# Patient Record
Sex: FEMALE | Race: WHITE | NOT HISPANIC OR LATINO | Employment: FULL TIME | URBAN - METROPOLITAN AREA
[De-identification: names, ages, dates, MRNs, and addresses within clinical notes are randomized per-mention and may not be internally consistent; named-entity substitution may affect disease eponyms.]

---

## 2017-03-28 RX ORDER — LEVOTHYROXINE SODIUM 112 MCG
TABLET ORAL
Qty: 90 TAB | Refills: 0 | Status: SHIPPED | OUTPATIENT
Start: 2017-03-28 | End: 2017-06-01 | Stop reason: SDUPTHER

## 2017-04-20 ENCOUNTER — OFFICE VISIT (OUTPATIENT)
Dept: CARDIOLOGY | Facility: MEDICAL CENTER | Age: 57
End: 2017-04-20
Payer: COMMERCIAL

## 2017-04-20 VITALS
HEIGHT: 68 IN | HEART RATE: 86 BPM | OXYGEN SATURATION: 95 % | BODY MASS INDEX: 40.92 KG/M2 | DIASTOLIC BLOOD PRESSURE: 88 MMHG | WEIGHT: 270 LBS | SYSTOLIC BLOOD PRESSURE: 148 MMHG

## 2017-04-20 DIAGNOSIS — I10 ESSENTIAL HYPERTENSION: ICD-10-CM

## 2017-04-20 DIAGNOSIS — E66.01 MORBID OBESITY DUE TO EXCESS CALORIES (HCC): ICD-10-CM

## 2017-04-20 PROCEDURE — 99204 OFFICE O/P NEW MOD 45 MIN: CPT | Performed by: INTERNAL MEDICINE

## 2017-04-20 PROCEDURE — 93000 ELECTROCARDIOGRAM COMPLETE: CPT | Performed by: INTERNAL MEDICINE

## 2017-04-20 RX ORDER — VALSARTAN 160 MG/1
TABLET ORAL
Refills: 0 | COMMUNITY
Start: 2017-04-14 | End: 2017-06-27 | Stop reason: SDUPTHER

## 2017-04-20 RX ORDER — MONTELUKAST SODIUM 4 MG/1
TABLET, CHEWABLE ORAL
Refills: 0 | COMMUNITY
Start: 2017-03-28 | End: 2018-09-26

## 2017-04-20 RX ORDER — AMLODIPINE BESYLATE 10 MG/1
10 TABLET ORAL DAILY
COMMUNITY
End: 2017-06-01 | Stop reason: SDUPTHER

## 2017-04-20 RX ORDER — OMEGA-3-ACID ETHYL ESTERS 1 G/1
CAPSULE, LIQUID FILLED ORAL
COMMUNITY
End: 2017-05-12

## 2017-04-20 ASSESSMENT — ENCOUNTER SYMPTOMS
NERVOUS/ANXIOUS: 0
ORTHOPNEA: 0
COUGH: 0
INSOMNIA: 1
PALPITATIONS: 0
DIZZINESS: 0
BRUISES/BLEEDS EASILY: 0
MUSCULOSKELETAL NEGATIVE: 1
EYES NEGATIVE: 1
DEPRESSION: 0
FALLS: 0
WHEEZING: 0
LOSS OF CONSCIOUSNESS: 0
SHORTNESS OF BREATH: 1
WEIGHT LOSS: 0
MEMORY LOSS: 0
NAUSEA: 0
HEARTBURN: 0

## 2017-04-20 NOTE — MR AVS SNAPSHOT
"        Nazia Lyle   2017 9:45 AM   Office Visit   MRN: [id number]    Department:  [facility]   Dept Phone:  [phone]    Description:  Female : 1960   Provider:  Teena Roberto M.D.           Reason for Visit     New Patient           Allergies as of 2017     Allergen Noted Reactions    Latex 2017   Rash    rash    Nkda [No Known Drug Allergy] 2012         You were diagnosed with     Essential hypertension   [[id number]]         Vital Signs     Blood Pressure Pulse Height Weight Body Mass Index Oxygen Saturation    148/88 mmHg 86 1.727 m (5' 7.99\") 122.471 kg (270 lb) 41.06 kg/m2 95%    Smoking Status                   Never Smoker            Basic Information     Date Of Birth Sex Race Ethnicity Preferred Language    1960 Female White Non- English      Your appointments     May 12, 2017  8:40 AM   Established Patient with Benita Nolasco M.D.   [facility]    [address], [address]  [place] [address] [phone]           You will be receiving a confirmation call a few days before your appointment from our automated call confirmation system.            May 17, 2017  7:15 AM   ECHO with ECHO [facility] EXAM 10   ECHOCARDIOLOGY [facility] ([facility])    [address] [address] [phone]           No prep              Problem List              ICD-10-CM Priority Class Noted - Resolved    HTN (hypertension) I10   2012 - Present    Dyslipidemia E78.5   2012 - Present    Obesity E66.9   2012 - Present    Degenerative arthritis of finger M19.049   2014 - Present    Hypothyroidism E03.9   2014 - Present    Elevated fasting blood sugar R73.01   2014 - Present    Vitamin D deficiency E55.9   2014 - Present    Hot flash, menopausal N95.1   2014 - Present    Morbid obesity (CMS-HCC) E66.01   2014 - Present    Vitamin D insufficiency E55.9   2014 - Present      "   Health Maintenance        Date Due Completion Dates    IMM DTaP/Tdap/Td Vaccine (1 - Tdap) 2/1/1979 ---    PAP SMEAR 8/28/2015 8/28/2012 (Prv Comp)    Override on 8/28/2012: Previously completed    MAMMOGRAM 10/9/2016 10/9/2015, 9/25/2014, 9/13/2013, 4/11/2011, 3/3/2010, 3/3/2010, 5/14/2009, 5/14/2009, 4/17/2008, 4/17/2008, 3/21/2007, 7/19/2005, 2/6/2004    COLONOSCOPY 6/4/2022 6/4/2012            Results       Current Immunizations     No immunizations on file.      Below and/or attached are the medications your provider expects you to take. Review all of your home medications and newly ordered medications with your provider and/or pharmacist. Follow medication instructions as directed by your provider and/or pharmacist. Please keep your medication list with you and share with your provider. Update the information when medications are discontinued, doses are changed, or new medications (including over-the-counter products) are added; and carry medication information at all times in the event of emergency situations     Allergies:  LATEX - Rash     NKDA - (reactions not documented)               Medications  Valid as of: April 20, 2017 - 10:39 AM    Generic Name Brand Name Tablet Size Instructions for use    AmLODIPine Besylate (Tab) NORVASC 10 MG Take 10 mg by mouth every day.        Colestipol HCl (Tab) COLESTID 1 GM         Furosemide (Tab) LASIX 20 MG Take 20 mg by mouth every day.        Hydrocodone-Acetaminophen (Tab) VICODIN 5-500 MG Take 1-2 Tabs by mouth every four hours as needed.        Ibuprofen (Tab) MOTRIN 200 MG Take 600 mg by mouth every 6 hours as needed.        Levothyroxine Sodium (Tab) SYNTHROID 112 MCG TAKE 1 TABLET ORALLY EVERY MORNING ON AN EMPTYSTOMACH        Liothyronine Sodium (Tab) CYTOMEL 5 MCG Take 1 Tab by mouth every day.        Omega-3-acid Ethyl Esters (Cap) LOVAZA 1 GM Take 2 Caps by mouth 2 Times a Day.        Omega-3-acid Ethyl Esters (Cap) LOVAZA 1 GM Take  by mouth.         Pravastatin Sodium (Tab) PRAVACHOL 20 MG TAKE 1 TABLET BY MOUTH ATBEDTIME        Probiotic Product   Take  by mouth.        Valsartan (Tab) DIOVAN 160 MG         Valsartan-Hydrochlorothiazide (Tab) DIOVAN--12.5 MG Take 1 Tab by mouth every day.        .                 Medicines prescribed today were sent to:     [facility] [facility], [place] - [address]    [address] [place] [address]    Phone: [phone] Fax: [phone]    Open 24 Hours?: No    [id number] - [place], [place] - [address]    [address] [place] [address]    Phone: [phone] Fax: [phone]    Open 24 Hours?: No    [facility] [place] [id number] - [place], [place] - [address]    [address] [facility] (not [facility]ty) [facility] [address]    Phone: [phone] Fax: [phone]    Open 24 Hours?: No      Medication refill instructions:       If your prescription bottle indicates you have medication refills left, it is not necessary to call your provider’s office. Please contact your pharmacy and they will refill your medication.    If your prescription bottle indicates you do not have any refills left, you may request refills at any time through one of the following ways: The online GoSave system (except Urgent Care), by calling your provider’s office, or by asking your pharmacy to contact your provider’s office with a refill request. Medication refills are processed only during regular business hours and may not be available until the next business day. Your provider may request additional information or to have a follow-up visit with you prior to refilling your medication.   *Please Note: Medication refills are assigned a new Rx number when refilled electronically. Your pharmacy may indicate that no refills were authorized even though a new prescription for the same medication is available at the pharmacy. Please request the medicine by name with the pharmacy before contacting  your provider for a refill.        Your To Do List     Future Labs/Procedures Complete By Expires    Echocardiogram Comp w/o Cont  As directed 4/20/2018         MyChart Access Code: Activation code not generated  Current Strike[employer] Status: Active

## 2017-04-20 NOTE — PROGRESS NOTES
Subjective:   Nazia Lyle is a 57 y.o. female who presents today as a new patient. She was sent in consultation by Dr. Zamarripa in regards to her hypertension.    She is in with 2 of her friends. They live in [place].    She works full time. She considers herself sedentary. She is so busy at work she cannot find time to exercise. She said it's not an issue of eating too much.  She has been diagnosed with hypertension for many years. In fact when she was 26 years old she weighed 120 pounds. She was told she had hypertension at that point.     Everyone in her family dies young, she tells me  She is concerned about her medications. She thinks she is overmedicated. She was on a beta blocker. She thinks this caused weight gain. She is not a calcium channel blocker and wonders if it causes her profound fatigue. She is tired when she wakes up. She drags her most of the day. She loves cooking and is a wonderful cook    She takes her medications as directed. She is very good at recording her blood pressures on her phone. Most of her readings systolics are in the 140s and her diastolics in the 90s to 100s.  She is under a lot of stress but denies abuse or emotional issues    No chest comfort but she does admit to snoring and often has shortness of breath in addition to her concern with her edema    Past Medical History   Diagnosis Date   • HTN (hypertension) 9/17/2012   • Dyslipidemia 9/17/2012   • Obesity 9/17/2012   • History of kidney stones      Past Surgical History   Procedure Laterality Date   • Tonsillectomy     • Primary c section     • Appendectomy     • Cholecystectomy     • Tonsillectomy     • Hardware removal upper extremity Left 8/11/2015     Procedure: HARDWARE REMOVAL UPPER EXTREMITY;  Surgeon: Chaka Mckeon M.D.;  Location: SURGERY [facility];  Service:      Family History   Problem Relation Age of Onset   • Thyroid Mother    • Diabetes Father    • Hypertension Father    • Diabetes  Maternal Grandmother    • Diabetes Maternal Grandfather    • Heart Disease Paternal Grandmother    • Cancer Paternal Grandfather      History   Smoking status   • Never Smoker    Smokeless tobacco   • Not on file     Allergies   Allergen Reactions   • Latex Rash     rash   • Nkda [No Known Drug Allergy]      Outpatient Encounter Prescriptions as of 4/20/2017   Medication Sig Dispense Refill   • valsartan (DIOVAN) 160 MG Tab   0   • omega-3 acid ethyl esters (LOVAZA) 1 GM capsule Take  by mouth.     • amlodipine (NORVASC) 10 MG Tab Take 10 mg by mouth every day.     • SYNTHROID 112 MCG Tab TAKE 1 TABLET ORALLY EVERY MORNING ON AN EMPTYSTOMACH 90 Tab 0   • pravastatin (PRAVACHOL) 20 MG Tab TAKE 1 TABLET BY MOUTH ATBEDTIME 90 Tab 3   • omega-3 acid ethyl esters (LOVAZA) 1 GM capsule Take 2 Caps by mouth 2 Times a Day. 360 Cap 3   • furosemide (LASIX) 20 MG TABS Take 20 mg by mouth every day.     • ibuprofen (ADVIL) 200 MG TABS Take 600 mg by mouth every 6 hours as needed.     • Probiotic Product (SOLUBLE FIBER/PROBIOTICS PO) Take  by mouth.     • valsartan-hydrochlorothiazide (DIOVAN HCT) 160-12.5 MG per tablet Take 1 Tab by mouth every day.     • colestipol (COLESTID) 1 GM Tab   0   • liothyronine (CYTOMEL) 5 MCG Tab Take 1 Tab by mouth every day. 90 Tab 0   • hydrocodone-acetaminophen (LORTAB 5) 5-500 MG Tab Take 1-2 Tabs by mouth every four hours as needed. 20 Tab 0     No facility-administered encounter medications on file as of 4/20/2017.     Review of Systems   Constitutional: Positive for malaise/fatigue. Negative for weight loss.   Eyes: Negative.    Respiratory: Positive for shortness of breath. Negative for cough and wheezing.    Cardiovascular: Positive for leg swelling. Negative for chest pain, palpitations and orthopnea.   Gastrointestinal: Negative for heartburn and nausea.   Musculoskeletal: Negative.  Negative for falls.   Neurological: Negative for dizziness and loss of consciousness.  "  Endo/Heme/Allergies: Does not bruise/bleed easily.   Psychiatric/Behavioral: Negative for depression, suicidal ideas and memory loss. The patient has insomnia. The patient is not nervous/anxious.    All other systems reviewed and are negative.       Objective:   /88 mmHg  Pulse 86  Ht 1.727 m (5' 7.99\")  Wt 122.471 kg (270 lb)  BMI 41.06 kg/m2  SpO2 95%    Physical Exam   Constitutional: She is oriented to person, place, and time.   Morbidly obese, talkative, in no acute distress   HENT:   Head: Normocephalic and atraumatic.   Mouth/Throat: No oropharyngeal exudate.   Eyes: EOM are normal. Pupils are equal, round, and reactive to light.   Neck: No JVD present.   Cardiovascular: Normal rate, regular rhythm and intact distal pulses.  Exam reveals no friction rub.    No murmur heard.  Pulmonary/Chest: Breath sounds normal. She has no wheezes. She has no rales.   Musculoskeletal: She exhibits edema (trace pitting edema bilaterally, notable varicosities/spider veins at the ankles). She exhibits no tenderness.   Lymphadenopathy:     She has no cervical adenopathy.   Neurological: She is alert and oriented to person, place, and time. She exhibits normal muscle tone.   Skin: Skin is warm and dry. No rash noted.   Psychiatric: She has a normal mood and affect. Her behavior is normal.       Assessment:     1. Essential hypertension  EKG    Echocardiogram Comp w/o Cont   2. Morbid obesity due to excess calories (CMS-[facility])         Medical Decision Making:  Today's Assessment / Status / Plan:     Medical records reviewed. She has been seen by Dr. Willoughby up in [place]. She does not think he helped her much  She was also seen by Dr. [place] in [place] [facility] as recently as 4 years ago. Echocardiogram as well as EKG at that time were normal    EKG done today and read by me shows normal sinus rhythm, nonspecific ST changes.  She also had lab work done at her endocrinologist including a thyroid panel CMP and CBC. I " requested these, she tells me they were normal as recently as 3 months ago    Hypertension, spoke for more than 35 minutes about physiology. I think most importantly is my concern for her morbid obesity, sedentary lifestyle and concern for sleep apnea. We discussed this at length and she voices understanding. She is on low-dose Diovan as well as amlodipine and HCTZ. I do think this is a good regimen and that she is not overmedicated. We talked about essential hypertension or disease states causing hypertension.  Morbid obesity/diet and exercise. I discussed referring her for a sleep study. She'll think about this. We talked about weight loss and the American heart healthy guidelines for eating. We talked about a reasonable exercise regimen.    Plan    She is very engage in her healthcare.  Repeat echocardiogram looking for diastolic dysfunction and valvular heart disease and pulmonary hypertension  Follow blood pressures at home, compliance of medications    If her blood pressure at one month or still more than 140/90 consistently, and or she does not think she tolerates her amlodipine, I would recommend hydralazine 50 mg twice a day or 3 times a day in addition to reducing her amlodipine to 5 mg. I talked about possible side effects. I do not think the beta blocker caused her to gain weight and we discussed this as well    RTC as needed based on echo and phone call in One month

## 2017-04-20 NOTE — Clinical Note
[facility]   [address]  [place] [address]  Phone: [phone]  Fax: [phone]              Nazia Lyle  1960    Encounter Date: 4/20/2017    Teena Roberto M.D.          PROGRESS NOTE:  Subjective:   Nazia Lyle is a 57 y.o. female who presents today as a new patient. She was sent in consultation by Dr. Zamarripa in regards to her hypertension.    She is in with 2 of her friends. They live in [place].    She works full time. She considers herself sedentary. She is so busy at work she cannot find time to exercise. She said it's not an issue of eating too much.  She has been diagnosed with hypertension for many years. In fact when she was 26 years old she weighed 120 pounds. She was told she had hypertension at that point.     Everyone in her family dies young, she tells me  She is concerned about her medications. She thinks she is overmedicated. She was on a beta blocker. She thinks this caused weight gain. She is not a calcium channel blocker and wonders if it causes her profound fatigue. She is tired when she wakes up. She drags her most of the day. She loves cooking and is a wonderful cook    She takes her medications as directed. She is very good at recording her blood pressures on her phone. Most of her readings systolics are in the 140s and her diastolics in the 90s to 100s.  She is under a lot of stress but denies abuse or emotional issues    No chest comfort but she does admit to snoring and often has shortness of breath in addition to her concern with her edema    Past Medical History   Diagnosis Date   • HTN (hypertension) 9/17/2012   • Dyslipidemia 9/17/2012   • Obesity 9/17/2012   • History of kidney stones      Past Surgical History   Procedure Laterality Date   • Tonsillectomy     • Primary c section     • Appendectomy     • Cholecystectomy     • Tonsillectomy     • Hardware removal upper extremity  Left 8/11/2015     Procedure: HARDWARE REMOVAL UPPER EXTREMITY;  Surgeon: Chaka Mckeon M.D.;  Location: [facility]ERY [facility];  Service:      Family History   Problem Relation Age of Onset   • Thyroid Mother    • Diabetes Father    • Hypertension Father    • Diabetes Maternal Grandmother    • Diabetes Maternal Grandfather    • Heart Disease Paternal Grandmother    • Cancer Paternal Grandfather      History   Smoking status   • Never Smoker    Smokeless tobacco   • Not on file     Allergies   Allergen Reactions   • Latex Rash     rash   • Nkda [No Known Drug Allergy]      Outpatient Encounter Prescriptions as of 4/20/2017   Medication Sig Dispense Refill   • valsartan (DIOVAN) 160 MG Tab   0   • omega-3 acid ethyl esters (LOVAZA) 1 GM capsule Take  by mouth.     • amlodipine (NORVASC) 10 MG Tab Take 10 mg by mouth every day.     • SYNTHROID 112 MCG Tab TAKE 1 TABLET ORALLY EVERY MORNING ON AN EMPTYSTOMACH 90 Tab 0   • pravastatin (PRAVACHOL) 20 MG Tab TAKE 1 TABLET BY MOUTH ATBEDTIME 90 Tab 3   • omega-3 acid ethyl esters (LOVAZA) 1 GM capsule Take 2 Caps by mouth 2 Times a Day. 360 Cap 3   • furosemide (LASIX) 20 MG TABS Take 20 mg by mouth every day.     • ibuprofen (ADVIL) 200 MG TABS Take 600 mg by mouth every 6 hours as needed.     • Probiotic Product (SOLUBLE FIBER/PROBIOTICS PO) Take  by mouth.     • valsartan-hydrochlorothiazide (DIOVAN HCT) 160-12.5 MG per tablet Take 1 Tab by mouth every day.     • colestipol (COLESTID) 1 GM Tab   0   • liothyronine (CYTOMEL) 5 MCG Tab Take 1 Tab by mouth every day. 90 Tab 0   • hydrocodone-acetaminophen (LORTAB 5) 5-500 MG Tab Take 1-2 Tabs by mouth every four hours as needed. 20 Tab 0     No facility-administered encounter medications on file as of 4/20/2017.     Review of Systems   Constitutional: Positive for malaise/fatigue. Negative for weight loss.   Eyes: Negative.    Respiratory: Positive for shortness of breath. Negative for cough and wheezing.     "  Cardiovascular: Positive for leg swelling. Negative for chest pain, palpitations and orthopnea.   Gastrointestinal: Negative for heartburn and nausea.   Musculoskeletal: Negative.  Negative for falls.   Neurological: Negative for dizziness and loss of consciousness.   Endo/Heme/Allergies: Does not bruise/bleed easily.   Psychiatric/Behavioral: Negative for depression, suicidal ideas and memory loss. The patient has insomnia. The patient is not nervous/anxious.    All other systems reviewed and are negative.       Objective:   /88 mmHg  Pulse 86  Ht 1.727 m (5' 7.99\")  Wt 122.471 kg (270 lb)  BMI 41.06 kg/m2  SpO2 95%    Physical Exam   Constitutional: She is oriented to person, place, and time.   Morbidly obese, talkative, in no acute distress   HENT:   Head: Normocephalic and atraumatic.   Mouth/Throat: No oropharyngeal exudate.   Eyes: EOM are normal. Pupils are equal, round, and reactive to light.   Neck: No JVD present.   Cardiovascular: Normal rate, regular rhythm and intact distal pulses.  Exam reveals no friction rub.    No murmur heard.  Pulmonary/Chest: Breath sounds normal. She has no wheezes. She has no rales.   Musculoskeletal: She exhibits edema (trace pitting edema bilaterally, notable varicosities/spider veins at the ankles). She exhibits no tenderness.   Lymphadenopathy:     She has no cervical adenopathy.   Neurological: She is alert and oriented to person, place, and time. She exhibits normal muscle tone.   Skin: Skin is warm and dry. No rash noted.   Psychiatric: She has a normal mood and affect. Her behavior is normal.       Assessment:     1. Essential hypertension  EKG    Echocardiogram Comp w/o Cont   2. Morbid obesity due to excess calories (CMS-[facility])         Medical Decision Making:  Today's Assessment / Status / Plan:     Medical records reviewed. She has been seen by Dr. Willoughby up in [place]. She does not think he helped her much  She was also seen by Dr. thomas in [place] as recently as 4 years ago. Echocardiogram as well as EKG at that time were normal    EKG done today and read by me shows normal sinus rhythm, nonspecific ST changes.  She also had lab work done at her endocrinologist including a thyroid panel CMP and CBC. I requested these, she tells me they were normal as recently as 3 months ago    Hypertension, spoke for more than 35 minutes about physiology. I think most importantly is my concern for her morbid obesity, sedentary lifestyle and concern for sleep apnea. We discussed this at length and she voices understanding. She is on low-dose Diovan as well as amlodipine and HCTZ. I do think this is a good regimen and that she is not overmedicated. We talked about essential hypertension or disease states causing hypertension.  Morbid obesity/diet and exercise. I discussed referring her for a sleep study. She'll think about this. We talked about weight loss and the American heart healthy guidelines for eating. We talked about a reasonable exercise regimen.    Plan    She is very engage in her healthcare.  Repeat echocardiogram looking for diastolic dysfunction and valvular heart disease and pulmonary hypertension  Follow blood pressures at home, compliance of medications    If her blood pressure at one month or still more than 140/90 consistently, and or she does not think she tolerates her amlodipine, I would recommend hydralazine 50 mg twice a day or 3 times a day in addition to reducing her amlodipine to 5 mg. I talked about possible side effects. I do not think the beta blocker caused her to gain weight and we discussed this as well    RTC as needed based on echo and phone call in One month        Prasanna Zamarripa M.D.  [address] [place] [id number]  VIA Facsimile: [phone]

## 2017-04-21 LAB — EKG IMPRESSION: NORMAL

## 2017-04-27 RX ORDER — LIOTHYRONINE SODIUM 5 UG/1
TABLET ORAL
Qty: 90 TAB | Refills: 1 | Status: SHIPPED | OUTPATIENT
Start: 2017-04-27 | End: 2017-05-12

## 2017-05-08 ENCOUNTER — TELEPHONE (OUTPATIENT)
Dept: ENDOCRINOLOGY | Facility: MEDICAL CENTER | Age: 57
End: 2017-05-08

## 2017-05-08 DIAGNOSIS — E03.9 ACQUIRED HYPOTHYROIDISM: ICD-10-CM

## 2017-05-08 DIAGNOSIS — E78.2 MIXED HYPERLIPIDEMIA: ICD-10-CM

## 2017-05-08 NOTE — TELEPHONE ENCOUNTER
Pt requesting a new lab order prior to her appt this coming Friday.    Pt fax number is [phone].    Thank you  Emi

## 2017-05-12 ENCOUNTER — OFFICE VISIT (OUTPATIENT)
Dept: ENDOCRINOLOGY | Facility: MEDICAL CENTER | Age: 57
End: 2017-05-12
Payer: COMMERCIAL

## 2017-05-12 VITALS
HEART RATE: 78 BPM | OXYGEN SATURATION: 94 % | BODY MASS INDEX: 40.92 KG/M2 | HEIGHT: 68 IN | WEIGHT: 270 LBS | DIASTOLIC BLOOD PRESSURE: 86 MMHG | SYSTOLIC BLOOD PRESSURE: 128 MMHG

## 2017-05-12 DIAGNOSIS — E55.9 VITAMIN D DEFICIENCY: ICD-10-CM

## 2017-05-12 DIAGNOSIS — E03.9 ACQUIRED HYPOTHYROIDISM: ICD-10-CM

## 2017-05-12 DIAGNOSIS — E66.01 SEVERE OBESITY (BMI >= 40) (HCC): ICD-10-CM

## 2017-05-12 DIAGNOSIS — E78.2 MIXED HYPERLIPIDEMIA: ICD-10-CM

## 2017-05-12 PROCEDURE — 99214 OFFICE O/P EST MOD 30 MIN: CPT | Performed by: INTERNAL MEDICINE

## 2017-05-12 RX ORDER — OMEGA-3-ACID ETHYL ESTERS 1 G/1
2 CAPSULE, LIQUID FILLED ORAL 2 TIMES DAILY
Qty: 360 CAP | Refills: 3 | Status: SHIPPED | OUTPATIENT
Start: 2017-05-12 | End: 2019-02-14 | Stop reason: SDUPTHER

## 2017-05-12 NOTE — PROGRESS NOTES
Endocrinology Clinic Progress Note  PCP: Prasanna Zamarripa M.D.    CC: Hypothyroidism    HPI:  Nazia Lyle is a 57 y.o. old patient who comes in today for routine follow up.     Hypothyroidism: She is currently on Synthroid 112 µg daily. She stopped taking Cytomel about 1-1/2 months ago. She was previously on Cytomel 5 µg daily. After discontinuing Cytomel she feels about the same. Denies any worsening of fatigue or weight gain. Overall she feels well.    Obesity: She has not been able to lose any weight since last clinic appointment, weight is stable.    Vitamin D deficiency: She is currently on vitamin D 2000 international units daily.    Hyperlipidemia: She is currently on lovaza and pravastatin, tolerating well.    ROS:  Constitutional: No unintentional weight loss  Endo: Denies excessive thirst or frequent urination    Past Medical History:  Patient Active Problem List    Diagnosis Date Noted   • Vitamin D insufficiency 08/31/2014   • Hypothyroidism 08/08/2014   • Elevated fasting blood sugar 08/08/2014   • Vitamin D deficiency 08/08/2014   • Hot flash, menopausal 08/08/2014   • Morbid obesity (CMS-HCC) 08/08/2014   • 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   • HTN (hypertension) 09/17/2012   • Dyslipidemia 09/17/2012   • Obesity 09/17/2012       Medications:    Current outpatient prescriptions:   •  omega-3 acid ethyl esters (LOVAZA) 1 GM capsule, Take 2 Caps by mouth 2 Times a Day., Disp: 360 Cap, Rfl: 3  •  colestipol (COLESTID) 1 GM Tab, , Disp: , Rfl: 0  •  valsartan (DIOVAN) 160 MG Tab, , Disp: , Rfl: 0  •  amlodipine (NORVASC) 10 MG Tab, Take 10 mg by mouth every day., Disp: , Rfl:   •  SYNTHROID 112 MCG Tab, TAKE 1 TABLET ORALLY EVERY MORNING ON AN EMPTYSTOMACH, Disp: 90 Tab, Rfl: 0  •  pravastatin (PRAVACHOL) 20 MG Tab, TAKE 1 TABLET BY MOUTH ATBEDTIME, Disp: 90 Tab, Rfl: 3  •  furosemide (LASIX) 20 MG TABS, Take 20 mg by mouth every day., Disp: , Rfl:   •  ibuprofen (ADVIL) 200 MG TABS, Take  "600 mg by mouth every 6 hours as needed., Disp: , Rfl:   •  Probiotic Product (SOLUBLE FIBER/PROBIOTICS PO), Take  by mouth., Disp: , Rfl:     Labs:   Labs on May 9, 2017: Glucose 104, creatinine 0.6, total cholesterol 196, HDL 32, , triglycerides 214, TSH 3.77, free T4 1 0.15, tissue transglutaminase antibody less than 1.2, IgA 260    Physical Examination:  Vital signs: /86 mmHg  Pulse 78  Ht 1.727 m (5' 8\")  Wt 122.471 kg (270 lb)  BMI 41.06 kg/m2  SpO2 94%  General: No apparent distress, cooperative  Eyes: No scleral icterus, no discharge, normal eyelids  Neck: No abnormal masses on inspection   Resp: Normal effort  Psych: Alert and oriented, normal mood and affect    Assessment and Plan:    1. Acquired hypothyroidism  · Recent TSH is 3.7  · Continue Synthroid 112 µg daily  · Repeat labs in 6 months  - TSH; Future  - FREE THYROXINE; Future    2. Mixed hyperlipidemia  · Recent triglycerides 214,   · Continue lovaza and pravastatin  · Repeat labs in 6 months  - LIPID PROFILE; Future  - omega-3 acid ethyl esters (LOVAZA) 1 GM capsule; Take 2 Caps by mouth 2 Times a Day.  Dispense: 360 Cap; Refill: 3    3. Vitamin D deficiency  · Continue vitamin D 2000 international units daily  · Repeat labs in 6 months  - VITAMIN D,25 HYDROXY; Future    4. Severe obesity (BMI >= 40) (CMS-HCC)  · We discussed about diet and lifestyle modifications for weight loss  · Recent labs show fasting blood sugar 104, we discussed about risk of prediabetes/diabetes  · Repeat fasting labs in 6 months  - BASIC METABOLIC PANEL; Future    Return in about 6 months (around 11/12/2017).    Thank you for allowing me to participate in the care of this patient.    Benita Nolasco M.D.    CC:   Prasanna Zamarripa M.D.    This note was created using voice recognition software (Dragon). The accuracy of the dictation is limited by the abilities of the software. I have reviewed the note prior to signing, however some errors in grammar " and context are still possible. If you have any questions related to this note please do not hesitate to contact our office.

## 2017-05-12 NOTE — MR AVS SNAPSHOT
"        Nazia Lyle   2017 8:40 AM   Office Visit   MRN: [id number]    Department:  Endocrinology [facility]   Dept Phone:  [phone]    Description:  Female : 1960   Provider:  Benita Nolasco M.D.           Reason for Visit     Follow-Up hypothyroidism      Allergies as of 2017     Allergen Noted Reactions    Latex 2017   Rash    rash    Nkda [No Known Drug Allergy] 2012         You were diagnosed with     Acquired hypothyroidism   [5012282]       Mixed hyperlipidemia   [272.2.ICD-9-CM]       Vitamin D deficiency   [7000254]       Severe obesity (BMI >= 40) (CMS-HCC)   [661141]       Dyslipidemia   [471602]         Vital Signs     Blood Pressure Pulse Height Weight Body Mass Index Oxygen Saturation    128/86 mmHg 78 1.727 m (5' 8\") 122.471 kg (270 lb) 41.06 kg/m2 94%    Smoking Status                   Never Smoker            Basic Information     Date Of Birth Sex Race Ethnicity Preferred Language    1960 Female White Non- English      Your appointments     May 17, 2017  7:15 AM   ECHO with ECHO [facility], [facility] EXAM 10   ECHOCARDIOLOGY [facility] ([facility])    [address] [address]   [phone]           No prep            2017  9:40 AM   Established Patient with Benita Nolasco M.D.   [facility]    [address]  [facility] [address] [phone]           You will be receiving a confirmation call a few days before your appointment from our automated call confirmation system.              Problem List              ICD-10-CM Priority Class Noted - Resolved    HTN (hypertension) I10   2012 - Present    Dyslipidemia E78.5   2012 - Present    Obesity E66.9   2012 - Present    Degenerative arthritis of finger M19.049   2014 - Present    Hypothyroidism E03.9   2014 - Present    Elevated fasting blood sugar R73.01   2014 - Present    Vitamin D deficiency E55.9   2014 - " Present    Hot flash, menopausal N95.1   8/8/2014 - Present    Morbid obesity (CMS-[facility]) E66.01   8/8/2014 - Present    Vitamin D insufficiency E55.9   8/31/2014 - Present      Health Maintenance        Date Due Completion Dates    IMM DTaP/Tdap/Td Vaccine (1 - Tdap) 2/1/1979 ---    PAP SMEAR 8/28/2015 8/28/2012 (Prv Comp)    Override on 8/28/2012: Previously completed    MAMMOGRAM 10/9/2016 10/9/2015, 9/25/2014, 9/13/2013, 4/11/2011, 3/3/2010, 3/3/2010, 5/14/2009, 5/14/2009, 4/17/2008, 4/17/2008, 3/21/2007, 7/19/2005, 2/6/2004    COLONOSCOPY 6/4/2022 6/4/2012            Current Immunizations     No immunizations on file.      Below and/or attached are the medications your provider expects you to take. Review all of your home medications and newly ordered medications with your provider and/or pharmacist. Follow medication instructions as directed by your provider and/or pharmacist. Please keep your medication list with you and share with your provider. Update the information when medications are discontinued, doses are changed, or new medications (including over-the-counter products) are added; and carry medication information at all times in the event of emergency situations     Allergies:  LATEX - Rash     NKDA - (reactions not documented)               Medications  Valid as of: May 12, 2017 -  9:12 AM    Generic Name Brand Name Tablet Size Instructions for use    AmLODIPine Besylate (Tab) NORVASC 10 MG Take 10 mg by mouth every day.        Colestipol HCl (Tab) COLESTID 1 GM         Furosemide (Tab) LASIX 20 MG Take 20 mg by mouth every day.        Ibuprofen (Tab) MOTRIN 200 MG Take 600 mg by mouth every 6 hours as needed.        Levothyroxine Sodium (Tab) SYNTHROID 112 MCG TAKE 1 TABLET ORALLY EVERY MORNING ON AN EMPTYSTOMACH        Omega-3-acid Ethyl Esters (Cap) LOVAZA 1 GM Take 2 Caps by mouth 2 Times a Day.        Pravastatin Sodium (Tab) PRAVACHOL 20 MG TAKE 1 TABLET BY MOUTH ATBEDTIME        Probiotic  Product   Take  by mouth.        Valsartan (Tab) DIOVAN 160 MG         .                 Medicines prescribed today were sent to:     [facility] [place] [address] - [address]    [address] [facility] [address]    Phone: [phone] Fax: [phone]    Open 24 Hours?: No    [facility] - [place], [place] - [address]    [address] [facility] [place] [address]    Phone: [phone] Fax: [phone]    Open 24 Hours?: No    [facility] [place] [facility] - [place] [address]    [address] [facility] [place] [address]    Phone: [phone] Fax: [phone]    Open 24 Hours?: No      Medication refill instructions:       If your prescription bottle indicates you have medication refills left, it is not necessary to call your provider’s office. Please contact your pharmacy and they will refill your medication.    If your prescription bottle indicates you do not have any refills left, you may request refills at any time through one of the following ways: The online [employer] system (except Urgent Care), by calling your provider’s office, or by asking your pharmacy to contact your provider’s office with a refill request. Medication refills are processed only during regular business hours and may not be available until the next business day. Your provider may request additional information or to have a follow-up visit with you prior to refilling your medication.   *Please Note: Medication refills are assigned a new Rx number when refilled electronically. Your pharmacy may indicate that no refills were authorized even though a new prescription for the same medication is available at the pharmacy. Please request the medicine by name with the pharmacy before contacting your provider for a refill.        Your To Do List     Future Labs/Procedures Complete By Expires    BASIC METABOLIC PANEL  As directed 5/12/2019    FREE THYROXINE  As directed 5/12/2018     LIPID PROFILE  As directed 5/12/2019    TSH  As directed 5/12/2018    VITAMIN D,25 HYDROXY  As directed 5/12/2018         MyChart Access Code: Activation code not generated  Current MyChart Status: Active

## 2017-05-17 ENCOUNTER — HOSPITAL ENCOUNTER (OUTPATIENT)
Dept: CARDIOLOGY | Facility: MEDICAL CENTER | Age: 57
End: 2017-05-17
Attending: INTERNAL MEDICINE
Payer: COMMERCIAL

## 2017-05-17 ENCOUNTER — TELEPHONE (OUTPATIENT)
Dept: CARDIOLOGY | Facility: MEDICAL CENTER | Age: 57
End: 2017-05-17

## 2017-05-17 DIAGNOSIS — I10 ESSENTIAL HYPERTENSION: ICD-10-CM

## 2017-05-17 LAB
LV EJECT FRACT  99904: 60
LV EJECT FRACT MOD 2C 99903: 67.79
LV EJECT FRACT MOD 4C 99902: 65.42
LV EJECT FRACT MOD BP 99901: 66.06

## 2017-05-17 PROCEDURE — 93306 TTE W/DOPPLER COMPLETE: CPT | Mod: 26 | Performed by: INTERNAL MEDICINE

## 2017-05-17 PROCEDURE — 93306 TTE W/DOPPLER COMPLETE: CPT

## 2017-05-17 NOTE — TELEPHONE ENCOUNTER
Echocardiogram Comp w/o Cont   Status: Final result     Visible to patient:  Derian     Dx:  Essential hypertension               Notes Recorded by Teena Roberto M.D. on 5/17/2017 at 12:20 PM  Great news  Normal & reassuring function on echo - no concerning findings  F/u as planned     Pt notified of echo results

## 2017-05-18 ENCOUNTER — TELEPHONE (OUTPATIENT)
Dept: CARDIOLOGY | Facility: MEDICAL CENTER | Age: 57
End: 2017-05-18

## 2017-05-18 DIAGNOSIS — I10 ESSENTIAL HYPERTENSION: ICD-10-CM

## 2017-05-18 NOTE — TELEPHONE ENCOUNTER
----- Message from Teena Roberto M.D. sent at 4/20/2017 12:00 PM PDT -----  Regarding: FT  Please see my note from 4/20

## 2017-05-18 NOTE — TELEPHONE ENCOUNTER
Called patient for an update on her blood pressure readings. She states that her SBP is running in the 140s-160s. Her blood pressure today was 168/86.    Patient does not want to start new medication (Hydralazine) yet, she wants to wait a couple of weeks to see if her blood pressure gets better on her current medication.    Patient will call the office in two weeks with an update.    HAYLEE MARTINO

## 2017-06-01 RX ORDER — HYDRALAZINE HYDROCHLORIDE 50 MG/1
50 TABLET, FILM COATED ORAL 2 TIMES DAILY
Qty: 60 TAB | Refills: 3 | OUTPATIENT
Start: 2017-06-01 | End: 2017-06-08

## 2017-06-01 RX ORDER — AMLODIPINE BESYLATE 5 MG/1
5 TABLET ORAL DAILY
Qty: 90 TAB | Refills: 3 | OUTPATIENT
Start: 2017-06-01 | End: 2017-06-08

## 2017-06-01 NOTE — TELEPHONE ENCOUNTER
Called patient. She states that her blood pressure has been all over the place, but her readings today are:    138/96, 128/95, 118/96    Patient states that she is willing to try Hydralazine 50 mg BID and reduce Amlodipine to 5 mg daily (per Dr. Roberto's office note 4/20/2017).    Hydralazine prescription and updated Amlodipine prescription sent to [facility].    HAYLEE RN

## 2017-06-01 NOTE — TELEPHONE ENCOUNTER
Left patient a voicemail with instructions to call the office with an update on her blood pressure readings.    HAYLEE MARTINO

## 2017-06-02 RX ORDER — LEVOTHYROXINE SODIUM 112 MCG
TABLET ORAL
Qty: 90 TAB | Refills: 1 | Status: SHIPPED | OUTPATIENT
Start: 2017-06-02 | End: 2018-01-19 | Stop reason: SDUPTHER

## 2017-06-07 ENCOUNTER — TELEPHONE (OUTPATIENT)
Dept: CARDIOLOGY | Facility: MEDICAL CENTER | Age: 57
End: 2017-06-07

## 2017-06-07 DIAGNOSIS — R40.0 DAYTIME SOMNOLENCE: ICD-10-CM

## 2017-06-07 DIAGNOSIS — E66.01 MORBID OBESITY, UNSPECIFIED OBESITY TYPE (HCC): ICD-10-CM

## 2017-06-07 DIAGNOSIS — R60.0 BILATERAL EDEMA OF LOWER EXTREMITY: ICD-10-CM

## 2017-06-07 DIAGNOSIS — R06.02 SHORTNESS OF BREATH: ICD-10-CM

## 2017-06-07 NOTE — TELEPHONE ENCOUNTER
Pt reports having issues with Hydralazine and Amlodipine. States that she is extremely lethargic, not even wanting to get out of bed, and becoming very irritable. She believes that the Norvasc is also causing her ankles to stay swollen. Reports BP is still about 138 systolic and 85-96 diastolic. Pt has been taking only Hydralazine once daily instead of BID due to fatigue.    To Dr. Roberto to please review and advise. Thank you.

## 2017-06-07 NOTE — TELEPHONE ENCOUNTER
"----- Message from Jen Miller sent at 6/7/2017 10:03 AM PDT -----  Regarding: Problem with medication   LA/Tanja    Pt states new medication(Hydralazine) prescribed it not working. States she can \" barley function\". She would please like a call back and can be reached at [phone].  "

## 2017-06-08 RX ORDER — FUROSEMIDE 40 MG/1
40 TABLET ORAL EVERY MORNING
Qty: 90 TAB | Refills: 3 | Status: SHIPPED | OUTPATIENT
Start: 2017-06-08 | End: 2018-03-02 | Stop reason: SDUPTHER

## 2017-06-08 NOTE — TELEPHONE ENCOUNTER
Called patient and advised her of Dr. Roberto's instructions. Patient verbalized understanding and agrees with plan.    Hydralazine and Amlodipine discontinued. Updated prescription for Lasix 40 mg every morning sent to [facility] per patient request.    Labs ordered, slips mailed to patient with instructions.    JNS RN    Message  Received: Yesterday       FAISAL Mccloud R.N.       Caller: Unspecified (Yesterday, 10:46 AM)                     May well be running out of options     Lasix increase to 40am   bnp & bmp in 2w     Ok to refer to Dr Bloch if still concerned this summer            Previous Messages       ----- Message -----      From: Tanja Grier R.N.      Sent: 6/7/2017  12:16 PM        To: Teena Roberto M.D.     OK.   Yes, she wants to stop taking both the Norvasc and Hydralazine, and hoping to be put on something else. Thank you

## 2017-06-08 NOTE — TELEPHONE ENCOUNTER
Called patient. She states that she does want to try alternative medication, she feels that Hydralazine is contributing to her fatigue and Amlodipine is contributing to her ankle swelling. She does also mention again that she knows that she needs to lose weight, she feels that her medication side effects make it harder for her to lose weight.    Patient agrees to have a sleep study as mentioned in Dr. Roberto's last office note. Referral initiated.    Forwarded to Dr. Roberto to review/advise on patient's hypertension medication.    JNS RN    Message  Received: Yesterday       FAISAL Mccloud R.N.       Caller: Unspecified (Yesterday, 10:46 AM)                     Please see my last note   Pt very obese, was not interested in meds at all when I just saw her   I dont have a lot of answers     Goal BP is what is important   Does she think she needs another med?

## 2017-06-27 DIAGNOSIS — I10 ESSENTIAL HYPERTENSION: ICD-10-CM

## 2017-06-27 RX ORDER — VALSARTAN 160 MG/1
160 TABLET ORAL DAILY
Qty: 30 TAB | Refills: 11 | Status: SHIPPED | OUTPATIENT
Start: 2017-06-27 | End: 2018-06-29 | Stop reason: SDUPTHER

## 2017-11-06 ENCOUNTER — TELEPHONE (OUTPATIENT)
Dept: ENDOCRINOLOGY | Facility: MEDICAL CENTER | Age: 57
End: 2017-11-06

## 2017-11-06 DIAGNOSIS — E78.2 MIXED HYPERLIPIDEMIA: ICD-10-CM

## 2017-11-06 DIAGNOSIS — E03.9 ACQUIRED HYPOTHYROIDISM: ICD-10-CM

## 2017-11-06 DIAGNOSIS — E55.9 VITAMIN D DEFICIENCY: ICD-10-CM

## 2017-11-06 NOTE — TELEPHONE ENCOUNTER
Pt called and she need a new lab order prior to her appt on Monday.  Lab will be fax to [facility]    Thank you  Emi

## 2017-11-13 ENCOUNTER — OFFICE VISIT (OUTPATIENT)
Dept: ENDOCRINOLOGY | Facility: MEDICAL CENTER | Age: 57
End: 2017-11-13
Payer: COMMERCIAL

## 2017-11-13 VITALS
SYSTOLIC BLOOD PRESSURE: 128 MMHG | BODY MASS INDEX: 39.89 KG/M2 | HEART RATE: 95 BPM | DIASTOLIC BLOOD PRESSURE: 82 MMHG | WEIGHT: 263.2 LBS | HEIGHT: 68 IN | OXYGEN SATURATION: 95 %

## 2017-11-13 DIAGNOSIS — E55.9 VITAMIN D DEFICIENCY: ICD-10-CM

## 2017-11-13 DIAGNOSIS — E78.2 MIXED HYPERLIPIDEMIA: ICD-10-CM

## 2017-11-13 DIAGNOSIS — E03.9 ACQUIRED HYPOTHYROIDISM: ICD-10-CM

## 2017-11-13 DIAGNOSIS — E66.01 SEVERE OBESITY (BMI >= 40) (HCC): ICD-10-CM

## 2017-11-13 PROCEDURE — 99214 OFFICE O/P EST MOD 30 MIN: CPT | Performed by: INTERNAL MEDICINE

## 2017-11-13 RX ORDER — LIOTHYRONINE SODIUM 5 UG/1
5 TABLET ORAL EVERY MORNING
Qty: 90 TAB | Refills: 3 | Status: SHIPPED | OUTPATIENT
Start: 2017-11-13 | End: 2018-05-14 | Stop reason: SDUPTHER

## 2017-11-13 NOTE — PROGRESS NOTES
"Endocrinology Clinic Progress Note  PCP: Prasanna Zamarripa M.D.    CC: Hypothyroidism    HPI:  Nazia Lyle is a 57 y.o. old patient who comes in today for routine follow up.     Hypothyroidism: She is currently on Synthroid 112 µg daily. She reports compliance with medications. She has been off Cytomel for 6 months, she thinks that overall she felt better on combination of Cytomel and levothyroxine, so she would like to be back on Cytomel 5 µg daily.    Obesity: She has lost about 7 pounds since last clinic visit. She is watching her diet closely.     Vitamin D deficiency: She is currently on vitamin D 2000 international units daily. Recent vitamin D level looks good.    Hyperlipidemia: She is currently on lovaza and pravastatin, tolerating well.    ROS:  Constitutional: No unintentional weight loss  Endo: Denies excessive thirst or frequent urination    Past Medical History:  Patient Active Problem List    Diagnosis Date Noted   • Vitamin D insufficiency 08/31/2014   • Hypothyroidism 08/08/2014   • Elevated fasting blood sugar 08/08/2014   • Vitamin D deficiency 08/08/2014   • Hot flash, menopausal 08/08/2014   • Morbid obesity (CMS-HCC) 08/08/2014   • 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   • HTN (hypertension) 09/17/2012   • Dyslipidemia 09/17/2012   • Obesity 09/17/2012     Labs:   LabsFrom November 2017: Total cholesterol 182, HDL 28, LDL 98, triglycerides 279, TSH 1.14, free T4 1.53, vitamin D 43    Physical Examination:  Vital signs: /82   Pulse 95   Ht 1.727 m (5' 8\")   Wt 119.4 kg (263 lb 3.2 oz)   SpO2 95%   BMI 40.02 kg/m²   General: No apparent distress, cooperative  Eyes: No scleral icterus, no discharge, normal eyelids  Neck: No abnormal masses on inspection   Resp: Normal effort  Psych: Alert and oriented, normal mood and affect    Assessment and Plan:    1. Acquired hypothyroidism  · Recent TSH is 1.14  · Continue Synthroid 112 µg daily  · Resume Cytomel 5 µg daily, she had " normal thyroid hormone levels on this regimen in the past  · Repeat labs in 6 months  - TSH; Future  - FREE THYROXINE; Future    2. Mixed hyperlipidemia  · Recent triglycerides 279, LDL 98  · Continue lovaza and pravastatin  · We again discussed about diet and lifestyle modification  · Repeat labs in 6 months    3. Vitamin D deficiency  · Continue vitamin D 2000 international units daily  · Repeat labs in 6 months  - VITAMIN D,25 HYDROXY; Future    4. Severe obesity (BMI >= 40) (CMS-HCC)  · We again discussed about diet and lifestyle modifications for weight loss  · Repeat fasting labs in 6 months  - HEMOGLOBIN A1C    Return in about 6 months (around 5/13/2018).    Thank you for allowing me to participate in the care of this patient.    Benita Nolasco M.D.    CC:   Prasanna Zamarripa M.D.    This note was created using voice recognition software (Dragon). The accuracy of the dictation is limited by the abilities of the software. I have reviewed the note prior to signing, however some errors in grammar and context are still possible. If you have any questions related to this note please do not hesitate to contact our office.

## 2017-11-17 ENCOUNTER — HOSPITAL ENCOUNTER (OUTPATIENT)
Dept: RADIOLOGY | Facility: MEDICAL CENTER | Age: 57
End: 2017-11-17
Attending: OBSTETRICS & GYNECOLOGY
Payer: COMMERCIAL

## 2017-11-17 DIAGNOSIS — Z12.31 VISIT FOR SCREENING MAMMOGRAM: ICD-10-CM

## 2017-11-17 PROCEDURE — G0202 SCR MAMMO BI INCL CAD: HCPCS

## 2017-12-05 RX ORDER — PRAVASTATIN SODIUM 20 MG
TABLET ORAL
Qty: 90 TAB | Refills: 3 | Status: SHIPPED | OUTPATIENT
Start: 2017-12-05 | End: 2019-02-22 | Stop reason: SDUPTHER

## 2018-01-22 RX ORDER — LEVOTHYROXINE SODIUM 112 MCG
TABLET ORAL
Qty: 90 TAB | Refills: 1 | Status: SHIPPED | OUTPATIENT
Start: 2018-01-22 | End: 2018-06-07 | Stop reason: SDUPTHER

## 2018-03-02 DIAGNOSIS — R60.0 BILATERAL EDEMA OF LOWER EXTREMITY: ICD-10-CM

## 2018-03-02 DIAGNOSIS — R06.02 SHORTNESS OF BREATH: ICD-10-CM

## 2018-03-05 RX ORDER — FUROSEMIDE 40 MG/1
TABLET ORAL
Qty: 90 TAB | Refills: 3 | Status: SHIPPED | OUTPATIENT
Start: 2018-03-05 | End: 2019-01-29 | Stop reason: SDUPTHER

## 2018-05-14 ENCOUNTER — OFFICE VISIT (OUTPATIENT)
Dept: ENDOCRINOLOGY | Facility: MEDICAL CENTER | Age: 58
End: 2018-05-14
Payer: COMMERCIAL

## 2018-05-14 VITALS
DIASTOLIC BLOOD PRESSURE: 90 MMHG | BODY MASS INDEX: 39.1 KG/M2 | OXYGEN SATURATION: 92 % | SYSTOLIC BLOOD PRESSURE: 140 MMHG | WEIGHT: 258 LBS | HEART RATE: 80 BPM | HEIGHT: 68 IN

## 2018-05-14 DIAGNOSIS — E03.9 ACQUIRED HYPOTHYROIDISM: ICD-10-CM

## 2018-05-14 DIAGNOSIS — E55.9 VITAMIN D DEFICIENCY: ICD-10-CM

## 2018-05-14 DIAGNOSIS — E78.2 MIXED HYPERLIPIDEMIA: ICD-10-CM

## 2018-05-14 DIAGNOSIS — E66.9 OBESITY (BMI 30-39.9): ICD-10-CM

## 2018-05-14 PROCEDURE — 99214 OFFICE O/P EST MOD 30 MIN: CPT | Performed by: INTERNAL MEDICINE

## 2018-05-14 RX ORDER — LIOTHYRONINE SODIUM 5 UG/1
5 TABLET ORAL 2 TIMES DAILY
Qty: 180 TAB | Refills: 2 | Status: SHIPPED | OUTPATIENT
Start: 2018-05-14 | End: 2019-11-21 | Stop reason: SDUPTHER

## 2018-05-14 NOTE — PROGRESS NOTES
"Endocrinology Clinic Progress Note    CC: Hypothyroidism    HPI:  Nazia Lyle is a 57 y.o. old patient who comes in today for routine follow up.     Hypothyroidism: She is currently on Synthroid 112 µg daily and Cytomel 5 µg daily. Overall she feels better. Energy levels are good. She reports compliance with medications.    Obesity: She continues to work on diet and lifestyle modification. She has lost about 12 pounds over the past year.    Vitamin D deficiency: She is currently on vitamin D 2000 international units daily.    Hyperlipidemia: She is currently on lovaza and pravastatin, tolerating well. Recent triglycerides are normal.    ROS:  Constitutional: Positive for weight loss  Endo: Denies excessive thirst or frequent urination    Past Medical History:  Patient Active Problem List    Diagnosis Date Noted   • Vitamin D insufficiency 08/31/2014   • Hypothyroidism 08/08/2014   • Elevated fasting blood sugar 08/08/2014   • Vitamin D deficiency 08/08/2014   • Hot flash, menopausal 08/08/2014   • Morbid obesity (HCC) 08/08/2014   • 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   • HTN (hypertension) 09/17/2012   • Dyslipidemia 09/17/2012   • Obesity 09/17/2012       Physical Examination:  Vital signs: /90   Pulse 80   Ht 1.727 m (5' 8\")   Wt 117 kg (258 lb)   SpO2 92%   BMI 39.23 kg/m²   General: No apparent distress, cooperative  Eyes: No scleral icterus, no discharge, normal eyelids  Neck: No abnormal masses on inspection   Resp: Normal effort, clear to auscultation bilaterally  CVS: Regular rate and rhythm, normal S1 and S2  Psych: Alert and oriented, normal mood and affect    Assessment and Plan:    1. Acquired hypothyroidism  · Recent TSH is 3.5  · Continue Synthroid 112 µg daily  · Increased Cytomel to 5 µg twice a day  · Repeat TSH and free T4 in 2 months and then again in 6 months    2. Mixed hyperlipidemia  · Continue lovaza and pravastatin  · We again discussed about diet and lifestyle " modification    3. Vitamin D deficiency  · Continue vitamin D 2000 international units daily    4. Obesity  · BMI 39.2  · We again discussed about diet and lifestyle modifications for weight loss    Return in about 6 months (around 11/14/2018).    Thank you for allowing me to participate in the care of this patient.    Benita Nolasco M.D.    This note was created using voice recognition software (Dragon). The accuracy of the dictation is limited by the abilities of the software. I have reviewed the note prior to signing, however some errors in grammar and context are still possible. If you have any questions related to this note please do not hesitate to contact our office.

## 2018-06-29 DIAGNOSIS — I10 ESSENTIAL HYPERTENSION: ICD-10-CM

## 2018-06-29 RX ORDER — VALSARTAN 160 MG/1
160 TABLET ORAL DAILY
Qty: 90 TAB | Refills: 0 | Status: SHIPPED | OUTPATIENT
Start: 2018-06-29 | End: 2018-09-26 | Stop reason: SDUPTHER

## 2018-09-26 ENCOUNTER — OFFICE VISIT (OUTPATIENT)
Dept: CARDIOLOGY | Facility: MEDICAL CENTER | Age: 58
End: 2018-09-26
Payer: COMMERCIAL

## 2018-09-26 VITALS
HEART RATE: 88 BPM | DIASTOLIC BLOOD PRESSURE: 73 MMHG | HEIGHT: 68 IN | SYSTOLIC BLOOD PRESSURE: 150 MMHG | WEIGHT: 264 LBS | OXYGEN SATURATION: 96 % | BODY MASS INDEX: 40.01 KG/M2

## 2018-09-26 DIAGNOSIS — I10 ESSENTIAL HYPERTENSION: ICD-10-CM

## 2018-09-26 DIAGNOSIS — E78.5 DYSLIPIDEMIA: ICD-10-CM

## 2018-09-26 PROCEDURE — 99214 OFFICE O/P EST MOD 30 MIN: CPT | Performed by: INTERNAL MEDICINE

## 2018-09-26 RX ORDER — VALSARTAN 160 MG/1
160 TABLET ORAL DAILY
Qty: 90 TAB | Refills: 0 | Status: SHIPPED | OUTPATIENT
Start: 2018-09-26 | End: 2019-01-09 | Stop reason: SDUPTHER

## 2018-09-26 ASSESSMENT — ENCOUNTER SYMPTOMS
STRIDOR: 0
NERVOUS/ANXIOUS: 0
HEARTBURN: 0
COUGH: 0
EYES NEGATIVE: 1
INSOMNIA: 0
PALPITATIONS: 0
DEPRESSION: 0
SHORTNESS OF BREATH: 0
WEIGHT LOSS: 0
NAUSEA: 0
FEVER: 0
MUSCULOSKELETAL NEGATIVE: 1
LOSS OF CONSCIOUSNESS: 0
BRUISES/BLEEDS EASILY: 0
DIZZINESS: 0

## 2018-09-27 NOTE — PROGRESS NOTES
Chief Complaint   Patient presents with   • HTN (Controlled)     follow up       Subjective:   Nazia Lyle is a 58 y.o. female who presents today in follow-up in regards to her hypertension hyperlipidemia and family history of heart disease    Trying to lose weight.  Still working full-time very active.  Blood pressure at home much better she brings in her log.  Tolerating her ARB    Past Medical History:   Diagnosis Date   • Dyslipidemia 9/17/2012   • History of kidney stones    • HTN (hypertension) 9/17/2012   • Obesity 9/17/2012     Past Surgical History:   Procedure Laterality Date   • HARDWARE REMOVAL UPPER EXTREMITY Left 8/11/2015    Procedure: HARDWARE REMOVAL UPPER EXTREMITY;  Surgeon: Chaka Mckeon M.D.;  Location: SURGERY [facility];  Service:    • APPENDECTOMY     • CHOLECYSTECTOMY     • PRIMARY C SECTION     • TONSILLECTOMY     • TONSILLECTOMY       Family History   Problem Relation Age of Onset   • Thyroid Mother    • Diabetes Father    • Hypertension Father    • Diabetes Maternal Grandmother    • Diabetes Maternal Grandfather    • Heart Disease Paternal Grandmother    • Cancer Paternal Grandfather      Social History     Social History   • Marital status:      Spouse name: N/A   • Number of children: N/A   • Years of education: N/A     Occupational History   • Not on file.     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     • B Complex  "Vitamins (VITAMIN B COMPLEX PO) Take  by mouth.     • valsartan (DIOVAN) 160 MG Tab Take 1 Tab by mouth every day. 90 Tab 0   • SYNTHROID 112 MCG Tab TAKE 1 TABLET ORALLY EVERY MORNING ON AN EMPTYSTOMACH 90 Tab 1   • liothyronine (CYTOMEL) 5 MCG Tab Take 1 Tab by mouth 2 Times a Day. (Patient taking differently: Take 10 mcg by mouth 2 Times a Day.) 180 Tab 2   • furosemide (LASIX) 40 MG Tab TAKE 1 TABLET BY MOUTH EVERY MORNING 90 Tab 3   • pravastatin (PRAVACHOL) 20 MG Tab TAKE 1 TABLET BY MOUTH EVERY NIGHT AT BEDTIME 90 Tab 3   • omega-3 acid ethyl esters (LOVAZA) 1 GM capsule Take 2 Caps by mouth 2 Times a Day. 360 Cap 3   • Probiotic Product (SOLUBLE FIBER/PROBIOTICS PO) Take  by mouth.     • [DISCONTINUED] valsartan (DIOVAN) 160 MG Tab Take 1 Tab by mouth every day. Needs to be seen for further refills. Thank you 90 Tab 0   • [DISCONTINUED] colestipol (COLESTID) 1 GM Tab   0   • [DISCONTINUED] ibuprofen (ADVIL) 200 MG TABS Take 600 mg by mouth every 6 hours as needed.       No facility-administered encounter medications on file as of 9/26/2018.      Review of Systems   Constitutional: Negative for fever, malaise/fatigue and weight loss.   HENT: Negative for hearing loss.    Eyes: Negative.    Respiratory: Negative for cough, shortness of breath and stridor.    Cardiovascular: Positive for leg swelling (Stable no changes). Negative for chest pain and palpitations.   Gastrointestinal: Negative for heartburn and nausea.   Musculoskeletal: Negative.    Neurological: Negative for dizziness and loss of consciousness.   Endo/Heme/Allergies: Does not bruise/bleed easily.   Psychiatric/Behavioral: Negative for depression. The patient is not nervous/anxious and does not have insomnia.    All other systems reviewed and are negative.       Objective:   /73 (BP Location: Left arm, Patient Position: Sitting)   Pulse 88   Ht 1.727 m (5' 8\")   Wt 119.7 kg (264 lb)   SpO2 96%   BMI 40.14 kg/m²     Physical Exam "   Constitutional: She is oriented to person, place, and time.   Morbid obesity talkative well-kempt   HENT:   Head: Normocephalic and atraumatic.   Eyes: Pupils are equal, round, and reactive to light. EOM are normal.   Neck: No JVD present. No thyromegaly present.   Cardiovascular: Normal rate, regular rhythm and intact distal pulses.    No murmur heard.  Pulmonary/Chest: Breath sounds normal. She exhibits no tenderness.   Abdominal: Bowel sounds are normal.   Musculoskeletal: She exhibits edema (Mild nonpitting bilaterally chronic stasis).   Lymphadenopathy:     She has no cervical adenopathy.   Neurological: She is alert and oriented to person, place, and time. She exhibits normal muscle tone. Coordination normal.   Skin: Skin is warm and dry.   Psychiatric: She has a normal mood and affect. Her behavior is normal.       Assessment:     1. Essential hypertension  TSH+FREE T4    COMP METABOLIC PANEL    CBC WITH DIFFERENTIAL    LIPID PANEL    valsartan (DIOVAN) 160 MG Tab   2. Dyslipidemia         Medical Decision Making:  Today's Assessment / Status / Plan:     Concern for heart disease  I reordered labs she is following up with her endocrinologist  She is following closely about her diabetes concerns and her lipids  Medications reviewed including her statin  Echocardiogram normal and reassuring.  Weight loss paramount.  She is quite aware of this    Hypertension  As above  Labs ordered follow with PCP  She lives remotely and will be an excellent candidate for hypertension clinic she will think about it but is optimistic    Hyperlipidemia  As above  Labs ordered  She may not fast because she does not have much time, we will see how the labs look in this situation and reorder if too high    Follow-up 1 year or as needed

## 2018-12-17 ENCOUNTER — OFFICE VISIT (OUTPATIENT)
Dept: ENDOCRINOLOGY | Facility: MEDICAL CENTER | Age: 58
End: 2018-12-17
Payer: COMMERCIAL

## 2018-12-17 VITALS
OXYGEN SATURATION: 95 % | SYSTOLIC BLOOD PRESSURE: 148 MMHG | HEART RATE: 96 BPM | BODY MASS INDEX: 40.25 KG/M2 | HEIGHT: 68 IN | WEIGHT: 265.6 LBS | DIASTOLIC BLOOD PRESSURE: 82 MMHG

## 2018-12-17 DIAGNOSIS — E03.9 ACQUIRED HYPOTHYROIDISM: ICD-10-CM

## 2018-12-17 PROCEDURE — 99213 OFFICE O/P EST LOW 20 MIN: CPT | Performed by: INTERNAL MEDICINE

## 2018-12-17 NOTE — PROGRESS NOTES
Chief Complaint   Patient presents with   • Hypothyroidism        HPI:    Hypothyroidism         I am assuming care of this patient who previously had seen Dr. Nolasco and Dr. Mane previously.  The etiology of her hypothyroidism is not apparent.  No mention made of Hashimoto's.  To her knowledge she has never had thyroid enlargement.  She currently does not have symptoms in the area of her thyroid.  On my examination her thyroid is about normal size and nontender.  I cannot feel thyroid nodules.         Thyroid gland is asymptomatic. Patient is not aware of any change in her gland. No discomfort. No difficulty swallowing, breathing, or voice change.           She is taking brand Synthroid 112 mcg/day and liothyronine 5 mcg twice daily with the notable exception that she often misses the second dose.  I told her if that is the case then she should take the full dose at one time in the morning.          She is clinically euthyroid.  We will update lab and report by phone.    ROS:  All other systems reported as negative or unchanged since last exam      Allergies:   Allergies   Allergen Reactions   • Latex Rash     rash   • Nkda [No Known Drug Allergy]        Current medicines including changes today:  Current Outpatient Prescriptions   Medication Sig Dispense Refill   • Multiple Vitamins-Minerals (MULTI COMPLETE PO) Take  by mouth.     • B Complex Vitamins (VITAMIN B COMPLEX PO) Take  by mouth.     • valsartan (DIOVAN) 160 MG Tab Take 1 Tab by mouth every day. 90 Tab 0   • SYNTHROID 112 MCG Tab TAKE 1 TABLET ORALLY EVERY MORNING ON AN EMPTYSTOMACH 90 Tab 1   • liothyronine (CYTOMEL) 5 MCG Tab Take 1 Tab by mouth 2 Times a Day. (Patient taking differently: Take 10 mcg by mouth 2 Times a Day.) 180 Tab 2   • furosemide (LASIX) 40 MG Tab TAKE 1 TABLET BY MOUTH EVERY MORNING 90 Tab 3   • pravastatin (PRAVACHOL) 20 MG Tab TAKE 1 TABLET BY MOUTH EVERY NIGHT AT BEDTIME 90 Tab 3   • omega-3 acid ethyl esters (LOVAZA) 1 GM  "capsule Take 2 Caps by mouth 2 Times a Day. 360 Cap 3   • Probiotic Product (SOLUBLE FIBER/PROBIOTICS PO) Take  by mouth.       No current facility-administered medications for this visit.         Past Medical History:   Diagnosis Date   • Dyslipidemia 9/17/2012   • History of kidney stones    • HTN (hypertension) 9/17/2012   • Obesity 9/17/2012       PHYSICAL EXAM:    /82 (BP Location: Right arm, Patient Position: Sitting, BP Cuff Size: Adult)   Pulse 96   Ht 1.727 m (5' 8\")   Wt 120.5 kg (265 lb 9.6 oz)   SpO2 95%   BMI 40.38 kg/m²     Gen. obese but otherwise appears healthy     Skin   appropriate for sex and age    HEENT  unremarkable    Neck   thyroid gland is palpable and about normal size.  No palpable nodules in the gland or elsewhere in the neck or supraclavicular areas.    Heart  regular    Extremities  no edema    Neuro  gait and station normal    Psych  appropriate    ASSESSMENT AND RECOMMENDATIONS    1. Acquired hypothyroidism            Plan is to update lab and report by phone.  I will also include TPO antibodies  - FREE THYROXINE; Future  - T3 FREE; Future  - TSH; Future  - THYROID PEROXIDASE  (TPO) AB; Future      DISPOSITION: If stable follow-up in 1 year      Augusto Lee M.D.    Copies to: Tee Cedeño M.D. [phone]  "

## 2019-01-09 DIAGNOSIS — I10 ESSENTIAL HYPERTENSION: ICD-10-CM

## 2019-01-10 RX ORDER — VALSARTAN 160 MG/1
TABLET ORAL
Qty: 90 TAB | Refills: 2 | Status: SHIPPED | OUTPATIENT
Start: 2019-01-10 | End: 2019-10-11 | Stop reason: SDUPTHER

## 2019-01-29 DIAGNOSIS — R06.02 SHORTNESS OF BREATH: ICD-10-CM

## 2019-01-29 DIAGNOSIS — R60.0 BILATERAL EDEMA OF LOWER EXTREMITY: ICD-10-CM

## 2019-01-31 RX ORDER — FUROSEMIDE 40 MG/1
TABLET ORAL
Qty: 90 TAB | Refills: 2 | Status: SHIPPED | OUTPATIENT
Start: 2019-01-31 | End: 2020-03-17 | Stop reason: SDUPTHER

## 2019-02-06 DIAGNOSIS — E78.2 MIXED HYPERLIPIDEMIA: ICD-10-CM

## 2019-02-06 NOTE — TELEPHONE ENCOUNTER
Was the patient seen in the last year in this department? Yes  **LOV 12/17/18**    Does patient have an active prescription for medications requested? Yes    Received Request Via: Pharmacy  **90 day supply**

## 2019-02-07 RX ORDER — LEVOTHYROXINE SODIUM 112 MCG
TABLET ORAL
Qty: 90 TAB | Refills: 1 | Status: SHIPPED | OUTPATIENT
Start: 2019-02-07 | End: 2019-10-01 | Stop reason: SDUPTHER

## 2019-02-07 RX ORDER — OMEGA-3-ACID ETHYL ESTERS 1 G/1
2 CAPSULE, LIQUID FILLED ORAL 2 TIMES DAILY
Qty: 360 CAP | Refills: 3 | OUTPATIENT
Start: 2019-02-07

## 2019-02-07 RX ORDER — PRAVASTATIN SODIUM 20 MG
20 TABLET ORAL
Qty: 90 TAB | Refills: 3 | OUTPATIENT
Start: 2019-02-07

## 2019-02-14 DIAGNOSIS — E78.2 MIXED HYPERLIPIDEMIA: ICD-10-CM

## 2019-02-14 RX ORDER — OMEGA-3-ACID ETHYL ESTERS 1 G/1
2 CAPSULE, LIQUID FILLED ORAL 2 TIMES DAILY
Qty: 360 CAP | Refills: 3 | Status: SHIPPED | OUTPATIENT
Start: 2019-02-14 | End: 2019-12-03 | Stop reason: SDUPTHER

## 2019-02-14 NOTE — TELEPHONE ENCOUNTER
Was the patient seen in the last year in this department? Yes   LOV 12/17/18 [place]  No future appt     Does patient have an active prescription for medications requested? Yes    Received Request Via: Pharmacy

## 2019-02-21 RX ORDER — PRAVASTATIN SODIUM 20 MG
20 TABLET ORAL
Qty: 90 TAB | Refills: 3 | OUTPATIENT
Start: 2019-02-21

## 2019-02-21 NOTE — TELEPHONE ENCOUNTER
Was the patient seen in the last year in this department? Yes **LOV 12/17/18**    Does patient have an active prescription for medications requested? Yes    Received Request Via: Pharmacy     (Previously filled by Dr. Nolasco)

## 2019-02-22 ENCOUNTER — TELEPHONE (OUTPATIENT)
Dept: CARDIOLOGY | Facility: MEDICAL CENTER | Age: 59
End: 2019-02-22

## 2019-02-22 DIAGNOSIS — E78.5 DYSLIPIDEMIA: ICD-10-CM

## 2019-02-22 RX ORDER — PRAVASTATIN SODIUM 20 MG
20 TABLET ORAL EVERY EVENING
Qty: 90 TAB | Refills: 3 | Status: SHIPPED | OUTPATIENT
Start: 2019-02-22 | End: 2020-02-10

## 2019-02-22 NOTE — TELEPHONE ENCOUNTER
[facility]  Mercedes Cano R.N.   Phone Number: [phone]             Ok to fill medication?    Previous Messages      ----- Message -----   From: Estrellita Zazueta, Med Ass't   Sent: 2/22/2019   8:53 AM   To: [facility]   Subject: Refill request                                   Shiv riggins,       Pt stated endocrinologist would not refill her medication & told her to request refill w [facility] for Pravastatin 20MG.   If not possible, please call her at [phone] to let her know. Thank you!             Refill Rx for Pravastatin sent to [facility] mail order [facility]

## 2019-10-01 NOTE — TELEPHONE ENCOUNTER
Was the patient seen in the last year in this department? Yes    Does patient have an active prescription for medications requested? No     Received Request Via PHARMACY      Last seen 12/17/2018

## 2019-10-02 RX ORDER — LEVOTHYROXINE SODIUM 112 MCG
TABLET ORAL
Qty: 90 TAB | Refills: 0 | Status: SHIPPED | OUTPATIENT
Start: 2019-10-02 | End: 2020-01-27 | Stop reason: SDUPTHER

## 2019-10-11 DIAGNOSIS — I10 ESSENTIAL HYPERTENSION: ICD-10-CM

## 2019-10-15 RX ORDER — VALSARTAN 160 MG/1
TABLET ORAL
Qty: 90 TAB | Refills: 0 | Status: SHIPPED | OUTPATIENT
Start: 2019-10-15 | End: 2019-12-12 | Stop reason: SDUPTHER

## 2019-11-21 DIAGNOSIS — E03.9 ACQUIRED HYPOTHYROIDISM: ICD-10-CM

## 2019-11-22 DIAGNOSIS — E78.2 MIXED HYPERLIPIDEMIA: ICD-10-CM

## 2019-11-22 DIAGNOSIS — E55.9 VITAMIN D DEFICIENCY: ICD-10-CM

## 2019-11-22 DIAGNOSIS — E03.9 ACQUIRED HYPOTHYROIDISM: ICD-10-CM

## 2019-11-22 RX ORDER — LIOTHYRONINE SODIUM 5 UG/1
5 TABLET ORAL 2 TIMES DAILY
Qty: 180 TAB | Refills: 2 | Status: SHIPPED
Start: 2019-11-22 | End: 2020-11-05

## 2019-11-22 RX ORDER — OMEGA-3-ACID ETHYL ESTERS 1 G/1
CAPSULE, LIQUID FILLED ORAL
Refills: 2 | OUTPATIENT
Start: 2019-11-22

## 2019-11-22 NOTE — TELEPHONE ENCOUNTER
Was the patient seen in the last year in this department? Yes    Does patient have an active prescription for medications requested? No     Received Request Via: Pharmacy     omega-3 acid ethyl esters (LOVAZA) 1 GM capsule        Sig: TAKE 2 CAPSULES BY MOUTH 2 TIMES A DAY

## 2019-11-22 NOTE — TELEPHONE ENCOUNTER
Was the patient seen in the last year in this department? Yes    Does patient have an active prescription for medications requested? No     Received Request Via: Pharmacy     liothyronine (CYTOMEL) 5 MCG Tab

## 2019-11-22 NOTE — TELEPHONE ENCOUNTER
Patient called checking to make sure we received the refill request for her Liothyronine. She needs it sent to [facility] mail order. She made her 1 year follow up for 12/18/19. She is requesting for lab orders to be placed for that appointment. She would like to check her vitamin d as well as her usual thyroid panel. The lab orders need to be faxed to [facility] in [place]. She would like a call back once this has been done.   none

## 2019-12-03 RX ORDER — OMEGA-3-ACID ETHYL ESTERS 1 G/1
2 CAPSULE, LIQUID FILLED ORAL 2 TIMES DAILY
Qty: 360 CAP | Refills: 3 | Status: SHIPPED | OUTPATIENT
Start: 2019-12-03 | End: 2023-04-20 | Stop reason: SDUPTHER

## 2019-12-03 NOTE — TELEPHONE ENCOUNTER
[facility] mail [facility] called about the denial for the refill request on (LOVAZA) 1 GM capsule. The denial reason was that is was never filled for patient by that provider. When it was last filled in Feb 2019 by Dr. Lee.    Please advise

## 2019-12-12 ENCOUNTER — TELEPHONE (OUTPATIENT)
Dept: CARDIOLOGY | Facility: MEDICAL CENTER | Age: 59
End: 2019-12-12

## 2019-12-12 DIAGNOSIS — I10 ESSENTIAL HYPERTENSION: ICD-10-CM

## 2019-12-12 RX ORDER — VALSARTAN 160 MG/1
160 TABLET ORAL DAILY
Qty: 90 TAB | Refills: 0 | Status: SHIPPED | OUTPATIENT
Start: 2019-12-12 | End: 2019-12-29 | Stop reason: SDUPTHER

## 2019-12-12 NOTE — TELEPHONE ENCOUNTER
[facility] pt is requesting a refill of valsartan (DIOVAN) 160 MG Tab, pt has about 5 tabs left & doesn't have a PCP to fill it. #[phone]

## 2019-12-18 ENCOUNTER — APPOINTMENT (OUTPATIENT)
Dept: ENDOCRINOLOGY | Facility: MEDICAL CENTER | Age: 59
End: 2019-12-18
Payer: COMMERCIAL

## 2019-12-18 DIAGNOSIS — I10 ESSENTIAL HYPERTENSION: ICD-10-CM

## 2019-12-29 RX ORDER — VALSARTAN 160 MG/1
160 TABLET ORAL DAILY
Qty: 90 TAB | Refills: 0 | Status: SHIPPED | OUTPATIENT
Start: 2019-12-29 | End: 2020-03-17 | Stop reason: SDUPTHER

## 2020-01-17 RX ORDER — LEVOTHYROXINE SODIUM 112 MCG
TABLET ORAL
Qty: 90 TAB | Refills: 0 | OUTPATIENT
Start: 2020-01-17

## 2020-01-27 RX ORDER — LEVOTHYROXINE SODIUM 112 MCG
TABLET ORAL
Qty: 90 TAB | Refills: 0 | Status: SHIPPED | OUTPATIENT
Start: 2020-01-27 | End: 2020-04-17

## 2020-01-27 NOTE — TELEPHONE ENCOUNTER
Was the patient seen in the last year in this department? No     Does patient have an active prescription for medications requested? No     Received Request Via: Patient     Patient called she needs a refill on her Synthroid.

## 2020-02-08 DIAGNOSIS — E78.5 DYSLIPIDEMIA: ICD-10-CM

## 2020-02-10 RX ORDER — PRAVASTATIN SODIUM 20 MG
TABLET ORAL
Qty: 90 TAB | Refills: 0 | Status: SHIPPED | OUTPATIENT
Start: 2020-02-10 | End: 2020-03-17 | Stop reason: SDUPTHER

## 2020-03-17 ENCOUNTER — OFFICE VISIT (OUTPATIENT)
Dept: ENDOCRINOLOGY | Facility: MEDICAL CENTER | Age: 60
End: 2020-03-17
Payer: COMMERCIAL

## 2020-03-17 ENCOUNTER — OFFICE VISIT (OUTPATIENT)
Dept: CARDIOLOGY | Facility: MEDICAL CENTER | Age: 60
End: 2020-03-17
Payer: COMMERCIAL

## 2020-03-17 VITALS
HEART RATE: 86 BPM | WEIGHT: 268 LBS | BODY MASS INDEX: 40.62 KG/M2 | OXYGEN SATURATION: 94 % | SYSTOLIC BLOOD PRESSURE: 138 MMHG | HEIGHT: 68 IN | DIASTOLIC BLOOD PRESSURE: 80 MMHG

## 2020-03-17 VITALS
OXYGEN SATURATION: 94 % | DIASTOLIC BLOOD PRESSURE: 74 MMHG | WEIGHT: 279 LBS | SYSTOLIC BLOOD PRESSURE: 120 MMHG | RESPIRATION RATE: 16 BRPM | HEIGHT: 68 IN | BODY MASS INDEX: 42.28 KG/M2 | HEART RATE: 102 BPM

## 2020-03-17 DIAGNOSIS — E78.5 DYSLIPIDEMIA: ICD-10-CM

## 2020-03-17 DIAGNOSIS — Z82.49 FAMILY HISTORY OF CORONARY ARTERY DISEASE: ICD-10-CM

## 2020-03-17 DIAGNOSIS — R60.0 BILATERAL EDEMA OF LOWER EXTREMITY: ICD-10-CM

## 2020-03-17 DIAGNOSIS — I10 ESSENTIAL HYPERTENSION: ICD-10-CM

## 2020-03-17 DIAGNOSIS — E03.9 ACQUIRED HYPOTHYROIDISM: ICD-10-CM

## 2020-03-17 DIAGNOSIS — E66.01 MORBID OBESITY (HCC): ICD-10-CM

## 2020-03-17 PROCEDURE — 99213 OFFICE O/P EST LOW 20 MIN: CPT | Performed by: INTERNAL MEDICINE

## 2020-03-17 PROCEDURE — 99214 OFFICE O/P EST MOD 30 MIN: CPT | Performed by: NURSE PRACTITIONER

## 2020-03-17 RX ORDER — FUROSEMIDE 40 MG/1
40 TABLET ORAL DAILY
Qty: 90 TAB | Refills: 0 | Status: SHIPPED | OUTPATIENT
Start: 2020-03-17 | End: 2020-03-17 | Stop reason: SDUPTHER

## 2020-03-17 RX ORDER — FUROSEMIDE 40 MG/1
40 TABLET ORAL DAILY
Qty: 90 TAB | Refills: 0 | Status: SHIPPED | OUTPATIENT
Start: 2020-03-17 | End: 2020-06-04

## 2020-03-17 RX ORDER — VALSARTAN 160 MG/1
160 TABLET ORAL DAILY
Qty: 90 TAB | Refills: 3 | Status: SHIPPED | OUTPATIENT
Start: 2020-03-17 | End: 2020-06-09 | Stop reason: SDUPTHER

## 2020-03-17 RX ORDER — PRAVASTATIN SODIUM 20 MG
20 TABLET ORAL DAILY
Qty: 90 TAB | Refills: 3 | Status: SHIPPED
Start: 2020-03-17 | End: 2020-11-05

## 2020-03-17 ASSESSMENT — ENCOUNTER SYMPTOMS
DOUBLE VISION: 0
ORTHOPNEA: 0
WHEEZING: 0
SHORTNESS OF BREATH: 0
DIZZINESS: 0
HEADACHES: 0
BLURRED VISION: 0
COUGH: 0
FALLS: 0
PALPITATIONS: 0
FOCAL WEAKNESS: 0
LOSS OF CONSCIOUSNESS: 0
WEAKNESS: 0
NERVOUS/ANXIOUS: 0

## 2020-03-17 NOTE — PROGRESS NOTES
Chief Complaint   Patient presents with   • HTN (Controlled)       Subjective:   Nazia Lyle is a 60 y.o. female who presents today for follow up hypertension and hypelipidemia.    She used to see Dr. Roberto in our clinic, history of hypertension, hyperlipidemia, obesity, hypothyroid followed by Dr Lee, and family history of heart disease.    She has had no episodes of chest pain, palpitations, dizziness/lightheadedness, shortness of breath, or orthopnea.    Mild lower extremity swelling, she ran out of furosemide 40mg for the last two days. She has been trying to adjust her diet to loose weight.      Past Medical History:   Diagnosis Date   • Dyslipidemia 9/17/2012   • History of kidney stones    • HTN (hypertension) 9/17/2012   • Hypothyroid    • Obesity 9/17/2012     Past Surgical History:   Procedure Laterality Date   • HARDWARE REMOVAL UPPER EXTREMITY Left 8/11/2015    Procedure: HARDWARE REMOVAL UPPER EXTREMITY;  Surgeon: Chaka Mckeon M.D.;  Location: SURGERY [facility];  Service:    • APPENDECTOMY     • CHOLECYSTECTOMY     • PRIMARY C SECTION     • TONSILLECTOMY     • TONSILLECTOMY       Family History   Problem Relation Age of Onset   • Thyroid Mother    • Diabetes Father    • Hypertension Father    • Diabetes Maternal Grandmother    • Diabetes Maternal Grandfather    • Heart Disease Paternal Grandmother    • Cancer Paternal Grandfather      Social History     Socioeconomic History   • Marital status:      Spouse name: Not on file   • Number of children: Not on file   • Years of education: Not on file   • Highest education level: Not on file   Occupational History   • Not on file   Social Needs   • Financial resource strain: Not on file   • Food insecurity     Worry: Not on file     Inability: Not on file   • Transportation needs     Medical: Not on file     Non-medical: Not on file   Tobacco Use   • Smoking status: Never Smoker   • Smokeless tobacco: Never Used   Substance and  Sexual Activity   • Alcohol use: No   • Drug use: No     Comment: , 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, works as human    • Sexual activity: Not on file   Lifestyle   • Physical activity     Days per week: Not on file     Minutes per session: Not on file   • Stress: Not on file   Relationships   • Social connections     Talks on phone: Not on file     Gets together: Not on file     Attends [employer] service: Not on file     Active member of club or organization: Not on file     Attends meetings of clubs or organizations: Not on file     Relationship status: Not on file   • Intimate partner violence     Fear of current or ex partner: Not on file     Emotionally abused: Not on file     Physically abused: Not on file     Forced sexual activity: Not on file   Other Topics Concern   • Not on file   Social History Narrative   • Not on file     Allergies   Allergen Reactions   • Latex Rash     rash   • Nkda [No Known Drug Allergy]      Outpatient Encounter Medications as of 3/17/2020   Medication Sig Dispense Refill   • fluocinolone (SYNALAR) 0.01 % Cream apply to FACE twice a day for 1 week if needed for ring     • pravastatin (PRAVACHOL) 20 MG Tab Take 1 Tab by mouth every day. 90 Tab 3   • furosemide (LASIX) 40 MG Tab Take 1 Tab by mouth every day. 90 Tab 0   • valsartan (DIOVAN) 160 MG Tab Take 1 Tab by mouth every day. 90 Tab 3   • SYNTHROID 112 MCG Tab TAKE 1 TABLET BY MOUTH EVERY MORNING ON AN EMPTY STOMACH 90 Tab 0   • omega-3 acid ethyl esters (LOVAZA) 1 GM capsule Take 2 Caps by mouth 2 Times a Day. 360 Cap 3   • liothyronine (CYTOMEL) 5 MCG Tab Take 1 Tab by mouth 2 Times a Day. 180 Tab 2   • Probiotic Product (SOLUBLE FIBER/PROBIOTICS PO) Take  by mouth.     • [DISCONTINUED] furosemide (LASIX) 40 MG Tab Take 1 Tab by mouth every day. 90 Tab 0   • [DISCONTINUED] pravastatin (PRAVACHOL) 20 MG Tab TAKE 1 TABLET BY MOUTH EVERY EVENING 90 Tab 0   • [DISCONTINUED] valsartan (DIOVAN) 160 MG  "Tab Take 1 Tab by mouth every day. For further refills please contact new cardiologist. Thank you 90 Tab 0   • [DISCONTINUED] furosemide (LASIX) 40 MG Tab TAKE 1 TABLET ORALLY EVERY MORNING 90 Tab 2   • Multiple Vitamins-Minerals (MULTI COMPLETE PO) Take  by mouth.     • B Complex Vitamins (VITAMIN B COMPLEX PO) Take  by mouth.       No facility-administered encounter medications on file as of 3/17/2020.      Review of Systems   Constitutional: Negative for malaise/fatigue.   Eyes: Negative for blurred vision and double vision.   Respiratory: Negative for cough, shortness of breath and wheezing.    Cardiovascular: Positive for leg swelling. Negative for chest pain, palpitations and orthopnea.   Musculoskeletal: Negative for falls.   Neurological: Negative for dizziness, focal weakness, loss of consciousness, weakness and headaches.   Psychiatric/Behavioral: The patient is not nervous/anxious.    All other systems reviewed and are negative.       Objective:   /80 (BP Location: Left arm, Patient Position: Sitting, BP Cuff Size: Adult)   Pulse 86   Ht 1.727 m (5' 8\")   Wt 121.6 kg (268 lb)   SpO2 94%   BMI 40.75 kg/m²     Physical Exam   Constitutional: She is oriented to person, place, and time. She appears well-developed and well-nourished. No distress.   HENT:   Head: Normocephalic and atraumatic.   Eyes: Pupils are equal, round, and reactive to light.   Neck: No JVD present.   Cardiovascular: Normal rate, regular rhythm and normal heart sounds.   No murmur heard.  Pulmonary/Chest: Effort normal and breath sounds normal. No respiratory distress.   Abdominal: Soft. Bowel sounds are normal. She exhibits no distension.   Musculoskeletal:         General: Edema (trace LE edema ) present.   Neurological: She is alert and oriented to person, place, and time.   Skin: Skin is warm and dry. She is not diaphoretic.   Psychiatric: She has a normal mood and affect. Her behavior is normal. Judgment and thought content " normal.         Echocardiography Laboratory  5/17/2017  No prior study is available for comparison.   Left ventricular ejection fraction is visually estimated to be 60%.  No significant valve disease or flow abnormalities.   Mildly eleveated right atrial pressure.  Estimated right ventricular   systolic pressure is 35 mmHg.    Assessment:     1. Essential hypertension  Comp Metabolic Panel    Lipid Profile    valsartan (DIOVAN) 160 MG Tab   2. Dyslipidemia  Comp Metabolic Panel    Lipid Profile    pravastatin (PRAVACHOL) 20 MG Tab   3. Acquired hypothyroidism     4. Bilateral edema of lower extremity  furosemide (LASIX) 40 MG Tab    DISCONTINUED: furosemide (LASIX) 40 MG Tab   5. Family history of coronary artery disease         Medical Decision Making:  Today's Assessment / Status / Plan:       1. Hypertension:  - blood pressure stable at this time  - resume furosemide 40mg qd   - continue valsartan 160mg qd     2. Dyslipidemia:  - continue pravastatin 20mg qd   - repeat lipid profile and CMP    3. Family history of coronary artery disease:  - CT calcium score to increase her statin therapy     4. Hypothyroid:  - followed by Dr. Stuart     Follow up in 6 months, sooner as needed.     Collaborating Provider: Dr. Schneider

## 2020-03-17 NOTE — LETTER
[facility]   [address] [facility] [address]  [place] [address]  Phone: [phone]  Fax: [phone]              Nazia Lyle  1960    Encounter Date: 3/17/2020    ADA Manuel          PROGRESS NOTE:  Chief Complaint   Patient presents with   • HTN (Controlled)       Subjective:   Nazia Lyle is a 60 y.o. female who presents today for follow up hypertension and hypelipidemia.    She used to see Dr. Roberto in our clinic, history of hypertension, hyperlipidemia, obesity, hypothyroid followed by Dr Lee, and family history of heart disease.    She has had no episodes of chest pain, palpitations, dizziness/lightheadedness, shortness of breath, or orthopnea.    Mild lower extremity swelling, she ran out of furosemide 40mg for the last two days. She has been trying to adjust her diet to loose weight.      Past Medical History:   Diagnosis Date   • Dyslipidemia 9/17/2012   • History of kidney stones    • HTN (hypertension) 9/17/2012   • Hypothyroid    • Obesity 9/17/2012     Past Surgical History:   Procedure Laterality Date   • HARDWARE REMOVAL UPPER EXTREMITY Left 8/11/2015    Procedure: HARDWARE REMOVAL UPPER EXTREMITY;  Surgeon: Chaka Mckeon M.D.;  Location: SURGERY [facility];  Service:    • APPENDECTOMY     • CHOLECYSTECTOMY     • PRIMARY C SECTION     • TONSILLECTOMY     • TONSILLECTOMY       Family History   Problem Relation Age of Onset   • Thyroid Mother    • Diabetes Father    • Hypertension Father    • Diabetes Maternal Grandmother    • Diabetes Maternal Grandfather    • Heart Disease Paternal Grandmother    • Cancer Paternal Grandfather      Social History     Socioeconomic History   • Marital status:      Spouse name: Not on file   • Number of children: Not on file   • Years of education: Not on file   • Highest education level: Not on file   Occupational History   • Not on file   Social Needs   • Financial resource  strain: Not on file   • Food insecurity     Worry: Not on file     Inability: Not on file   • Transportation needs     Medical: Not on file     Non-medical: Not on file   Tobacco Use   • Smoking status: Never Smoker   • Smokeless tobacco: Never Used   Substance and Sexual Activity   • Alcohol use: No   • Drug use: No     Comment: , 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, works as human    • Sexual activity: Not on file   Lifestyle   • Physical activity     Days per week: Not on file     Minutes per session: Not on file   • Stress: Not on file   Relationships   • Social connections     Talks on phone: Not on file     Gets together: Not on file     Attends [employer] service: Not on file     Active member of club or organization: Not on file     Attends meetings of clubs or organizations: Not on file     Relationship status: Not on file   • Intimate partner violence     Fear of current or ex partner: Not on file     Emotionally abused: Not on file     Physically abused: Not on file     Forced sexual activity: Not on file   Other Topics Concern   • Not on file   Social History Narrative   • Not on file     Allergies   Allergen Reactions   • Latex Rash     rash   • Nkda [No Known Drug Allergy]      Outpatient Encounter Medications as of 3/17/2020   Medication Sig Dispense Refill   • fluocinolone (SYNALAR) 0.01 % Cream apply to FACE twice a day for 1 week if needed for ring     • pravastatin (PRAVACHOL) 20 MG Tab Take 1 Tab by mouth every day. 90 Tab 3   • furosemide (LASIX) 40 MG Tab Take 1 Tab by mouth every day. 90 Tab 0   • valsartan (DIOVAN) 160 MG Tab Take 1 Tab by mouth every day. 90 Tab 3   • SYNTHROID 112 MCG Tab TAKE 1 TABLET BY MOUTH EVERY MORNING ON AN EMPTY STOMACH 90 Tab 0   • omega-3 acid ethyl esters (LOVAZA) 1 GM capsule Take 2 Caps by mouth 2 Times a Day. 360 Cap 3   • liothyronine (CYTOMEL) 5 MCG Tab Take 1 Tab by mouth 2 Times a Day. 180 Tab 2   • Probiotic Product (SOLUBLE  "FIBER/PROBIOTICS PO) Take  by mouth.     • [DISCONTINUED] furosemide (LASIX) 40 MG Tab Take 1 Tab by mouth every day. 90 Tab 0   • [DISCONTINUED] pravastatin (PRAVACHOL) 20 MG Tab TAKE 1 TABLET BY MOUTH EVERY EVENING 90 Tab 0   • [DISCONTINUED] valsartan (DIOVAN) 160 MG Tab Take 1 Tab by mouth every day. For further refills please contact new cardiologist. Thank you 90 Tab 0   • [DISCONTINUED] furosemide (LASIX) 40 MG Tab TAKE 1 TABLET ORALLY EVERY MORNING 90 Tab 2   • Multiple Vitamins-Minerals (MULTI COMPLETE PO) Take  by mouth.     • B Complex Vitamins (VITAMIN B COMPLEX PO) Take  by mouth.       No facility-administered encounter medications on file as of 3/17/2020.      Review of Systems   Constitutional: Negative for malaise/fatigue.   Eyes: Negative for blurred vision and double vision.   Respiratory: Negative for cough, shortness of breath and wheezing.    Cardiovascular: Positive for leg swelling. Negative for chest pain, palpitations and orthopnea.   Musculoskeletal: Negative for falls.   Neurological: Negative for dizziness, focal weakness, loss of consciousness, weakness and headaches.   Psychiatric/Behavioral: The patient is not nervous/anxious.    All other systems reviewed and are negative.       Objective:   /80 (BP Location: Left arm, Patient Position: Sitting, BP Cuff Size: Adult)   Pulse 86   Ht 1.727 m (5' 8\")   Wt 121.6 kg (268 lb)   SpO2 94%   BMI 40.75 kg/m²      Physical Exam   Constitutional: She is oriented to person, place, and time. She appears well-developed and well-nourished. No distress.   HENT:   Head: Normocephalic and atraumatic.   Eyes: Pupils are equal, round, and reactive to light.   Neck: No JVD present.   Cardiovascular: Normal rate, regular rhythm and normal heart sounds.   No murmur heard.  Pulmonary/Chest: Effort normal and breath sounds normal. No respiratory distress.   Abdominal: Soft. Bowel sounds are normal. She exhibits no distension.   Musculoskeletal:     "      General: Edema (trace LE edema ) present.   Neurological: She is alert and oriented to person, place, and time.   Skin: Skin is warm and dry. She is not diaphoretic.   Psychiatric: She has a normal mood and affect. Her behavior is normal. Judgment and thought content normal.         Echocardiography Laboratory  5/17/2017  No prior study is available for comparison.   Left ventricular ejection fraction is visually estimated to be 60%.  No significant valve disease or flow abnormalities.   Mildly eleveated right atrial pressure.  Estimated right ventricular   systolic pressure is 35 mmHg.    Assessment:     1. Essential hypertension  Comp Metabolic Panel    Lipid Profile    valsartan (DIOVAN) 160 MG Tab   2. Dyslipidemia  Comp Metabolic Panel    Lipid Profile    pravastatin (PRAVACHOL) 20 MG Tab   3. Acquired hypothyroidism     4. Bilateral edema of lower extremity  furosemide (LASIX) 40 MG Tab    DISCONTINUED: furosemide (LASIX) 40 MG Tab   5. Family history of coronary artery disease         Medical Decision Making:  Today's Assessment / Status / Plan:       1. Hypertension:  - blood pressure stable at this time  - resume furosemide 40mg qd   - continue valsartan 160mg qd     2. Dyslipidemia:  - continue pravastatin 20mg qd   - repeat lipid profile and CMP    3. Family history of coronary artery disease:  - CT calcium score to increase her statin therapy     4. Hypothyroid:  - followed by Dr. Stuart     Follow up in 6 months, sooner as needed.     Collaborating Provider: Dr. Jennie Cedeño M.D.  [address] [facility] [address]  VIA Facsimile: [phone]

## 2020-03-17 NOTE — PROGRESS NOTES
Chief Complaint   Patient presents with   • Hypothyroidism   • Obesity        HPI:    Follow-up for hypothyroidism.  Patient is in transition since her primary care doctor in [place] has retired and her cardiologist at [facility] is also left.  She needs to establish with new doctors and reorient.  I have not seen her since December 2018.  She is very busy in school administration.  Cardiologist have been following her for hypertension and hyperlipidemia.    Hypothyroidism            Clinically euthyroid taking Synthroid 112 mcg/day and liothyronine 10 mcg/day.  However she often misses liothyronine because it makes her feel as though her blood pressure might be going up.  The last thyroid levels I have were from May 2018 where her T4 was mid range at 1.2 and TSH upper normal around 3.5.  I think we increased her liothyronine at that time but she is unable to take it because it bothers her.    Obesity           This is an obvious problem and she has been on a variety of diets which have not been successful.  Her weight is up 14 pounds from the last time I saw her in 2018.  I think she needs some professional evaluation and guidance.  I will refer her to Dr. Harris and she is in favor of that.      ROS:  Very busy at work.   at [employer]      Allergies:   Allergies   Allergen Reactions   • Latex Rash     rash   • Nkda [No Known Drug Allergy]        Current medicines including changes today:  Current Outpatient Medications   Medication Sig Dispense Refill   • pravastatin (PRAVACHOL) 20 MG Tab TAKE 1 TABLET BY MOUTH EVERY EVENING 90 Tab 0   • SYNTHROID 112 MCG Tab TAKE 1 TABLET BY MOUTH EVERY MORNING ON AN EMPTY STOMACH 90 Tab 0   • valsartan (DIOVAN) 160 MG Tab Take 1 Tab by mouth every day. For further refills please contact new cardiologist. Thank you 90 Tab 0   • omega-3 acid ethyl esters (LOVAZA) 1 GM capsule Take 2 Caps by mouth 2 Times a Day. 360 Cap 3   • liothyronine (CYTOMEL) 5  "MCG Tab Take 1 Tab by mouth 2 Times a Day. 180 Tab 2   • furosemide (LASIX) 40 MG Tab TAKE 1 TABLET ORALLY EVERY MORNING 90 Tab 2   • Multiple Vitamins-Minerals (MULTI COMPLETE PO) Take  by mouth.     • B Complex Vitamins (VITAMIN B COMPLEX PO) Take  by mouth.     • Probiotic Product (SOLUBLE FIBER/PROBIOTICS PO) Take  by mouth.       No current facility-administered medications for this visit.         Past Medical History:   Diagnosis Date   • Dyslipidemia 9/17/2012   • History of kidney stones    • HTN (hypertension) 9/17/2012   • Obesity 9/17/2012       PHYSICAL EXAM:    /74   Pulse (!) 102   Resp 16   Ht 1.727 m (5' 8\")   Wt (!) 126.6 kg (279 lb)   SpO2 94%   BMI 42.42 kg/m²                No physical examination done today because we are in the middle of the viral pandemic  Gen.   appears healthy     Skin   appropriate for sex and age    HEENT  unremarkabl by observation    Heart  regular    Extremities  no edema    Neuro  gait and station normal    Psych  appropriate    ASSESSMENT AND RECOMMENDATIONS    1. Acquired hypothyroidism             She is not tolerating liothyronine and I do not think she is overtreated clinically.  We will update her thyroid levels now and adjust by telephone.  I will have her discontinue liothyronine and we will increase the dose of levothyroxine once I see her l current levels.    2. Morbid obesity (HCC)              See HPI               Referral to Dr. Harris               Establish with new cardiologist and PCP      DISPOSITION: Follow-up current lab by telephone and readjust thyroid dose      Augusto Lee M.D.    Copies to: None  "

## 2020-04-17 RX ORDER — LEVOTHYROXINE SODIUM 112 MCG
TABLET ORAL
Qty: 90 TAB | Refills: 0 | Status: SHIPPED | OUTPATIENT
Start: 2020-04-17 | End: 2020-07-09

## 2020-04-17 NOTE — TELEPHONE ENCOUNTER
Received request via: Pharmacy    Was the patient seen in the last year in this department? Yes    Does the patient have an active prescription (recently filled or refills available) for medication(s) requested? No     SYNTHROID 112 MCG Tab        Sig: TAKE 1 TABLET BY MOUTH EVERY MORNING ON AN EMPTY STOMACH

## 2020-04-20 ENCOUNTER — OFFICE VISIT (OUTPATIENT)
Dept: HEALTH INFORMATION MANAGEMENT | Facility: MEDICAL CENTER | Age: 60
End: 2020-04-20
Payer: COMMERCIAL

## 2020-04-20 VITALS
BODY MASS INDEX: 42.27 KG/M2 | DIASTOLIC BLOOD PRESSURE: 82 MMHG | WEIGHT: 278.9 LBS | SYSTOLIC BLOOD PRESSURE: 128 MMHG | OXYGEN SATURATION: 92 % | HEART RATE: 102 BPM | HEIGHT: 68 IN

## 2020-04-20 DIAGNOSIS — I10 ESSENTIAL HYPERTENSION: ICD-10-CM

## 2020-04-20 DIAGNOSIS — I51.7 LVH (LEFT VENTRICULAR HYPERTROPHY): ICD-10-CM

## 2020-04-20 DIAGNOSIS — E66.01 MORBID OBESITY (HCC): ICD-10-CM

## 2020-04-20 DIAGNOSIS — E55.9 VITAMIN D DEFICIENCY: ICD-10-CM

## 2020-04-20 DIAGNOSIS — K52.9 INFLAMMATORY BOWEL DISEASE: ICD-10-CM

## 2020-04-20 DIAGNOSIS — E78.5 DYSLIPIDEMIA: ICD-10-CM

## 2020-04-20 DIAGNOSIS — R63.5 WEIGHT GAIN: ICD-10-CM

## 2020-04-20 PROCEDURE — 99205 OFFICE O/P NEW HI 60 MIN: CPT | Performed by: INTERNAL MEDICINE

## 2020-04-20 PROCEDURE — 93000 ELECTROCARDIOGRAM COMPLETE: CPT | Performed by: INTERNAL MEDICINE

## 2020-04-20 ASSESSMENT — PATIENT HEALTH QUESTIONNAIRE - PHQ9: CLINICAL INTERPRETATION OF PHQ2 SCORE: 0

## 2020-04-20 NOTE — PROGRESS NOTES
"Bariatric Medicine H&P  Chief Complaint   Patient presents with   • Weight Gain       Referred by:  Augusto Lee M.D.    History of Present Illness:   Nazia Lyle is a 60 y.o.  female who presents for weight management and to help address co-morbidities caused by overweight, as below.    The patient has tried many many diets, has inflammatory bowel disease and is not sure what to eat for her bowels.  Since she was diagnosed 4 years ago, has difficulty with weight gain.  She has tried almost every fat diet, all liquid diet.  Recently she has tracked with my fitness pal, but forgets to maintain it.    Brief Diet History (see also RD notes):  AM: Skips  Lunch: Salad with protein  Dinner: Salad with protein  Snacks: Nuts, sweets  Drinks: Water, coffee    VDD: Not on current  supplement  HTN: Blood pressure controlled  HLD: Continues daily statin  IBD: Not currently on steroids    Behavior-Related History:  Binge eating screen: Negative  H/o abuse: None     Exercise:   None     Review of Systems   Positive for chronic fatigue, lack of energy, back and joint pain  Sleep apnea screen: None  All other ROS were reviewed with patient today and are negative.      PMH/PSH:  I have reviewed the patient's medical, social and family history, allergies, and medications today.  Prior records reviewed.  Personal Hx of Bariatric Surgery: None   for [facility] in [place]    Physical Exam:   /82 (BP Location: Left arm, Patient Position: Sitting, BP Cuff Size: Large adult)   Pulse (!) 102   Ht 1.727 m (5' 8\")   Wt (!) 126.5 kg (278 lb 14.4 oz)   SpO2 92%   BMI 42.41 kg/m²   Waist Measurement   Waist: 48.5 inch/inches  Body fat % 53  REE 1807 kcal/day    Constitutional: Oriented to person, place, and time and well-developed, well-nourished, and in no distress.    HENT: No facial plethora.  No Cushingoid features.  No scalloped tongue.  No dental erosions.  No swollen parotids.  Head: " Normocephalic.   Eyes: EOM are normal. Pupils are equal, round, and reactive to light. No periorbital edema.  No lateral thinning of eyebrows.  No vertical nystagmus.  Neck: Normal range of motion. Neck supple. No thyromegaly present. No buffalo hump.  Cardiovascular: Normal rate and regular rhythm.  No murmur heard.  Pulmonary/Chest: Effort normal and breath sounds normal. No wheezes.   Abdominal: Soft. Bowel sounds are normal.  Grade 1 pannus.  No ascites.  No hepatosplenomegaly.   Musculoskeletal: Normal range of motion. No edema.   Neurological: Alert and oriented to person, place, and time. Normal reflexes. No cranial nerve deficit. No muscle weakness.  Gait normal.   Skin: Warm and dry. Not diaphoretic. No hirsuitism.  No acanthosis nigricans.  Not excessively dry, scaly.  No acne.  No bruising/ecchymosis.  No hyperpigmentation.  No xanthomas or acrochordon.    Psychiatric: Mood, memory, affect and judgment normal.     Laboratory:   Prior labs reviewed.  EKG: Sinus rhythm, LVH, rate 81, no ST-T abnormalities.  Corrected QT 0.439  Ordered, performed in our office today, and reviewed by me today.    Dietitian Assessment: I have reviewed the Dietitian's assessment related to this encounter.       ASSESSMENT/PLAN:  Body mass index is 42.41 kg/m².   Obesity Stage ([place]) 1; Class 3    1. Morbid obesity (HCC)     2. Vitamin D deficiency     3. Essential hypertension     4. Dyslipidemia     5. Weight gain     6. Inflammatory bowel disease     7. LVH (left ventricular hypertrophy)         The patient has tried multiple diets in the past.  Will focus more on sustainability.  Start stimulus narrowing, tracking, work on reducing refined CHO intake, in a way that is less overwhelming.  Monitor vitamin D, blood pressure, lipids.  IBD currently stable.  She will need to increase her activity level to maintain weight loss long-term.    The patient and I have discussed at length and agree to the following recommendations,  which are all addressing the above diagnoses:    Weight Goal: 5% wt loss at one month after start (pt goal weight is 200 lb)  Diet:     MR: Consider 1 ND every morning instead of skipping  High Protein/Low Carb Meals and 2 snacks between meals daily  >100 g protein, <100 g total carbs daily, less than 1400 kcals per day, start with less than 1600/day  Track daily intake with My Fitness Pal, bring to next visit  64+ oz water per day  Physical Activity: Must increase, set goals next visit  Risk level for moderate/vigorous exercise program:   Low  New Rx: Consider anti-obesity medication pending course  Behavior change:   Must increase her activity level  Follow-up: one month with MD, 2 wks with RD    Face to face time spent 60 minutes,  with >50% of time devoted to one on one counseling on weight management issues, as documented above.      Patient's body mass index is 42.41 kg/m². Exercise and nutrition counseling were performed at this visit.        Thank you for your referral!

## 2020-04-22 ENCOUNTER — NON-PROVIDER VISIT (OUTPATIENT)
Dept: HEALTH INFORMATION MANAGEMENT | Facility: MEDICAL CENTER | Age: 60
End: 2020-04-22
Payer: COMMERCIAL

## 2020-04-22 DIAGNOSIS — E66.01 MORBID OBESITY (HCC): ICD-10-CM

## 2020-04-22 DIAGNOSIS — I10 ESSENTIAL HYPERTENSION: ICD-10-CM

## 2020-04-22 DIAGNOSIS — K52.9 INFLAMMATORY BOWEL DISEASE: ICD-10-CM

## 2020-04-22 DIAGNOSIS — E55.9 VITAMIN D DEFICIENCY: ICD-10-CM

## 2020-04-22 DIAGNOSIS — R73.01 ELEVATED FASTING BLOOD SUGAR: ICD-10-CM

## 2020-04-22 DIAGNOSIS — E03.9 ACQUIRED HYPOTHYROIDISM: ICD-10-CM

## 2020-04-22 DIAGNOSIS — Z82.49 FAMILY HISTORY OF CORONARY ARTERY DISEASE: ICD-10-CM

## 2020-04-22 DIAGNOSIS — R63.5 WEIGHT GAIN: ICD-10-CM

## 2020-04-22 DIAGNOSIS — E78.5 DYSLIPIDEMIA: ICD-10-CM

## 2020-04-22 PROCEDURE — 97802 MEDICAL NUTRITION INDIV IN: CPT | Mod: 95,CR | Performed by: DIETITIAN, REGISTERED

## 2020-04-22 NOTE — PROGRESS NOTES
"4/22/2020     Nazia Lyle 60 y.o.     This encounter was conducted via Zoom.   Verbal consent was obtained. Patient's identity was verified.           Time in/out: 8:30-9:05    Anthropometrics/Objective  Vitals 4/20/2020   WEIGHT 278.9   HEIGHT 5' 8\"   BMI 42.41 kg/m2   Stated Goal Weight: see MD note  See comprehensive patient history form for further information     Subjective:  Pt is here today for the initial screening visit for the medical weight management program.     - has IBD  - does nto want to take anti-obesity meds at this time  - usually eats salads for lunch and dinner  - has done weight loss programs and fad diets in the past  - hx of cholecystectomy  - notices certain foods like processed proteins make her swell   - usually skips Breakfast  - will be having MR for breakfast  - will be tracking on MFP  See HIP Medical Questionnaire in media for more detailed diet history     Drinks: tea with 3 tbsp creamer (unsweetened), water     Nutrition Diagnosis (PES Statement)  · Obesity related to excessive energy intake and inadequate energy expenditure as evidenced by BMI >30     Client history:  Condition(s) associated with a diagnosis or treatment / Pertinent Medical Hx: HTN, Dyslipidemia, hypothyroidism, elevated fasting blood sugar, obesity, vit d def, family history of CAD, weight gain, IBD    Biochemical data, medical test and procedures  No results found for: HBA1C  No results found for: POCGLUCOSE  No results found for: CHOLSTRLTOT, LDL, HDL, TRIGLYCERIDE      Nutrition Intervention  Nutrition Prescription  Recommended Daily Kcals: 7099-1796 Kcal based on REE of 1807 per MD    Recommended Daily Protein: 100g or ~30% of kcals      Meal Plan Recommendation   Calorie controlled, high protein, low carb diet    with 1 meal replacements per day  1-2 snacks; 1 oz protein + non-starchy veggies or 1 fruit      Comprehensive Nutrition Education Instruction or training leading to in-depth nutrition " related knowledge about:   Benefits to following meal plan, combine carb, protein and fat at each meal, meal timing and spacing, portion control, sweets and alcohol in moderation.  Handouts provided regarding topics discussed:   - Meal Plan   - My Plate Planner    - Snack list with fruit   - Meal ideas   - MyFitnessPal How-To Guide    Monitoring & Evaluation Plan    Behavioral-Environmental:  Behavior: Keep a food journal and bring to next appointment   Physical activity: Did not discuss today     Food / Nutrient Intake:  Food intake: Follow meal plan as discussed. Avoid concentrated sweets and processed carbs. Use the plate method for portion control and macronutrient balance. Limit carbs/starch to up to 1 cup per meal. Snacks should be 1oz protein + ns veggies or fruit. Fruit once per day.     Fluid intake: Consume at least 64 oz water per day. Avoid all sweetened beverages. Limit coffee to 1 cup a day (ideally no cream or sugar). Limit or avoid alcohol as discussed with Dr. Barrientos.     Physical Signs / Symptoms:  Weight loss towards BMI < 30   Weight change: loss of 1-2 lbs/week    Assessment Notes:  Today I oriented Nazia to Dr. Barrientos's Medical Weight Management program. Encouraged a higher protein, lower carbohydrate, and calorie controlled meal plan consisting of 3 meals and 1-2 snacks per day with optional use of meal replacements. Encouraged patient to track their food/beverage intake on My Fitness Pal or utilize a written food journal.  We discussed how to build protein into each meal and snack, incorporating 1/2 plate non-starchy vegetable twice daily, optional 1/2 cup of complex carbohydrate at meals if desired, and avoiding refined carbohydrates and simple sugars. Reviewed snack guidelines to include 1 oz protein and optional non-starchy vegetable or 1 serving of fruit.      Nazia will be aiming for 1907-8120 kcal/d.  The pt will also be looking at the macronutrient feature and aiming  for 100g or less of carbohydrate and 100g or more of protein per day per Dr. Barrientos recommendations (or 30% or less of CHO and 30% or more of protein from calories).    Nazia has IBD that seems to be the biggest inhibitor of some of her food choices. She also states that certain foods (seemingly processed foods) tend to give her general GI discomfort, or swelling in her legs and ankles especially.  We will catarina in on these foods as we establish eating patterns. She tends to eat salads loaded with veggies that do not seem to cause GI upset. Current carb intake seems to be fairly limited. She will be focusing on the Plate Method at this time to encourage sustainability of her eating patterns and habits.     Goals:  1. Meal replacement for breakfast everyday  2. Tracking in MFP: 1600 kcal for now, 30% carb, 35% protein, 35% fat   - do not track exercise or steps in the sundar  3. More protein than total carb for yogurt choices - Triple Zero Oikos, Dannon Light and Fit, Chobani less added sugar (usually), or Serbian yogurts like Siggis, Open Nature,  Joes Serbian Yogurt, or Serbian brand usually work well for this rule!   4. Look at the 'diabetes: meals by the plate' book for meal ideas       F/U: 2 weeks, 4-6 weeks HAYDEN/MD    **This meeting was conducted via Zoom due to COVID-19 precautions**

## 2020-05-06 ENCOUNTER — NON-PROVIDER VISIT (OUTPATIENT)
Dept: HEALTH INFORMATION MANAGEMENT | Facility: MEDICAL CENTER | Age: 60
End: 2020-05-06
Payer: COMMERCIAL

## 2020-05-06 DIAGNOSIS — E03.9 ACQUIRED HYPOTHYROIDISM: ICD-10-CM

## 2020-05-06 DIAGNOSIS — E55.9 VITAMIN D DEFICIENCY: ICD-10-CM

## 2020-05-06 DIAGNOSIS — K52.9 INFLAMMATORY BOWEL DISEASE: ICD-10-CM

## 2020-05-06 DIAGNOSIS — R63.5 WEIGHT GAIN: ICD-10-CM

## 2020-05-06 DIAGNOSIS — E78.5 DYSLIPIDEMIA: ICD-10-CM

## 2020-05-06 DIAGNOSIS — R73.01 ELEVATED FASTING BLOOD SUGAR: ICD-10-CM

## 2020-05-06 DIAGNOSIS — Z82.49 FAMILY HISTORY OF CORONARY ARTERY DISEASE: ICD-10-CM

## 2020-05-06 DIAGNOSIS — E66.01 MORBID OBESITY (HCC): ICD-10-CM

## 2020-05-06 PROCEDURE — 97803 MED NUTRITION INDIV SUBSEQ: CPT | Mod: 95,CR | Performed by: DIETITIAN, REGISTERED

## 2020-05-06 NOTE — PROGRESS NOTES
This encounter was conducted via Zoom .   Verbal consent was obtained. Patient's identity was verified.    Nutrition Reassess: Medical Weight Management   Today's date: 5/6/2020  Referring Provider: No ref. provider found      Time: in/out 9:00-9:26  Visit#: 2    Subjective:  - doing 1 MR for breakfast  - tracking in [facility]- but usually misses 1 meal in tracking  - hx of cholecystectomy   - has IBD  - states she does not get enough water  - weighs proteins   - usually skips lunch as she is still full from breakfast  - is currently out of [place] (lives in [place])  - has not weighed herself    Diet history:   Breakfast - tea with 3 tbsp unsweetened cream, 1 cup blueberries with triple zero oikos yogurt and walnuts  Snack - miltons multigrain crackers (2)  Lunch - chicken salad   Snack - or snack here  Dinner - 4 oz chicken with squash and green beans    Anthropometrics/Objective  Today's weight:  There were no vitals filed for this visit.  BMI:  There is no height or weight on file to calculate BMI.  Starting weight/date 278.9 lbs (4/22/20)     Total weight change :           Meal Plan:   High protein lower carb with 0-1 meal replacements per day     ReAssesment/Notes:   Nazia has been working on her health goals through lifestyle changes. She has been tracking her intake in [facility] which she finds helpful as she was able to identify her macro intake is different than she would have thought. She was able to identify that her normal fat intake is around 50% on average. Discussed that decreasing fat intake can be beneficial for her IBD symptoms as well, parvin d/t hx of cholecystectomy. She usually does a ND MR for breakfast but has been out. She does not live in the area so wanted to know what other breakfast items she can have instead - advised she refer to meal ideas list.   She sometimes gets cravings for sweets, discussed having protein bar options on hand - either ND or one from list which I provided at this visit.  She is making good food choices and it appears some small adjustments to portions will be of great benefit for Nazia.   Recommend reviewing nutrition basics at next visit.    Goals:  1. Cut back on fat servings. Try taking away 1 Tbsp of creamer in your tea (keep decreasing as appropriate) - can add milk instead.  2. 6 walnuts = ~1 fat serving - try to get to this for your yogurt topping  3. Choose a protein bar from the list provided for a good snack option that can satisfy sweet craving. Can always have Non-starchy veggies as well!     Follow-up: 2 weeks

## 2020-05-20 ENCOUNTER — NON-PROVIDER VISIT (OUTPATIENT)
Dept: HEALTH INFORMATION MANAGEMENT | Facility: MEDICAL CENTER | Age: 60
End: 2020-05-20
Payer: COMMERCIAL

## 2020-05-20 DIAGNOSIS — E78.5 DYSLIPIDEMIA: ICD-10-CM

## 2020-05-20 DIAGNOSIS — K52.9 INFLAMMATORY BOWEL DISEASE: ICD-10-CM

## 2020-05-20 DIAGNOSIS — R73.01 ELEVATED FASTING BLOOD SUGAR: ICD-10-CM

## 2020-05-20 DIAGNOSIS — R63.5 WEIGHT GAIN: ICD-10-CM

## 2020-05-20 DIAGNOSIS — E66.01 MORBID OBESITY (HCC): ICD-10-CM

## 2020-05-20 DIAGNOSIS — E03.9 ACQUIRED HYPOTHYROIDISM: ICD-10-CM

## 2020-05-20 DIAGNOSIS — I10 ESSENTIAL HYPERTENSION: ICD-10-CM

## 2020-05-20 DIAGNOSIS — E55.9 VITAMIN D DEFICIENCY: ICD-10-CM

## 2020-05-20 DIAGNOSIS — Z82.49 FAMILY HISTORY OF CORONARY ARTERY DISEASE: ICD-10-CM

## 2020-05-20 PROCEDURE — 97803 MED NUTRITION INDIV SUBSEQ: CPT | Mod: 95,CR | Performed by: DIETITIAN, REGISTERED

## 2020-05-20 NOTE — PROGRESS NOTES
"This encounter was conducted via Zoom .   Verbal consent was obtained. Patient's identity was verified.    Nutrition Reassess: Medical Weight Management   Today's date: 5/20/2020  Referring Provider: No ref. provider found      Time: in/out 9:08 -9:47  Visit#: 3    Subjective:  - has been having 3 meals a day  - will start working on Tuesday   - will have her normal routine   - has sciatica   - has not been implementing much exercise  - has been working on her macro goals as they were higher fat before  - hx of cholecystectomy  - 31% CHO, 31% fat, 38% protein weekly average  - has IBD  - likes leafy greens      Anthropometrics/Objective  Vitals 4/20/2020   WEIGHT 278.9   HEIGHT 5' 8\"   BMI 42.41 kg/m2     Stated weight: -6 lbs from starting weight               Meal Plan:   Calorie controlled, high protein, low carb diet with 1 meal replacements per day    ReAssesment/Notes:    Nazia has been working towards her health goals of weight loss. She has been doing a great job of incorporating all macros into her meals and snacks.She was wary of carbs before but we discussed sustainability of her food practices as well as making sure we get adequate amounts of each group. She has been incorporating great complex carbs into her normal routine and is doing better with her fat macro goals as well. She was high in fat previously (hx of cholecystectomy) but has been working on her fat servings, especially nuts. Feels she is doing better at balancing meals. She uses MR viramontes when she feels she may have to skip. Usually does 1 but past two days has been doing 2 as lunch was going to be skipped otherwise.   She will be starting work next Tuesday and feels this will help her with a normal routine for her meals and exercise too hopefully. She has a track at work that she can walk and will attempt that when she goes back.  At this visit we reviewed the carbs section of nutrition basics. We will finish the fats and protein " section at next visit.    Follow-up: 2-4 weeks

## 2020-05-22 ENCOUNTER — TELEPHONE (OUTPATIENT)
Dept: CARDIOLOGY | Facility: MEDICAL CENTER | Age: 60
End: 2020-05-22

## 2020-05-22 NOTE — TELEPHONE ENCOUNTER
/bernarda Swift from [facility] Scheduling calling for dx code on CT cardiac scoring to be done on 6/4.    Please call Jeniffer [phone].      If you wish, just enter code into Epic, and just call & let Jeniffer know epic has been updated.

## 2020-06-02 ENCOUNTER — NON-PROVIDER VISIT (OUTPATIENT)
Dept: HEALTH INFORMATION MANAGEMENT | Facility: MEDICAL CENTER | Age: 60
End: 2020-06-02
Payer: COMMERCIAL

## 2020-06-02 DIAGNOSIS — Z82.49 FAMILY HISTORY OF CORONARY ARTERY DISEASE: ICD-10-CM

## 2020-06-02 DIAGNOSIS — E66.01 MORBID OBESITY (HCC): ICD-10-CM

## 2020-06-02 DIAGNOSIS — E55.9 VITAMIN D DEFICIENCY: ICD-10-CM

## 2020-06-02 DIAGNOSIS — I10 ESSENTIAL HYPERTENSION: ICD-10-CM

## 2020-06-02 DIAGNOSIS — R73.01 ELEVATED FASTING BLOOD SUGAR: ICD-10-CM

## 2020-06-02 DIAGNOSIS — R63.5 WEIGHT GAIN: ICD-10-CM

## 2020-06-02 DIAGNOSIS — E78.5 DYSLIPIDEMIA: ICD-10-CM

## 2020-06-02 DIAGNOSIS — K52.9 INFLAMMATORY BOWEL DISEASE: ICD-10-CM

## 2020-06-02 DIAGNOSIS — E03.9 ACQUIRED HYPOTHYROIDISM: ICD-10-CM

## 2020-06-02 PROCEDURE — 97803 MED NUTRITION INDIV SUBSEQ: CPT | Mod: 95,CR | Performed by: DIETITIAN, REGISTERED

## 2020-06-02 NOTE — PROGRESS NOTES
This encounter was conducted via Zoom .   Verbal consent was obtained. Patient's identity was verified.    Nutrition Reassess: Medical Weight Management   Today's date: 6/2/2020  Referring Provider: No ref. provider found      Time: in/out 9:00-9:18am  Visit#: 4    Subjective:  - is back at work  - has not been logging as much because of this  - finds being at work has led to more interruptions  - has been using stand up desk at work  - gets short walks between college buildings  - states tracking keeps her more mindful            Meal Plan:   Calorie controlled, high protein, low carb diet with 1 meal replacements per day    ReAssesment/Notes:    Nazia has been working on her health goals towards weight loss. She has started working again which she has found to be difficult to her normal regimen for food and exercise chocies. She has not been able to track as regularly and states she has skipped about five days. She finds that tracking helps her to be more mindful of her choices and helps her identity portions better than when she is not tracking. She would like to work on getting back on track with this as well as increasing her walking goals. Nazia will set timers to walk for at least ten minutes every time.   At this visit we finished the nutrition basics handout. Encouraged Nazia to reach out with any questions.    Follow-up: 4-6 weeks MD/HAYDEN

## 2020-06-04 DIAGNOSIS — R60.0 BILATERAL EDEMA OF LOWER EXTREMITY: ICD-10-CM

## 2020-06-04 RX ORDER — FUROSEMIDE 40 MG/1
TABLET ORAL
Qty: 90 TAB | Refills: 2 | Status: SHIPPED
Start: 2020-06-04 | End: 2020-11-05

## 2020-06-09 DIAGNOSIS — I10 ESSENTIAL HYPERTENSION: ICD-10-CM

## 2020-06-09 RX ORDER — VALSARTAN 160 MG/1
160 TABLET ORAL DAILY
Qty: 90 TAB | Refills: 1 | Status: SHIPPED | OUTPATIENT
Start: 2020-06-09 | End: 2020-11-05 | Stop reason: SDUPTHER

## 2020-06-11 ENCOUNTER — TELEMEDICINE (OUTPATIENT)
Dept: HEALTH INFORMATION MANAGEMENT | Facility: MEDICAL CENTER | Age: 60
End: 2020-06-11
Payer: COMMERCIAL

## 2020-06-11 DIAGNOSIS — I10 ESSENTIAL HYPERTENSION: ICD-10-CM

## 2020-06-11 DIAGNOSIS — E78.5 DYSLIPIDEMIA: ICD-10-CM

## 2020-06-11 DIAGNOSIS — E66.01 MORBID OBESITY (HCC): ICD-10-CM

## 2020-06-11 DIAGNOSIS — E55.9 VITAMIN D DEFICIENCY: ICD-10-CM

## 2020-06-11 PROCEDURE — 99214 OFFICE O/P EST MOD 30 MIN: CPT | Mod: 95,CR | Performed by: INTERNAL MEDICINE

## 2020-06-11 NOTE — PROGRESS NOTES
This encounter was conducted via Zoom .   Verbal consent was obtained. Patient's identity was verified.      Bariatric Medicine Follow Up  Chief Complaint   Patient presents with   • Weight Gain       History of Present Illness:   Nazia Lyle is a 60 y.o. female who presents for weight management follow-up and to help address co-morbidities caused by overweight, as below.    During the patient's last visit, the following were discussed and recommended:  Weight Goal: 5% wt loss at one month after start (pt goal weight is 200 lb)  Diet:     MR: Consider 1 ND every morning instead of skipping  High Protein/Low Carb Meals and 2 snacks between meals daily  >100 g protein, <100 g total carbs daily, less than 1400 kcals per day, start with less than 1600/day  Track daily intake with My Fitness Pal, bring to next visit  64+ oz water per day  Physical Activity: Must increase, set goals next visit  Risk level for moderate/vigorous exercise program:   Low  New Rx: Consider anti-obesity medication pending course  Behavior change:   Must increase her activity level    She is working from home, looking forward to work away from home.  She feels good!  She feels the weight loss program is working well for her, pleased with her progress to date.    Snacking on too many nuts, has been reducing.  Having tea to avoid creamer.  Working with RD.  Eating too quickly.  Having 1 ND MR daily.  Planning meals really helps.  She is tracking with My Fitness Pal, average intake around 1200 kcal/d.  Not feeling too hungry.  Measuring carbs, to reduce but not eliminate.    VDD: Not currently on vitamin D, with last vitamin D level 20, 3/2020  HLD: Continues daily statin  HTN: Blood pressure controlled on for losartan    Exercise:   Standing days with stand up desk  Walk around her work office and home more     Review of Systems   Pt denies lightheadedness, weakness, worsening fatigue, excessive dry mouth, mood changes, paresthesias.  All  other ROS were reviewed and are otherwise unchanged from my previous visit with patient.    Physical Exam:    There were no vitals taken for this visit.   Last weight: 278.5 pounds  Current weight:  270 lb  Weight change since last visit:  -8.5 lb     Constitutional: Oriented to person, place, and time and well-developed, well-nourished, and in no distress.    Head: Normocephalic.   Skin: Not diaphoretic.   Psychiatric: Mood, memory, affect and judgment normal.     Laboratory:   Recent labs reviewed.      Dietitian Assessment: I have reviewed the Dietitian's assessment related to this encounter.   Patient continues to meet with dietitian regularly.    ASSESSMENT/PLAN:  There is no height or weight on file to calculate BMI.    Obesity Stage ([place]): 1; Class 3    1. Morbid obesity (HCC)     2. Vitamin D deficiency     3. Dyslipidemia     4. Essential hypertension       The patient has begun to make great progress towards her weight loss goals.  She is much more mindful about food choices, watching refined carbohydrate intake, tracking well and using meal replacements effectively.  She has begun to increase her activity level; would benefit from increasing more.  Continue to monitor blood pressure, lipids, vitamin D.    The patient and I have discussed at length and agree to the following recommendations, which are all addressing the above diagnoses:    Weight Goal: 3-5% wt loss each month (pt goal weight is 200 lb)  Diet: 1 ND MR, 1 ND bar daily to avoid skipping snack  Tracking with my fitness pal, around 1200 kcals per day, around 100 g total carbohydrate/protein daily  Physical Activity:  Plans to begin using FitBit more or on phone, to track steps  Rx: Consider anti-obesity medication pending course  Follow-up: 1 month    Patient's body mass index is unknown because there is no height or weight on file. Exercise and nutrition counseling were performed at this visit.

## 2020-07-09 RX ORDER — LEVOTHYROXINE SODIUM 112 MCG
TABLET ORAL
Qty: 90 TAB | Refills: 0 | Status: SHIPPED | OUTPATIENT
Start: 2020-07-09 | End: 2020-09-30

## 2020-07-11 ENCOUNTER — HOSPITAL ENCOUNTER (OUTPATIENT)
Dept: RADIOLOGY | Facility: MEDICAL CENTER | Age: 60
End: 2020-07-11
Attending: NURSE PRACTITIONER
Payer: COMMERCIAL

## 2020-07-11 DIAGNOSIS — E78.5 HYPERLIPIDEMIA, UNSPECIFIED HYPERLIPIDEMIA TYPE: ICD-10-CM

## 2020-07-11 DIAGNOSIS — Z82.49 FAMILY HISTORY OF ISCHEMIC HEART DISEASE: ICD-10-CM

## 2020-07-11 PROCEDURE — 4410556 CT-CARDIAC SCORING

## 2020-07-14 ENCOUNTER — TELEPHONE (OUTPATIENT)
Dept: CARDIOLOGY | Facility: MEDICAL CENTER | Age: 60
End: 2020-07-14

## 2020-07-14 RX ORDER — ATORVASTATIN CALCIUM 40 MG/1
40 TABLET, FILM COATED ORAL EVERY EVENING
Qty: 90 TAB | Refills: 3 | Status: SHIPPED | OUTPATIENT
Start: 2020-07-14 | End: 2021-08-16

## 2020-07-14 NOTE — TELEPHONE ENCOUNTER
----- Message from ADA Manuel sent at 7/14/2020  8:59 AM PDT -----  calcium score is elevated, 179.5. increase statin to atorvastatin 40mg qd.     Thanks,   RT

## 2020-07-22 ENCOUNTER — APPOINTMENT (RX ONLY)
Dept: URBAN - METROPOLITAN AREA CLINIC 20 | Facility: CLINIC | Age: 60
Setting detail: DERMATOLOGY
End: 2020-07-22

## 2020-07-22 DIAGNOSIS — D18.0 HEMANGIOMA: ICD-10-CM

## 2020-07-22 DIAGNOSIS — Z85.828 PERSONAL HISTORY OF OTHER MALIGNANT NEOPLASM OF SKIN: ICD-10-CM

## 2020-07-22 DIAGNOSIS — L81.4 OTHER MELANIN HYPERPIGMENTATION: ICD-10-CM

## 2020-07-22 DIAGNOSIS — L71.0 PERIORAL DERMATITIS: ICD-10-CM

## 2020-07-22 DIAGNOSIS — D22 MELANOCYTIC NEVI: ICD-10-CM

## 2020-07-22 DIAGNOSIS — L57.0 ACTINIC KERATOSIS: ICD-10-CM

## 2020-07-22 DIAGNOSIS — L82.0 INFLAMED SEBORRHEIC KERATOSIS: ICD-10-CM

## 2020-07-22 DIAGNOSIS — Z71.89 OTHER SPECIFIED COUNSELING: ICD-10-CM

## 2020-07-22 DIAGNOSIS — L82.1 OTHER SEBORRHEIC KERATOSIS: ICD-10-CM

## 2020-07-22 PROBLEM — D22.61 MELANOCYTIC NEVI OF RIGHT UPPER LIMB, INCLUDING SHOULDER: Status: ACTIVE | Noted: 2020-07-22

## 2020-07-22 PROBLEM — D22.5 MELANOCYTIC NEVI OF TRUNK: Status: ACTIVE | Noted: 2020-07-22

## 2020-07-22 PROBLEM — D22.71 MELANOCYTIC NEVI OF RIGHT LOWER LIMB, INCLUDING HIP: Status: ACTIVE | Noted: 2020-07-22

## 2020-07-22 PROBLEM — D18.01 HEMANGIOMA OF SKIN AND SUBCUTANEOUS TISSUE: Status: ACTIVE | Noted: 2020-07-22

## 2020-07-22 PROBLEM — D22.72 MELANOCYTIC NEVI OF LEFT LOWER LIMB, INCLUDING HIP: Status: ACTIVE | Noted: 2020-07-22

## 2020-07-22 PROBLEM — D22.62 MELANOCYTIC NEVI OF LEFT UPPER LIMB, INCLUDING SHOULDER: Status: ACTIVE | Noted: 2020-07-22

## 2020-07-22 PROBLEM — D48.5 NEOPLASM OF UNCERTAIN BEHAVIOR OF SKIN: Status: ACTIVE | Noted: 2020-07-22

## 2020-07-22 PROCEDURE — ? ADDITIONAL NOTES

## 2020-07-22 PROCEDURE — ? BIOPSY BY SHAVE METHOD

## 2020-07-22 PROCEDURE — 11102 TANGNTL BX SKIN SINGLE LES: CPT | Mod: 59

## 2020-07-22 PROCEDURE — ? COUNSELING

## 2020-07-22 PROCEDURE — ? LIQUID NITROGEN

## 2020-07-22 PROCEDURE — 17003 DESTRUCT PREMALG LES 2-14: CPT | Mod: 59

## 2020-07-22 PROCEDURE — 17110 DESTRUCTION B9 LES UP TO 14: CPT

## 2020-07-22 PROCEDURE — ? PRESCRIPTION

## 2020-07-22 PROCEDURE — 17000 DESTRUCT PREMALG LESION: CPT | Mod: 59

## 2020-07-22 PROCEDURE — 99203 OFFICE O/P NEW LOW 30 MIN: CPT | Mod: 25

## 2020-07-22 RX ORDER — METRONIDAZOLE 10 MG/G
GEL TOPICAL
Qty: 1 | Refills: 3 | Status: ERX | COMMUNITY
Start: 2020-07-22

## 2020-07-22 RX ADMIN — METRONIDAZOLE 1: 10 GEL TOPICAL at 00:00

## 2020-07-22 ASSESSMENT — LOCATION DETAILED DESCRIPTION DERM
LOCATION DETAILED: RIGHT PROXIMAL DORSAL FOREARM
LOCATION DETAILED: RIGHT POSTERIOR ANKLE
LOCATION DETAILED: LEFT LATERAL DISTAL CALF
LOCATION DETAILED: LEFT PROXIMAL POSTERIOR THIGH
LOCATION DETAILED: LEFT VENTRAL PROXIMAL FOREARM
LOCATION DETAILED: RIGHT DISTAL POSTERIOR THIGH
LOCATION DETAILED: LEFT SUPERIOR MEDIAL MIDBACK
LOCATION DETAILED: LEFT VENTRAL DISTAL FOREARM
LOCATION DETAILED: EPIGASTRIC SKIN
LOCATION DETAILED: LEFT INFERIOR CENTRAL MALAR CHEEK
LOCATION DETAILED: RIGHT VENTRAL PROXIMAL FOREARM
LOCATION DETAILED: LEFT SUPERIOR MEDIAL BUCCAL CHEEK
LOCATION DETAILED: LEFT ANTERIOR DISTAL UPPER ARM
LOCATION DETAILED: RIGHT PROXIMAL PRETIBIAL REGION
LOCATION DETAILED: LEFT PROXIMAL PRETIBIAL REGION
LOCATION DETAILED: RIGHT PROXIMAL POSTERIOR THIGH
LOCATION DETAILED: LEFT SUPERIOR MEDIAL UPPER BACK
LOCATION DETAILED: RIGHT SUPERIOR MEDIAL BUCCAL CHEEK
LOCATION DETAILED: LEFT LATERAL PROXIMAL CALF
LOCATION DETAILED: RIGHT ANTERIOR DISTAL UPPER ARM
LOCATION DETAILED: LEFT DISTAL POSTERIOR THIGH
LOCATION DETAILED: LEFT ANTERIOR DISTAL THIGH
LOCATION DETAILED: LEFT PROXIMAL DORSAL FOREARM
LOCATION DETAILED: LEFT MID-UPPER BACK
LOCATION DETAILED: RIGHT VENTRAL DISTAL FOREARM
LOCATION DETAILED: RIGHT ANTERIOR DISTAL THIGH
LOCATION DETAILED: RIGHT INFERIOR MEDIAL UPPER BACK
LOCATION DETAILED: RIGHT DISTAL PRETIBIAL REGION

## 2020-07-22 ASSESSMENT — LOCATION SIMPLE DESCRIPTION DERM
LOCATION SIMPLE: RIGHT UPPER BACK
LOCATION SIMPLE: RIGHT POSTERIOR THIGH
LOCATION SIMPLE: RIGHT CHEEK
LOCATION SIMPLE: LEFT PRETIBIAL REGION
LOCATION SIMPLE: LEFT POSTERIOR THIGH
LOCATION SIMPLE: LEFT UPPER BACK
LOCATION SIMPLE: LEFT FOREARM
LOCATION SIMPLE: LEFT UPPER ARM
LOCATION SIMPLE: LEFT LOWER BACK
LOCATION SIMPLE: ABDOMEN
LOCATION SIMPLE: LEFT THIGH
LOCATION SIMPLE: RIGHT ANKLE
LOCATION SIMPLE: LEFT CHEEK
LOCATION SIMPLE: RIGHT FOREARM
LOCATION SIMPLE: LEFT LOWER LEG
LOCATION SIMPLE: RIGHT THIGH
LOCATION SIMPLE: RIGHT PRETIBIAL REGION
LOCATION SIMPLE: RIGHT UPPER ARM

## 2020-07-22 ASSESSMENT — LOCATION ZONE DERM
LOCATION ZONE: LEG
LOCATION ZONE: ARM
LOCATION ZONE: FACE
LOCATION ZONE: TRUNK

## 2020-07-22 NOTE — PROCEDURE: BIOPSY BY SHAVE METHOD

## 2020-08-19 ENCOUNTER — OFFICE VISIT (OUTPATIENT)
Dept: HEALTH INFORMATION MANAGEMENT | Facility: MEDICAL CENTER | Age: 60
End: 2020-08-19
Payer: COMMERCIAL

## 2020-08-19 VITALS
SYSTOLIC BLOOD PRESSURE: 130 MMHG | HEIGHT: 68 IN | DIASTOLIC BLOOD PRESSURE: 80 MMHG | WEIGHT: 269.1 LBS | BODY MASS INDEX: 40.78 KG/M2 | HEART RATE: 70 BPM | OXYGEN SATURATION: 96 %

## 2020-08-19 DIAGNOSIS — R73.01 ELEVATED FASTING BLOOD SUGAR: ICD-10-CM

## 2020-08-19 DIAGNOSIS — Z71.3 DIETARY COUNSELING AND SURVEILLANCE: ICD-10-CM

## 2020-08-19 DIAGNOSIS — E66.01 MORBID OBESITY (HCC): ICD-10-CM

## 2020-08-19 DIAGNOSIS — I10 ESSENTIAL HYPERTENSION: ICD-10-CM

## 2020-08-19 DIAGNOSIS — E88.810 METABOLIC SYNDROME: ICD-10-CM

## 2020-08-19 DIAGNOSIS — E78.5 DYSLIPIDEMIA: ICD-10-CM

## 2020-08-19 DIAGNOSIS — E55.9 VITAMIN D DEFICIENCY: ICD-10-CM

## 2020-08-19 PROCEDURE — 99213 OFFICE O/P EST LOW 20 MIN: CPT | Mod: 25 | Performed by: INTERNAL MEDICINE

## 2020-08-19 PROCEDURE — 99401 PREV MED CNSL INDIV APPRX 15: CPT | Performed by: INTERNAL MEDICINE

## 2020-08-19 NOTE — PROGRESS NOTES
"Bariatric Medicine Follow Up  Chief Complaint   Patient presents with   • Weight Gain       History of Present Illness:   Nazia Lyle is a 60 y.o. female who presents for follow-up to intensive behavioral modification of overweight and to help address co-morbidities caused by overweight, as below.    Obesity Stage/Class: 1/3  During the patient's last visit, the following were discussed and recommended:  Weight Goal: 3-5% wt loss each month (pt goal weight is 200 lb)  Diet: 1 ND MR, 1 ND bar daily to avoid skipping snack  Tracking with my fitness pal, around 1200 kcals per day, around 100 g total carbohydrate/protein daily  Physical Activity:  Plans to begin using FitBit more or on phone, to track steps  Rx: Consider anti-obesity medication pending course      Challenges: Sweet cravings, not enough exercise  Dietary adherence:  Good  Tracking daily, staying with macro balance most days  ND MR am keeps her full  1/2 ND bar controls her sweet cravings   Not drinking much water  Exercise:  Walking more, [place] far away from her office    AntiObesity Medication use: None  Medication efficacy/tolerance/side effects: N/A  Medication compliance: N/A    Status of comorbid conditions/other medical diagnoses:  I FG: Controlled, not on glucose lowering agent  HLD: Controlled on statin, omega-3 fatty acid  VDD: Controlled, not on vitamin D supplement       Review of Systems   Pt denies lightheadedness, weakness, worsening fatigue, excessive dry mouth, mood changes, paresthesias.  All other ROS were reviewed and are otherwise unchanged from my previous visit with patient.    Physical Exam:    /80 (BP Location: Left arm, Patient Position: Sitting, BP Cuff Size: Large adult)   Pulse 70   Ht 1.727 m (5' 8\")   Wt 122.1 kg (269 lb 1.6 oz)   SpO2 96%   BMI 40.92 kg/m²   Waist Measurement   Waist: 48.25 inch/inches  Weight change since last visit: -10 lb   Waist Circum change since last visit: -0.25 in "     Constitutional: Oriented to person, place, and time and well-developed, well-nourished, and in no distress.    Head: Normocephalic.   Musculoskeletal: Normal range of motion. No edema.   Neurological: Alert and oriented to person, place, and time. No muscle weakness.  Gait normal.   Skin: Warm and dry. Not diaphoretic.   Psychiatric: Mood, memory, affect and judgment normal.     Laboratory:   Recent labs reviewed.      Dietitian Assessment: I have reviewed the Dietitian's assessment related to this encounter.     ASSESSMENT  60 y.o.  female presents for intensive lifestyle intervention for treatment of obesity and co-morbid conditions.  Obesity Stage ([place]) 1; Class 3    1. Elevated fasting blood sugar     2. Dyslipidemia     3. Essential hypertension     4. Vitamin D deficiency     5. Metabolic syndrome     6. Morbid obesity (HCC)         The patient has made some very positive lifestyle change, realizes her weight loss will be slow but if it is steady she feels motivated to continue.  Recommend continuing stimulus narrowing, may need to intensify for better weight loss results.  Track intake.  Increase activity as she is working on.  Consider adding anti-obesity medication pending course.  Continue to monitor fasting glucose, lipids, vitamin D and blood pressure.    PLAN  Weight Goal: 5% wt loss at one month after start (pt goal weight is 200 lb)  Dietary intervention:    MR:  1 ND MR and bar daily  Consider VLCD pending course  High Protein/Low Carb whole food meals and 2 snacks between meals daily  >100 g protein, <100 g total carbs daily, 1200 kcal/d   Track daily intake with My Fitness Pal, bring to next visit  64+ oz water per day  Physical Activity:  Walking 20 min daily is goal  Labs: N/A  Rx changes:   Consider antiobesity med pending course  Behavior modification:   Increase activity  Sitter more intensified stimulus narrowing  Surgical considerations: Consider referral to bariatric surgery if  unsuccessful with medical weight loss  Follow-up: one month with MD, weekly to biweekly for preventive obesity counseling    Patient's body mass index is 40.92 kg/m². Exercise and nutrition counseling were performed at this visit.        >>>>>>>>>>>>>      PREVENTIVE SERVICES COUNSELING FOR OBESITY NOTE    O:  Body mass index is 40.92 kg/m²., see also vitals flowsheet for today  Pt struggling with:  Stress management  Cravings  Keeping food diary/tracking consumption  Meal planning  Meal prep  Taking medications as prescribed  A:  Dietary counseling and surveillance for obesity  Z71.3, E66.9, BMI Code: Z68.41  P:  Counseled pt on reduced kcal, reduced refined CHO whole food meal plan  Advised exercise: Walking  Discussed strategies of self-monitoring with:   food diary  cognitive restructuring  stress reduction  stimulus control  Meal pre-planning  Environmental management  Time: 15 min

## 2020-09-24 ENCOUNTER — TELEMEDICINE (OUTPATIENT)
Dept: HEALTH INFORMATION MANAGEMENT | Facility: MEDICAL CENTER | Age: 60
End: 2020-09-24
Payer: COMMERCIAL

## 2020-09-24 VITALS — BODY MASS INDEX: 40.47 KG/M2 | WEIGHT: 267 LBS | HEIGHT: 68 IN

## 2020-09-24 DIAGNOSIS — E78.5 DYSLIPIDEMIA: ICD-10-CM

## 2020-09-24 DIAGNOSIS — R73.01 ELEVATED FASTING BLOOD SUGAR: ICD-10-CM

## 2020-09-24 DIAGNOSIS — I10 ESSENTIAL HYPERTENSION: ICD-10-CM

## 2020-09-24 DIAGNOSIS — E55.9 VITAMIN D DEFICIENCY: ICD-10-CM

## 2020-09-24 DIAGNOSIS — E88.810 METABOLIC SYNDROME: ICD-10-CM

## 2020-09-24 DIAGNOSIS — E66.01 MORBID OBESITY (HCC): ICD-10-CM

## 2020-09-24 PROCEDURE — 99213 OFFICE O/P EST LOW 20 MIN: CPT | Mod: 95,CR | Performed by: INTERNAL MEDICINE

## 2020-09-24 NOTE — PROGRESS NOTES
This evaluation was conducted via Zoom using secure and encrypted videoconferencing technology. The patient was in a private location in the state [facility].    The patient's identity was confirmed and verbal consent was obtained for this virtual visit.      Bariatric Medicine Follow Up  Chief Complaint   Patient presents with   • Weight Gain       History of Present Illness:   Nazia Lyle is a 60 y.o. female who presents for follow-up to intensive behavioral modification of overweight and to help address co-morbidities caused by overweight, as below.    Obesity Stage/Class: 1/3  During the patient's last visit, the following were discussed and recommended:  Weight Goal: 5% wt loss at one month after start (pt goal weight is 200 lb)  Dietary intervention:    MR:  1 ND MR and bar daily  Consider VLCD pending course  High Protein/Low Carb whole food meals and 2 snacks between meals daily  >100 g protein, <100 g total carbs daily, 1200 kcal/d   Track daily intake with My Fitness Pal, bring to next visit  64+ oz water per day  Physical Activity:  Walking 20 min daily is goal  Labs: N/A  Rx changes:   Consider antiobesity med pending course  Behavior modification:   Increase activity  Consider more intensified stimulus narrowing  Surgical considerations: Consider referral to bariatric surgery if unsuccessful with medical weight loss      Challenges:  Sciatica, knee pain  Dietary adherence:  Good  Snacking on almond butter, has 1 ND MR in am  Satisfied with food, not overly hungry  Still not drinking enough water  Exercise:  Walking, video for walking    AntiObesity Medication use: None  Medication efficacy/tolerance/side effects: N/A  Medication compliance: N/A    Status of comorbid conditions/other medical diagnoses:  IFG: Controlled?  Not on glucose lowering agent, no recent labs  HLD: Controlled?  On statin, no recent labs  HTN: Has been controlled with losartan, furosemide  VDD: Controlled?  Not on vitamin D  "supplement       Review of Systems   Pt denies lightheadedness, weakness, worsening fatigue, excessive dry mouth, mood changes, paresthesias.  All other ROS were reviewed and are otherwise unchanged from my previous visit with patient.    Physical Exam:    Ht 1.727 m (5' 8\")   Wt 121.1 kg (267 lb)   BMI 40.60 kg/m²       Last wt:  269 lb  Weight change since last visit:  -2 lb    Constitutional: Oriented to person, place, and time and well-developed, well-nourished, and in no distress.    Head: Normocephalic.   Skin: Not diaphoretic.   Psychiatric: Mood, memory, affect and judgment normal.     Laboratory:   Recent labs reviewed.      Dietitian Assessment: I have reviewed the Dietitian's assessment related to this encounter.     ASSESSMENT  60 y.o.  female presents for intensive lifestyle intervention for treatment of obesity and co-morbid conditions.  Obesity Stage ([place])/Class 1/3    1. Dyslipidemia     2. Essential hypertension     3. Elevated fasting blood sugar     4. Vitamin D deficiency     5. Metabolic syndrome     6. Morbid obesity (HCC)         Patient has begun to make some positive lifestyle change, but has had difficulty with ambulation, so less exercise.  Recommend she continue with meal replacement therapy, consider VLCD but for now tracking, more mindful food choices and CHO reduction.  We will continue to monitor lipids, blood pressure, fasting glucose, vitamin D, as she progresses through the program.    PLAN  Weight Goal: 200 lb  Dietary intervention:    MR: Everardo ND shake plus bar daily  High Protein/Low Carb whole food meals and 2 snacks between meals daily  >100 g protein, <100 g total carbs daily, 1200 kcal/d   Track daily intake with My Fitness Pal, bring to next visit  64+ oz water per day  Physical Activity: Walking via home videos  Labs: Update lipid profile, CMP, vitamin D at next visit  Rx changes:   Consider AOM  Behavior modification:   Consider more intensified stimulus narrowing " VLCD  Surgical considerations: Consider referral to bariatric surgery if unsuccessful with medical weight loss  Follow-up: one month with MD    Patient's body mass index is 40.6 kg/m². Exercise and nutrition counseling were performed at this visit.

## 2020-09-30 DIAGNOSIS — E03.9 ACQUIRED HYPOTHYROIDISM: ICD-10-CM

## 2020-09-30 RX ORDER — LEVOTHYROXINE SODIUM 112 MCG
TABLET ORAL
Qty: 90 TAB | Refills: 0 | Status: SHIPPED | OUTPATIENT
Start: 2020-09-30 | End: 2020-12-22

## 2020-10-21 ENCOUNTER — APPOINTMENT (RX ONLY)
Dept: URBAN - METROPOLITAN AREA CLINIC 20 | Facility: CLINIC | Age: 60
Setting detail: DERMATOLOGY
End: 2020-10-21

## 2020-10-21 DIAGNOSIS — L71.0 PERIORAL DERMATITIS: ICD-10-CM | Status: RESOLVED

## 2020-10-21 DIAGNOSIS — L98.8 OTHER SPECIFIED DISORDERS OF THE SKIN AND SUBCUTANEOUS TISSUE: ICD-10-CM

## 2020-10-21 PROCEDURE — 99213 OFFICE O/P EST LOW 20 MIN: CPT

## 2020-10-21 PROCEDURE — ? COUNSELING

## 2020-10-21 PROCEDURE — ? ADDITIONAL NOTES

## 2020-10-21 ASSESSMENT — LOCATION SIMPLE DESCRIPTION DERM
LOCATION SIMPLE: RIGHT CHEEK
LOCATION SIMPLE: LEFT CHEEK

## 2020-10-21 ASSESSMENT — LOCATION DETAILED DESCRIPTION DERM
LOCATION DETAILED: RIGHT SUPERIOR MEDIAL BUCCAL CHEEK
LOCATION DETAILED: LEFT SUPERIOR MEDIAL BUCCAL CHEEK
LOCATION DETAILED: LEFT SUPERIOR CENTRAL BUCCAL CHEEK

## 2020-10-21 ASSESSMENT — LOCATION ZONE DERM: LOCATION ZONE: FACE

## 2020-10-21 NOTE — PROCEDURE: ADDITIONAL NOTES
Detail Level: Simple
Additional Notes: Continue gentle skin care\\nStopped metronidazole 1 mo ago.\\nOkay to restart trial of her tretinoin in her curology
Additional Notes: Patient interested in treating fine lines in the future. \\nRecommended patient to work in tretinoin cream couple nights a week if she can tolerate it.\\nWent over peels, patient was interested in. Recommended fraxel can help with wrinkles. \\nPatient declines Botox.\\nRecommended to make a cosmetic consultation appointment.

## 2020-10-28 ENCOUNTER — OFFICE VISIT (OUTPATIENT)
Dept: HEALTH INFORMATION MANAGEMENT | Facility: MEDICAL CENTER | Age: 60
End: 2020-10-28
Payer: COMMERCIAL

## 2020-10-28 VITALS
OXYGEN SATURATION: 96 % | BODY MASS INDEX: 39.8 KG/M2 | DIASTOLIC BLOOD PRESSURE: 82 MMHG | HEIGHT: 68 IN | WEIGHT: 262.6 LBS | HEART RATE: 76 BPM | SYSTOLIC BLOOD PRESSURE: 124 MMHG

## 2020-10-28 DIAGNOSIS — E78.5 DYSLIPIDEMIA: ICD-10-CM

## 2020-10-28 DIAGNOSIS — E88.810 METABOLIC SYNDROME: ICD-10-CM

## 2020-10-28 DIAGNOSIS — E55.9 VITAMIN D DEFICIENCY: ICD-10-CM

## 2020-10-28 DIAGNOSIS — I10 ESSENTIAL HYPERTENSION: ICD-10-CM

## 2020-10-28 DIAGNOSIS — E66.01 MORBID OBESITY (HCC): ICD-10-CM

## 2020-10-28 DIAGNOSIS — R73.01 ELEVATED FASTING BLOOD SUGAR: ICD-10-CM

## 2020-10-28 DIAGNOSIS — Z71.3 DIETARY COUNSELING AND SURVEILLANCE: ICD-10-CM

## 2020-10-28 PROCEDURE — 99401 PREV MED CNSL INDIV APPRX 15: CPT | Performed by: INTERNAL MEDICINE

## 2020-10-28 PROCEDURE — 99213 OFFICE O/P EST LOW 20 MIN: CPT | Mod: 25 | Performed by: INTERNAL MEDICINE

## 2020-10-28 ASSESSMENT — PATIENT HEALTH QUESTIONNAIRE - PHQ9: CLINICAL INTERPRETATION OF PHQ2 SCORE: 0

## 2020-10-28 NOTE — PROGRESS NOTES
"Bariatric Medicine Follow Up  Chief Complaint   Patient presents with   • Weight Gain       History of Present Illness:   Nazia Lyle is a 60 y.o. female who presents for follow-up to intensive behavioral modification of overweight and to help address co-morbidities caused by overweight, as below.    Obesity Stage/Class: 1/3  During the patient's last visit, the following were discussed and recommended:  Weight Goal: 200 lb  Dietary intervention:    MR: 1 ND shake plus bar daily  High Protein/Low Carb whole food meals and 2 snacks between meals daily  >100 g protein, <100 g total carbs daily, 1200 kcal/d   Track daily intake with My Fitness Pal, bring to next visit  64+ oz water per day  Physical Activity: Walking via home videos  Labs: Update lipid profile, CMP, vitamin D at next visit  Rx changes:   Consider AOM  Behavior modification:   Consider more intensified stimulus narrowing VLCD  Surgical considerations: Consider referral to bariatric surgery if unsuccessful with medical weight loss    ___    Challenges:  Not always tracking  Dietary adherence:  Overall good  Not always tracking   Eating same B, L  Not drinking a lot of water  Having 1 ND MR daily  Exercise:  Walking video 5 d/wk    AntiObesity Medication use: Consider AOM  Medication efficacy/tolerance/side effects: n/a  Medication compliance: n/a    Status of comorbid conditions/other medical diagnoses:  HLD: Controlled on statin?  No recent labs  HTN: Controlled on valsartan, Lasix  IFG: Controlled?, not on glucose lowering agent, no recent labs  VDD: Controlled without supplement?  No recent labs       Review of Systems   Pt denies lightheadedness, worsening fatigue, hypoglycemia.  All other ROS were reviewed and are otherwise unchanged from my previous visit with patient.    Physical Exam:    /82 (BP Location: Left arm, Patient Position: Sitting, BP Cuff Size: Large adult)   Pulse 76   Ht 1.727 m (5' 8\")   Wt 119.1 kg (262 lb 9.6 oz) "   SpO2 96%   BMI 39.93 kg/m²   Waist Measurement   Waist: 48.25 inch/inches  Weight change since last visit: -6.5 lb (- 16.3 lb total)  Waist Circum change since last visit: 0 in (- 0.25 in total)    Constitutional: Oriented to person, place, and time and well-developed, well-nourished, and in no distress.    Head: Normocephalic.   Musculoskeletal: Normal range of motion. No edema.   Neurological: Alert and oriented to person, place, and time. No muscle weakness.  Gait normal.   Skin: Warm and dry. Not diaphoretic.   Psychiatric: Mood, memory, affect and judgment normal.     Laboratory:   Patient forgot to get labs drawn.      Dietitian Assessment: n/a    ASSESSMENT  60 y.o.  female presents for intensive lifestyle intervention for treatment of obesity and co-morbid conditions.  Obesity Stage ([place])/Class: 1/3    1. Dyslipidemia     2. Essential hypertension     3. Elevated fasting blood sugar     4. Vitamin D deficiency     5. Metabolic syndrome     6. Morbid obesity (HCC)         Obesity, uncontrolled but improving, with stimulus narrowing, increasing activity level.  Consider AOM pending course.  Blood pressure, fasting glucose, vitamin D and lipid profile control should improve with weight loss but needs updated labs.    PLAN  Weight Goal: 200 or less lb  Dietary intervention:    MR:  1 ND daily qam, some days 1/2 bar in afternoon  Salad for L  High Protein/Low Carb whole food meals and 2 snacks between meals daily  >100 g protein, <100 g total carbs daily, 1200 kcal/d   Track daily intake with My Fitness Pal, bring to next visit  64+ oz water per day  Physical Activity:  Walking video 5 d/wk  Labs:  As previously ordered CMP, lipids  Rx changes:   Consider AOM  Behavior modification:   As above  Surgical considerations: Consider referral to bariatric surgery if unsuccessful with medical weight loss  Follow-up: one month with MD    Patient's body mass index is 39.93 kg/m². Exercise and nutrition counseling  were performed at this visit.        >>>>>>>>>>>>>      PREVENTIVE SERVICES COUNSELING FOR OBESITY NOTE    O:  Body mass index is 39.93 kg/m²., see also vitals flowsheet for today  Pt struggling with:  Stress management  Cravings  Keeping food diary/tracking consumption  Meal planning  Meal prep  A:  Dietary counseling and surveillance for obesity  Z71.3, E66.9, BMI Code: Z68.  39  P:  Counseled pt on reduced kcal, reduced refined CHO whole food meal plan  Advised exercise: Walking  Discussed strategies of self-monitoring with:   food diary  cognitive restructuring  stress reduction  stimulus control  Meal pre-planning  Environmental management  Time: 15 min

## 2020-11-03 ENCOUNTER — TELEPHONE (OUTPATIENT)
Dept: CARDIOLOGY | Facility: MEDICAL CENTER | Age: 60
End: 2020-11-03

## 2020-11-03 NOTE — TELEPHONE ENCOUNTER
Chart Prep      LVM for patient regarding fasting labs for Lipid and CMP needing to be completed before [facility] 11/11. Left call back number.

## 2020-11-04 ENCOUNTER — TELEPHONE (OUTPATIENT)
Dept: CARDIOLOGY | Facility: MEDICAL CENTER | Age: 60
End: 2020-11-04

## 2020-11-04 NOTE — TELEPHONE ENCOUNTER
RT    Pt calling to let us know that she went to the [facility] in [place], [place] for Highblood pressure and Dr Severino the on call dr, gave her a new Rx to take in addition to her Rx valsartan (DIOVAN) 160 MG Tab, she has now. She just wants to make sure this is okay to take in the meantime before she comes in next week to see . RT 11/11  This morning her blood pressure was at 154/200   Please call pt back at [phone]    Thank you

## 2020-11-05 ENCOUNTER — OFFICE VISIT (OUTPATIENT)
Dept: CARDIOLOGY | Facility: MEDICAL CENTER | Age: 60
End: 2020-11-05
Payer: COMMERCIAL

## 2020-11-05 ENCOUNTER — TELEPHONE (OUTPATIENT)
Dept: CARDIOLOGY | Facility: MEDICAL CENTER | Age: 60
End: 2020-11-05

## 2020-11-05 VITALS
DIASTOLIC BLOOD PRESSURE: 102 MMHG | OXYGEN SATURATION: 96 % | HEART RATE: 78 BPM | BODY MASS INDEX: 39.4 KG/M2 | HEIGHT: 68 IN | WEIGHT: 260 LBS | SYSTOLIC BLOOD PRESSURE: 192 MMHG

## 2020-11-05 DIAGNOSIS — I51.7 LVH (LEFT VENTRICULAR HYPERTROPHY): ICD-10-CM

## 2020-11-05 DIAGNOSIS — E78.5 DYSLIPIDEMIA: ICD-10-CM

## 2020-11-05 DIAGNOSIS — I10 ESSENTIAL HYPERTENSION: ICD-10-CM

## 2020-11-05 DIAGNOSIS — I16.0 HYPERTENSIVE URGENCY: ICD-10-CM

## 2020-11-05 DIAGNOSIS — Z79.899 HIGH RISK MEDICATION USE: ICD-10-CM

## 2020-11-05 PROCEDURE — 99215 OFFICE O/P EST HI 40 MIN: CPT | Performed by: INTERNAL MEDICINE

## 2020-11-05 RX ORDER — HYDROCHLOROTHIAZIDE 25 MG/1
25 TABLET ORAL DAILY
COMMUNITY
End: 2020-11-05

## 2020-11-05 RX ORDER — CHLORTHALIDONE 25 MG/1
25 TABLET ORAL DAILY
Qty: 30 TAB | Refills: 5 | Status: SHIPPED | OUTPATIENT
Start: 2020-11-05 | End: 2021-04-26

## 2020-11-05 RX ORDER — VALSARTAN 320 MG/1
320 TABLET ORAL DAILY
Qty: 90 TAB | Refills: 3 | Status: SHIPPED | OUTPATIENT
Start: 2020-11-05 | End: 2021-11-01

## 2020-11-05 RX ORDER — AMLODIPINE BESYLATE 5 MG/1
5 TABLET ORAL DAILY
Qty: 30 TAB | Refills: 5 | Status: SHIPPED | OUTPATIENT
Start: 2020-11-05 | End: 2021-04-26

## 2020-11-05 ASSESSMENT — ENCOUNTER SYMPTOMS
HEADACHES: 1
DOUBLE VISION: 0
VOMITING: 0
DIAPHORESIS: 0
BACK PAIN: 0
PARESTHESIAS: 0
DIZZINESS: 0
MYALGIAS: 0
BLURRED VISION: 0
EXCESSIVE DAYTIME SLEEPINESS: 0
NUMBNESS: 0
PND: 0
NIGHT SWEATS: 0
DIARRHEA: 0
NAUSEA: 0
CONSTIPATION: 0
LOSS OF BALANCE: 0
SHORTNESS OF BREATH: 0
FLANK PAIN: 0
ORTHOPNEA: 0
FEVER: 0
LIGHT-HEADEDNESS: 0
BLOATING: 0
NEAR-SYNCOPE: 0
FALLS: 0
PALPITATIONS: 0
DECREASED APPETITE: 0
IRREGULAR HEARTBEAT: 0
SORE THROAT: 0
COUGH: 0
WEAKNESS: 0
WHEEZING: 0
SLEEP DISTURBANCES DUE TO BREATHING: 0
DYSPNEA ON EXERTION: 0
SYNCOPE: 0
ABDOMINAL PAIN: 0

## 2020-11-05 NOTE — PROGRESS NOTES
Cardiology Follow-up Consultation Note    Date of note:    11/5/2020    Primary Care Provider: eTe Cedeño M.D.    Name:             Nazia Lyle   YOB: 1960  MRN:               [id number]    Chief Complaint   Patient presents with   • HTN (Controlled)     follow up       HISTORY OF PRESENT ILLNESS  Ms. Nazia Lyle is a 60 y.o. female who returns to see us for follow-up of hypertension.    Last clinic visit: 3/17/2020 with CARISSA Manuel.    Patient is new to me.    Interim History:  Patient went to [facility] on 11/3/2020 with high blood pressure.  She reported previous elevated blood pressure especially with sexual activity with readings of 198/134.  Upon presentation to the ED, her blood pressure was noted to be 228/101.  She received one-time dose of labetalol 10 mg IV push with improvement in her blood pressure.  She was then discharged home with discontinuation of Lasix and initiation of hydrochlorothiazide 25 mg daily along with Diovan 160 mg daily.    States that she woke up this morning measured her BP which was elevated at 170/106 with repeat 143/99.  After taking her medications this morning, repeat /102.    Review of Systems   Constitution: Negative for decreased appetite, diaphoresis, fever, malaise/fatigue and night sweats.   HENT: Negative for congestion and sore throat.    Eyes: Negative for blurred vision and double vision.   Cardiovascular: Negative for chest pain, cyanosis, dyspnea on exertion, irregular heartbeat, leg swelling, near-syncope, orthopnea, palpitations, paroxysmal nocturnal dyspnea and syncope.   Respiratory: Negative for cough, shortness of breath, sleep disturbances due to breathing and wheezing.    Endocrine: Negative for cold intolerance and heat intolerance.   Musculoskeletal: Negative for back pain, falls and myalgias.   Gastrointestinal: Negative for bloating, abdominal pain, constipation, diarrhea, nausea  and vomiting.   Genitourinary: Negative for dysuria and flank pain.   Neurological: Positive for headaches. Negative for excessive daytime sleepiness, dizziness, light-headedness, loss of balance, numbness, paresthesias and weakness.       Past Medical History:   Diagnosis Date   • Dyslipidemia 9/17/2012   • History of kidney stones    • HTN (hypertension) 9/17/2012   • Hypothyroid    • Obesity 9/17/2012         Past Surgical History:   Procedure Laterality Date   • HARDWARE REMOVAL UPPER EXTREMITY Left 8/11/2015    Procedure: HARDWARE REMOVAL UPPER EXTREMITY;  Surgeon: Chaka Mckeon M.D.;  Location: SURGERY [facility];  Service:    • APPENDECTOMY     • CHOLECYSTECTOMY     • PRIMARY C SECTION     • TONSILLECTOMY     • TONSILLECTOMY           Current Outpatient Medications   Medication Sig Dispense Refill   • VITAMIN D, CHOLECALCIFEROL, PO Take  by mouth.     • chlorthalidone (HYGROTON) 25 MG Tab Take 1 Tab by mouth every day. 30 Tab 5   • valsartan (DIOVAN) 320 MG tablet Take 1 Tab by mouth every day. 90 Tab 3   • amLODIPine (NORVASC) 5 MG Tab Take 1 Tab by mouth every day. 30 Tab 5   • SYNTHROID 112 MCG Tab TAKE ONE TABLET BY MOUTH EVERY MORNING ON AN EMPTY STOMACH 90 Tab 0   • atorvastatin (LIPITOR) 40 MG Tab Take 1 Tab by mouth every evening. 90 Tab 3   • omega-3 acid ethyl esters (LOVAZA) 1 GM capsule Take 2 Caps by mouth 2 Times a Day. 360 Cap 3   • Multiple Vitamins-Minerals (MULTI COMPLETE PO) Take  by mouth.     • B Complex Vitamins (VITAMIN B COMPLEX PO) Take  by mouth.     • Probiotic Product (SOLUBLE FIBER/PROBIOTICS PO) Take  by mouth.       No current facility-administered medications for this visit.          Allergies   Allergen Reactions   • Latex Rash     rash   • Nkda [No Known Drug Allergy]          Family History   Problem Relation Age of Onset   • Thyroid Mother    • Diabetes Father    • Hypertension Father    • Diabetes Maternal Grandmother    • Diabetes Maternal Grandfather    • Heart  "Disease Paternal Grandmother    • Cancer Paternal Grandfather          Social History     Socioeconomic History   • Marital status:      Spouse name: Not on file   • Number of children: Not on file   • Years of education: Not on file   • Highest education level: Not on file   Occupational History   • Not on file   Social Needs   • Financial resource strain: Not on file   • Food insecurity     Worry: Not on file     Inability: Not on file   • Transportation needs     Medical: Not on file     Non-medical: Not on file   Tobacco Use   • Smoking status: Never Smoker   • Smokeless tobacco: Never Used   Substance and Sexual Activity   • Alcohol use: No   • Drug use: No     Comment: , 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, works as human    • Sexual activity: Not on file   Lifestyle   • Physical activity     Days per week: Not on file     Minutes per session: Not on file   • Stress: Not on file   Relationships   • Social connections     Talks on phone: Not on file     Gets together: Not on file     Attends Cheondo[employer] service: Not on file     Active member of club or organization: Not on file     Attends meetings of clubs or organizations: Not on file     Relationship status: Not on file   • Intimate partner violence     Fear of current or ex partner: Not on file     Emotionally abused: Not on file     Physically abused: Not on file     Forced sexual activity: Not on file   Other Topics Concern   • Not on file   Social History Narrative   • Not on file         Physical Exam:  Ambulatory Vitals  BP (!) 192/102 (BP Location: Left arm, Patient Position: Sitting)   Pulse 78   Ht 1.727 m (5' 8\")   Wt 117.9 kg (260 lb)   SpO2 96%    Oxygen Therapy:  Pulse Oximetry: 96 %  BP Readings from Last 4 Encounters:   11/05/20 (!) 192/102   10/28/20 124/82   08/19/20 130/80   04/20/20 128/82       Weight/BMI: Body mass index is 39.53 kg/m².  Wt Readings from Last 4 Encounters:   11/05/20 117.9 kg (260 lb)  "   10/28/20 119.1 kg (262 lb 9.6 oz)   09/24/20 121.1 kg (267 lb)   08/19/20 122.1 kg (269 lb 1.6 oz)       GEN: Well developed, well nourished and in no acute distress.  HEART: no significant JVD, regular rate and rhythm, normal S1 and S2, no murmurs, no third heart sounds, normal cardiac palpation  LUNG: clear to auscultation bilaterally, no wheezing, no crackles, normal respiratory effort on room air  ABDOMEN: soft, non-tender, non-distended, normal bowel sounds throughout  EXTREMITIES: no peripheral edema noted  VASCULAR: no significantly elevated jugular venous pressure, no carotid bruits noted, radial pulses 2+ and equal      Outside Lab Data Review:  Sodium 140, potassium 3.9, chloride 110, BUN 12, creatinine 0.5  TSH 5.4 with free T4 1.05  WBC 6.7, hemoglobin 15.4, hematocrit 47.2, platelet 253    Cardiac Imaging and Procedures Review:    EKG dated 4/20/2020: My personal interpretation is normal sinus rhythm with heart rate 81 bpm, LVH    Echo dated 2017:   Left ventricular ejection fraction is visually estimated to be 60%.  Normal left ventricular wall thickness.  No significant valve disease or flow abnormalities.   Mildly eleveated right atrial pressure.  Estimated right ventricular systolic pressure is 35 mmHg.      Outside radiology test Review 11/3/2020:  CT head without contrast: Normal CT head without intracranial hemorrhage or masses.    MR angiogram of the head and neck: Unremarkable MR angiogram with no evidence of dissection or stenosis.      Assessment & Plan     1. Hypertensive urgency  chlorthalidone (HYGROTON) 25 MG Tab    valsartan (DIOVAN) 320 MG tablet    amLODIPine (NORVASC) 5 MG Tab   2. LVH (left ventricular hypertrophy)  EC-ECHOCARDIOGRAM COMPLETE W/O CONT   3. Dyslipidemia  LIPID PANEL   4. Essential hypertension  valsartan (DIOVAN) 320 MG tablet    OVERNIGHT PULSE OXIMETRY    EC-ECHOCARDIOGRAM COMPLETE W/O CONT    Basic Metabolic Panel   5. High risk medication use  Basic Metabolic  Panel       Blood pressure is significantly elevated in the clinic today.  Patient does report mild headache otherwise no concerning symptoms of vision changes, dizziness or nausea.      Change hydrochlorothiazide to chlorthalidone 25 mg daily.  Increase valsartan to 320 mg daily.  Initiate amlodipine 5 mg daily.    ECG demonstrates LVH, hence, obtain transthoracic echocardiogram to evaluate underlying cardiac structure and function.    Obtain overnight pulse oximetry to rule out sleep apnea as a potential cause for uncontrolled hypertension.    Obtain BMP in 1 week after initiating new medications to evaluate electrolytes and kidney function which has been discussed with the patient.    Obtain yearly lipid panel.  Continue atorvastatin 40 mg daily.    Drug-drug interactions and possible adverse effects of the medications have been discussed in detail with the patient.  Continue daily blood pressure monitoring at home with goal BP less than 140/90.  If blood pressure continues to stay elevated after 1 week of initiating new medications, increase amlodipine to 10 mg daily.      All of patient's excellent questions were answered to the best of my knowledge and to her satisfaction.  It was a pleasure seeing Ms. Nazia Lyle in my clinic today. Return in about 2 months (around 1/5/2021). Patient is aware to call the cardiology clinic with any questions or concerns.      Leon Herman MD  [facility]  [facility], [address]  [address] [address]  [place] [address]  Phone: [phone]  Fax: [phone]

## 2020-11-05 NOTE — TELEPHONE ENCOUNTER
"Please disregard the BP listed by the MA below  (incorrect).    Patient informs me that her BP has been running normally about 160-170/, but Saturday night during sex, she got a sudden \"terrible headache\" which followed 5-10 minutes of lying still,  and not remembering anything during that time. Her BP was 198/134.   After she recovered she took her BP and water pill and was feeling better.      The high BP reoccurred again on Tuesday 228/101, and she went to the ER.  She says that during the hospital stay her BP has gotten up to 215/134, and \"even the IV BP med did not help (Labetalol).\"  She was there for 7 hours and got an MRI, EKG, Lab, and nothing was found.  The ER doctor took her off of her Furosemide and put her on HCTZ and since then she has been somewhat better - BP's back to the \"normal\" as noted above (this am 160/101).  She continues on Diovan and HCTZ.  She would feel better if she could get in for an appointment today as opposed to waiting until next week to see Harpal.      Records received.  Of note, she is hypothyroid (TSH 8.59).      "

## 2020-11-13 ENCOUNTER — TELEPHONE (OUTPATIENT)
Dept: CARDIOLOGY | Facility: MEDICAL CENTER | Age: 60
End: 2020-11-13

## 2020-11-13 NOTE — TELEPHONE ENCOUNTER
Looks like her blood pressure is significantly better from before.  No new medication changes.  Please recommend that she should continue checking her blood pressure at least once daily and inform us back if it is sustaining above 140/90.    Reviewed her labs as well which are normal.    Thanks.  DA

## 2020-11-13 NOTE — TELEPHONE ENCOUNTER
DINO    Pt calling in regards to BP and HR readings over the last couple of days as requested by DINO  11/8 AM: BP- 144/96 HR- 73          PM: BP- 128/89 HR- 72  11/9 AM: didn't take            PM: BP- 144/95 HR- 73  11/10: didn't take   11/11: didn't take  11/12 AM: BP- 116/80 HR- 79            PM: BP- 109/72 HR- 69  11/13 AM: BP- 119/92 HR- 73    Pt also states that she did her labs yesterday 11/12.  Please call if needed at [phone].  Thank you.

## 2020-11-24 ENCOUNTER — TELEMEDICINE (OUTPATIENT)
Dept: HEALTH INFORMATION MANAGEMENT | Facility: MEDICAL CENTER | Age: 60
End: 2020-11-24
Payer: COMMERCIAL

## 2020-11-24 DIAGNOSIS — E88.810 METABOLIC SYNDROME: ICD-10-CM

## 2020-11-24 DIAGNOSIS — I10 ESSENTIAL HYPERTENSION: ICD-10-CM

## 2020-11-24 DIAGNOSIS — R73.01 ELEVATED FASTING BLOOD SUGAR: ICD-10-CM

## 2020-11-24 DIAGNOSIS — E66.01 MORBID OBESITY (HCC): ICD-10-CM

## 2020-11-24 DIAGNOSIS — E55.9 VITAMIN D DEFICIENCY: ICD-10-CM

## 2020-11-24 DIAGNOSIS — E78.5 DYSLIPIDEMIA: ICD-10-CM

## 2020-11-24 DIAGNOSIS — R63.5 WEIGHT GAIN: ICD-10-CM

## 2020-11-24 PROCEDURE — 99213 OFFICE O/P EST LOW 20 MIN: CPT | Mod: 95,CR | Performed by: INTERNAL MEDICINE

## 2020-11-24 NOTE — PROGRESS NOTES
This evaluation was conducted via Zoom using secure and encrypted videoconferencing technology. The patient was in a private location in the state of [place].    The patient's identity was confirmed and verbal consent was obtained for this virtual visit.      Bariatric Medicine Follow Up  Chief Complaint   Patient presents with   • Weight Gain       History of Present Illness:   Nazia Lyle is a 60 y.o. female who presents for follow-up to intensive behavioral modification of overweight and to help address co-morbidities caused by overweight, as below.    Obesity Stage/Class: 1/3  During the patient's last visit, the following were discussed and recommended:  Weight Goal: 200 or less lb  Dietary intervention:    MR:  1 ND daily qam, some days 1/2 bar in afternoon  Salad for L  High Protein/Low Carb whole food meals and 2 snacks between meals daily  >100 g protein, <100 g total carbs daily, 1200 kcal/d   Track daily intake with My Fitness Pal, bring to next visit  64+ oz water per day  Physical Activity:  Walking video 5 d/wk  Labs:  As previously ordered CMP, lipids  Rx changes:   Consider AOM  Behavior modification:   As above  Surgical considerations: Consider referral to bariatric surgery if unsuccessful with medical weight loss    ___    Challenges:  Recently uncontrolled BP, hypertensive urgency  Dietary adherence:  Good  1 ND MR daily  Salad for L, lean meat, veg D  Not counting carbs  Exercise:  Walking video 20 min x 5 d/wk    AntiObesity Medication use: none  Medication efficacy/tolerance/side effects: n/a  Medication compliance: n/a    Status of comorbid conditions/other medical diagnoses:  HLD:  Uncontrolled TGs, on statin  HTN:  Recently uncontrolled, seen at [facility], increase valsartan dose, added Norvasc, chlorthalidone  IFG:  Uncontrolled on most recent labs  VDD: Controlled on daily vitamin D       Review of Systems   Pt denies lightheadedness, weakness, worsening fatigue, excessive dry mouth,  mood changes, paresthesias.  All other ROS were reviewed and are otherwise unchanged from my previous visit with patient.    Physical Exam:    There were no vitals taken for this visit.      Current wt:  258 lb  Last weight: 262.5 lb  Weight change since last visit:  -3.5 lb   BMI 39    Constitutional: Oriented to person, place, and time and well-developed, well-nourished, and in no distress.    Head: Normocephalic.   Psychiatric: Mood, memory, affect and judgment normal.     Laboratory:   Recent labs reviewed.      Dietitian Assessment: n/a    ASSESSMENT  60 y.o.  female presents for intensive lifestyle intervention for treatment of obesity and co-morbid conditions.  Obesity Stage ([place])/Class: 1/3    1. Weight gain     2. Dyslipidemia     3. Essential hypertension     4. Elevated fasting blood sugar     5. Morbid obesity (HCC)     6. Vitamin D deficiency     7. Metabolic syndrome         The patient continues to reverse her previous weight gain.  Obesity still uncontrolled which likely is contributing to uncontrolled hypertension at times.  Encouraged patient to focus again on what is working well for her, reducing refined carbohydrates, watching sodium intake.  Continue to monitor lipids, fasting glucose, blood pressure, vitamin D.  Triglycerides, fasting glucose elevated on most recent labs, indicative of higher CHO intake.    PLAN  Weight Goal:  200 or less lb  Dietary intervention:    MR:  Everardo ND qam via mail order  High Protein/Low Carb whole food meals and 2 snacks between meals daily  >100 g protein, <100 g total carbs daily, 1200 kcal/d   Track daily intake with My Fitness Pal, bring to next visit  64+ oz water per day  Physical Activity: Walking x20 minutes at least 5 days/week  Labs: Reviewed recent outside labs  Rx changes:   None  Consider AOM once blood pressure well controlled  Behavior modification:   Resume tracking intake  Surgical considerations: Refer patient to bariatric surgery if  unsuccessful with medical weight loss  Follow-up: one month with MD    Patient's body mass index is unknown because there is no height or weight on file. Exercise and nutrition counseling were performed at this visit.  BMI 39

## 2020-12-08 ENCOUNTER — HOSPITAL ENCOUNTER (OUTPATIENT)
Dept: CARDIOLOGY | Facility: MEDICAL CENTER | Age: 60
End: 2020-12-08
Attending: INTERNAL MEDICINE
Payer: COMMERCIAL

## 2020-12-08 DIAGNOSIS — I10 ESSENTIAL HYPERTENSION: ICD-10-CM

## 2020-12-08 DIAGNOSIS — I51.7 LVH (LEFT VENTRICULAR HYPERTROPHY): ICD-10-CM

## 2020-12-08 PROCEDURE — 93306 TTE W/DOPPLER COMPLETE: CPT

## 2020-12-09 LAB
LV EJECT FRACT MOD 2C 99903: 64.15
LV EJECT FRACT MOD 4C 99902: 77.97
LV EJECT FRACT MOD BP 99901: 71.61

## 2020-12-09 PROCEDURE — 93306 TTE W/DOPPLER COMPLETE: CPT | Mod: 26 | Performed by: INTERNAL MEDICINE

## 2020-12-21 DIAGNOSIS — E03.9 ACQUIRED HYPOTHYROIDISM: ICD-10-CM

## 2020-12-21 NOTE — TELEPHONE ENCOUNTER
Received request via: Pharmacy    Was the patient seen in the last year in this department? Yes    Does the patient have an active prescription (recently filled or refills available) for medication(s) requested? No       SYNTHROID 112 MCG Tab    Sig: TAKE ONE TABLET BY MOUTH EVERY MORNING ON AN EMPTY STOMACH

## 2020-12-22 RX ORDER — LEVOTHYROXINE SODIUM 112 MCG
TABLET ORAL
Qty: 90 TAB | Refills: 0 | Status: SHIPPED | OUTPATIENT
Start: 2020-12-22 | End: 2021-06-01

## 2020-12-23 ENCOUNTER — TELEPHONE (OUTPATIENT)
Dept: CARDIOLOGY | Facility: MEDICAL CENTER | Age: 60
End: 2020-12-23

## 2020-12-24 NOTE — TELEPHONE ENCOUNTER
Message  Received: 2 weeks ago  Message Contents   FAISAL Clark R.N.             Please let the patient know that her echocardiogram overall looks good except for mild thickening of the heart muscle which is due to high blood pressure.  No interventions needed at this time except for keeping a tight control on BP.     Thanks!   DA      --------------------------------------------------------  S/w pt and notified her of above notes from Dr. Herman. Pt will bring in her BP log to her next follow up 1/05.

## 2021-01-12 ENCOUNTER — TELEPHONE (OUTPATIENT)
Dept: CARDIOLOGY | Facility: MEDICAL CENTER | Age: 61
End: 2021-01-12

## 2021-01-12 NOTE — TELEPHONE ENCOUNTER
Called [facility] and requested for OPO report to be faxed to our office. They state they will be sending report to [facility] ([phone]).

## 2021-01-13 ENCOUNTER — TELEMEDICINE (OUTPATIENT)
Dept: CARDIOLOGY | Facility: MEDICAL CENTER | Age: 61
End: 2021-01-13
Payer: COMMERCIAL

## 2021-01-13 VITALS
WEIGHT: 259 LBS | SYSTOLIC BLOOD PRESSURE: 119 MMHG | HEART RATE: 68 BPM | DIASTOLIC BLOOD PRESSURE: 84 MMHG | HEIGHT: 68 IN | BODY MASS INDEX: 39.25 KG/M2

## 2021-01-13 DIAGNOSIS — E78.5 DYSLIPIDEMIA: ICD-10-CM

## 2021-01-13 DIAGNOSIS — R79.81 ABNORMAL PULSE OXIMETRY: ICD-10-CM

## 2021-01-13 DIAGNOSIS — E78.6 LOW HDL (UNDER 40): ICD-10-CM

## 2021-01-13 DIAGNOSIS — I10 ESSENTIAL HYPERTENSION: ICD-10-CM

## 2021-01-13 PROCEDURE — 99214 OFFICE O/P EST MOD 30 MIN: CPT | Mod: 95,CR | Performed by: INTERNAL MEDICINE

## 2021-01-13 ASSESSMENT — ENCOUNTER SYMPTOMS
SYNCOPE: 0
ORTHOPNEA: 0
VOMITING: 0
LIGHT-HEADEDNESS: 1
WHEEZING: 0
IRREGULAR HEARTBEAT: 0
HEADACHES: 0
WEAKNESS: 0
DECREASED APPETITE: 0
DIZZINESS: 0
SHORTNESS OF BREATH: 0
PARESTHESIAS: 0
ABDOMINAL PAIN: 0
NAUSEA: 0
NUMBNESS: 0
PALPITATIONS: 0
SLEEP DISTURBANCES DUE TO BREATHING: 0
DIAPHORESIS: 0

## 2021-01-13 NOTE — PROGRESS NOTES
Cardiology Telemedicine Visit Follow-up Consultation Note    Date of note:    1/13/2021    Primary Care Provider: Tee Cedeño M.D.    Name:             Nazia Lyle   YOB: 1960  MRN:               [id number]      This evaluation was conducted via Zoom, using secure and encrypted videoconferencing technology.  The patient's identity was confirmed and verbal consent for the telemedicine encounter was obtained.       Chief Complaint   Patient presents with   • HTN (Controlled)   • Dyslipidemia       HISTORY OF PRESENT ILLNESS  Ms. Nazia Lyle is a 60 y.o. female who returns to see us for follow-up of hypertension and hyperlipidemia.    Last clinic visit: 11/5/2020    Interim History:  Since our last visit patient states doing significantly better with well-controlled blood pressure at home.  She was started on chlorthalidone and amlodipine along with increase of valsartan.  States that she checks her blood pressure twice daily with morning blood pressure 120s/80s and evening blood pressure 110s/70s.  Did experience lightheadedness and fatigue after initiation of the medications, however, her symptoms are now improved.  Does have occasional lightheadedness with positional change but denies any episodes of dizziness, presyncope or syncopal events.    Underwent overnight pulse oximetry test which was abnormal and showed episodes of hypoxia.    Has been compliant with her modified diet and has been successful in losing some weight.  Is now planning to start over-the-counter medication to help with weight loss.      Review of Systems   Constitution: Negative for decreased appetite, diaphoresis and malaise/fatigue.   Cardiovascular: Negative for chest pain, irregular heartbeat, leg swelling, orthopnea, palpitations and syncope.   Respiratory: Negative for shortness of breath, sleep disturbances due to breathing and wheezing.    Gastrointestinal: Negative for abdominal pain, nausea and  vomiting.   Neurological: Positive for light-headedness. Negative for dizziness, headaches, numbness, paresthesias and weakness.       Past Medical History:   Diagnosis Date   • Dyslipidemia 9/17/2012   • History of kidney stones    • HTN (hypertension) 9/17/2012   • Hypothyroid    • Obesity 9/17/2012         Past Surgical History:   Procedure Laterality Date   • HARDWARE REMOVAL UPPER EXTREMITY Left 8/11/2015    Procedure: HARDWARE REMOVAL UPPER EXTREMITY;  Surgeon: Chaka Mckeon M.D.;  Location: SURGERY [facility];  Service:    • APPENDECTOMY     • CHOLECYSTECTOMY     • PRIMARY C SECTION     • TONSILLECTOMY     • TONSILLECTOMY           Current Outpatient Medications   Medication Sig Dispense Refill   • SYNTHROID 112 MCG Tab TAKE ONE TABLET BY MOUTH EVERY MORNING ON AN EMPTY STOMACH 90 Tab 0   • VITAMIN D, CHOLECALCIFEROL, PO Take  by mouth.     • chlorthalidone (HYGROTON) 25 MG Tab Take 1 Tab by mouth every day. (Patient taking differently: Take 25 mg by mouth every day. Taking AT NIGHT) 30 Tab 5   • valsartan (DIOVAN) 320 MG tablet Take 1 Tab by mouth every day. (Patient taking differently: Take 320 mg by mouth every day. Taking at night) 90 Tab 3   • amLODIPine (NORVASC) 5 MG Tab Take 1 Tab by mouth every day. (Patient taking differently: Take 5 mg by mouth every day. Taking at night) 30 Tab 5   • atorvastatin (LIPITOR) 40 MG Tab Take 1 Tab by mouth every evening. 90 Tab 3   • omega-3 acid ethyl esters (LOVAZA) 1 GM capsule Take 2 Caps by mouth 2 Times a Day. 360 Cap 3   • Multiple Vitamins-Minerals (MULTI COMPLETE PO) Take  by mouth.     • B Complex Vitamins (VITAMIN B COMPLEX PO) Take  by mouth.     • Probiotic Product (SOLUBLE FIBER/PROBIOTICS PO) Take  by mouth.       No current facility-administered medications for this visit.          Allergies   Allergen Reactions   • Latex Rash     rash   • Nkda [No Known Drug Allergy]          Family History   Problem Relation Age of Onset   • Thyroid Mother    •  "Diabetes Father    • Hypertension Father    • Diabetes Maternal Grandmother    • Diabetes Maternal Grandfather    • Heart Disease Paternal Grandmother    • Cancer Paternal Grandfather          Social History     Socioeconomic History   • Marital status:      Spouse name: Not on file   • Number of children: Not on file   • Years of education: Not on file   • Highest education level: Not on file   Occupational History   • Not on file   Social Needs   • Financial resource strain: Not on file   • Food insecurity     Worry: Not on file     Inability: Not on file   • Transportation needs     Medical: Not on file     Non-medical: Not on file   Tobacco Use   • Smoking status: Never Smoker   • Smokeless tobacco: Never Used   Substance and Sexual Activity   • Alcohol use: No   • Drug use: No     Comment: , 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, works as human    • Sexual activity: Not on file   Lifestyle   • Physical activity     Days per week: Not on file     Minutes per session: Not on file   • Stress: Not on file   Relationships   • Social connections     Talks on phone: Not on file     Gets together: Not on file     Attends Confucianism service: Not on file     Active member of club or organization: Not on file     Attends meetings of clubs or organizations: Not on file     Relationship status: Not on file   • Intimate partner violence     Fear of current or ex partner: Not on file     Emotionally abused: Not on file     Physically abused: Not on file     Forced sexual activity: Not on file   Other Topics Concern   • Not on file   Social History Narrative   • Not on file         Physical Exam:  Vitals as provided by the patient  /84 (BP Location: Left arm, Patient Position: Sitting, BP Cuff Size: Adult)   Ht 1.727 m (5' 8\")   Wt 117.5 kg (259 lb)    Oxygen Therapy:     BP Readings from Last 4 Encounters:   01/13/21 119/84   11/05/20 (!) 192/102   10/28/20 124/82   08/19/20 130/80 "       Weight/BMI: Body mass index is 39.38 kg/m².  Wt Readings from Last 4 Encounters:   01/13/21 117.5 kg (259 lb)   11/05/20 117.9 kg (260 lb)   10/28/20 119.1 kg (262 lb 9.6 oz)   09/24/20 121.1 kg (267 lb)       GEN: Well developed, well nourished and in no acute distress.  Neck:  No JVD noted at 90 degrees, trachea midline  CVS: Pulse as reported by patient, no visible LE edema.  Resp: Unlabored respiratory effort, no cough or audible wheeze  MSK/Ext: No clubbing or cyanosis visible appreciated.  Skin: No rashes in visible areas.  Psych: A&O x 3, appropriate affect, good judgement, well groomed      Lab Data Review:  Sodium 139, potassium 3.8, bicarb 30, BUN 16, creatinine 1.8  Total cholesterol 146, HDL 24, LDL 88, triglycerides 167    Cardiac Imaging and Procedures Review:    Overnight pulse oximetry test:  Lowest oxygen saturation 66%, total time with oxygen saturation less than 88% is 39 minutes      Assessment & Plan     1. Abnormal pulse oximetry  REFERRAL TO PULMONARY AND SLEEP MEDICINE   2. Essential hypertension     3. Dyslipidemia  Lipid Profile   4. Low HDL (under 40)  Lipid Profile       For abnormal overnight pulse oximetry test, referral to sleep medicine for formal sleep study.    Blood pressure well controlled.  Continue current medical regimen with chlorthalidone 25 mg, amlodipine 5 mg and valsartan 340 mg daily.    For low HDL level, discussed aerobic exercise and niacin supplementation.    Obtain repeat lipid panel in 6 months.      All of patient's excellent questions were answered to the best of my knowledge and to her satisfaction.  It was a pleasure seeing Ms. Nazia Lyle in my clinic today. Return in about 6 months (around 7/13/2021). Patient is aware to call the cardiology clinic with any questions or concerns.      Leon Herman MD  [facility]  [place] [facility], [facility]  [address] [address]  Phone:  [phone]  Fax: [phone]

## 2021-03-31 ENCOUNTER — OFFICE VISIT (OUTPATIENT)
Dept: HEALTH INFORMATION MANAGEMENT | Facility: MEDICAL CENTER | Age: 61
End: 2021-03-31
Payer: COMMERCIAL

## 2021-03-31 VITALS
BODY MASS INDEX: 40.59 KG/M2 | HEART RATE: 75 BPM | HEIGHT: 68 IN | DIASTOLIC BLOOD PRESSURE: 74 MMHG | SYSTOLIC BLOOD PRESSURE: 126 MMHG | OXYGEN SATURATION: 94 % | WEIGHT: 267.8 LBS

## 2021-03-31 DIAGNOSIS — E66.01 MORBID OBESITY (HCC): ICD-10-CM

## 2021-03-31 DIAGNOSIS — E55.9 VITAMIN D DEFICIENCY: ICD-10-CM

## 2021-03-31 DIAGNOSIS — R63.5 WEIGHT GAIN: ICD-10-CM

## 2021-03-31 DIAGNOSIS — E78.00 HYPERCHOLESTEROLEMIA: ICD-10-CM

## 2021-03-31 DIAGNOSIS — R73.01 ELEVATED FASTING BLOOD SUGAR: ICD-10-CM

## 2021-03-31 DIAGNOSIS — I10 ESSENTIAL HYPERTENSION: ICD-10-CM

## 2021-03-31 DIAGNOSIS — E88.810 METABOLIC SYNDROME: ICD-10-CM

## 2021-03-31 PROCEDURE — 99214 OFFICE O/P EST MOD 30 MIN: CPT | Performed by: INTERNAL MEDICINE

## 2021-03-31 ASSESSMENT — PATIENT HEALTH QUESTIONNAIRE - PHQ9: CLINICAL INTERPRETATION OF PHQ2 SCORE: 0

## 2021-03-31 NOTE — PROGRESS NOTES
"Bariatric Medicine Follow Up  Chief Complaint   Patient presents with   • Weight Gain       History of Present Illness:   Nazia Lyle is a 61 y.o. female who presents for follow-up to intensive behavioral modification of overweight and to help address below co-morbidities caused by overweight.      Weight Management History to Date:  11/24/2020:  Start ND MR qam  Tracking, low carb  Considering AOM but deferred    ___    Challenges since last visit: Holidays, no consistent follow-up  Dietary adherence:  Fair  Has not been tracking  Wants to resume, not watching carbs all the time  Feels very hungry if not having ND MR in am  Exercise:  Has not been walking    AntiObesity Medication use: None  Does not want to add additional medication  Medication efficacy/tolerance/side effects: N/A  Medication compliance: N/A    Status of comorbid conditions/other medical diagnoses:  IFG: Uncontrolled on last labs, not on glucose lowering agent  HTN: Controlled with amlodipine, valsartan, chlorthalidone  VDD: Has been controlled with regular vitamin D supplement  HLD: Controlled?  No recent labs, on statin and omega-3 fatty acids       Physical Exam:    /74 (BP Location: Left arm, Patient Position: Sitting, BP Cuff Size: Large adult)   Pulse 75   Ht 1.727 m (5' 8\")   Wt 121 kg (267 lb 12.8 oz)   SpO2 94%   BMI 40.72 kg/m²   Waist Measurement   Waist: 47.5 inch/inches  Weight change since last visit: +5 lb (-11 lb total)  Waist Circum change since last visit: -1 in (-1 in total)    Physical findings unchanged from prior exam.    Laboratory:   Recent labs reviewed.     ASSESSMENT  61 y.o.  female presents for intensive lifestyle intervention for treatment of obesity and co-morbid conditions.  Obesity Stage ([place])/Class: 1/3    1. Elevated fasting blood sugar  CMP14+LP    HEMOGLOBIN A1C   2. Essential hypertension  CMP14+LP   3. Vitamin D deficiency  VITAMIN D 25-HYDROXY   4. Metabolic syndrome  CMP14+LP   5. " Weight gain  TSH WITH REFLEX TO FT4   6. Morbid obesity (HCC)     7. Hypercholesterolemia         The patient is struggled with weight regain but it has been mild.  Continue to focus on lower carb intake, portion size and timing of meals.  Use of stimulus narrowing really helps her stay on track.  Update baseline labs as ordered to monitor fasting glucose, vitamin D, lipids.    PLAN  Weight Goal: 200 lb  Reduce use of antihypertensives  Dietary intervention:    MR:  1 MR vanilla or bar or pudding qam  High Protein/Low Carb whole food meals and 2 snacks between meals daily  >100 g protein, <100 g total carbs daily, written tracking  64+ oz water per day  Avoid skipping a.m. meal  Physical Activity:  Resume 20 min walking/d  Labs: CMP, lipids, A1c, TSH, vitamin D  Rx changes:   Patient declines but would consider GLP-1 agonist  Behavior modification:   Maintain consistency with above changes, increase activity level significantly  Surgical considerations:  Declines referral to bariatric surgery  Follow-up: one month with MD telemed  See also recent RD notes and follow-up.      Patient's body mass index is 40.72 kg/m². Exercise and nutrition counseling were performed at this visit.

## 2021-05-04 ENCOUNTER — TELEMEDICINE (OUTPATIENT)
Dept: HEALTH INFORMATION MANAGEMENT | Facility: MEDICAL CENTER | Age: 61
End: 2021-05-04
Payer: COMMERCIAL

## 2021-05-04 VITALS — HEIGHT: 68 IN | WEIGHT: 263 LBS | BODY MASS INDEX: 39.86 KG/M2

## 2021-05-04 DIAGNOSIS — E66.01 MORBID OBESITY (HCC): ICD-10-CM

## 2021-05-04 DIAGNOSIS — E78.5 DYSLIPIDEMIA: ICD-10-CM

## 2021-05-04 DIAGNOSIS — I10 ESSENTIAL HYPERTENSION: ICD-10-CM

## 2021-05-04 DIAGNOSIS — E88.810 METABOLIC SYNDROME: ICD-10-CM

## 2021-05-04 DIAGNOSIS — E55.9 VITAMIN D DEFICIENCY: ICD-10-CM

## 2021-05-04 DIAGNOSIS — R73.01 ELEVATED FASTING BLOOD SUGAR: ICD-10-CM

## 2021-05-04 PROCEDURE — 99213 OFFICE O/P EST LOW 20 MIN: CPT | Mod: 95,CR | Performed by: INTERNAL MEDICINE

## 2021-05-04 ASSESSMENT — PATIENT HEALTH QUESTIONNAIRE - PHQ9: CLINICAL INTERPRETATION OF PHQ2 SCORE: 0

## 2021-05-04 NOTE — PROGRESS NOTES
"This evaluation was conducted via Zoom using secure and encrypted videoconferencing technology. The patient was in a private location in the state [facility].    The patient's identity was confirmed and verbal consent was obtained for this virtual visit.    Bariatric Medicine Follow Up  Chief Complaint   Patient presents with   • Weight Gain       History of Present Illness:   Nazia Lyle is a 61 y.o. female who presents for follow-up to intensive behavioral modification of overweight and to help address below co-morbidities caused by overweight.      Weight Management History to Date:  3/31/2021:  Mild weight regain  1 ND MR daily  Tracking less than 100 g CHO per day with written tracking  Avoid skipping a.m. meal  Resume walking 20 minutes daily      11/24/2020:  Start ND MR qam  Tracking, low carb  Considering AOM but deferred  ___    Challenges since last visit: Fewer  Dietary adherence: Good  Much more mindful about what and when eating  Trying not to skip breakfast  Does better when organized  Having ND MR in am  Salad with protein for L daily  Choosing low carb if she has starch  Carbs are a trigger  Exercise:  Walking with spouse, 3 d/wk    AntiObesity Medication use: None, patient defers  Medication efficacy/tolerance/side effects: N/A  Medication compliance: N/A    Status of comorbid conditions/other medical diagnoses:  IFG: Uncontrolled with higher carb intake, not on glucose lowering agent  HLD: Controlled, on omega-3 fatty acids, statin  VDD: Controlled       Physical Exam:    Ht 1.727 m (5' 8\")   Wt 119 kg (263 lb)   BMI 39.99 kg/m²       Last wt 268 lb  Weight change since last visit:  -5 lb     Physical findings unchanged from prior exam.    Laboratory:   Recent labs reviewed. No updated labs yet.    ASSESSMENT  61 y.o.  female presents for intensive lifestyle intervention for treatment of obesity and co-morbid conditions.  Obesity Stage ([place])/Class: 1/3    1. Elevated fasting blood " sugar     2. Dyslipidemia     3. Essential hypertension     4. Vitamin D deficiency     5. Metabolic syndrome     6. Morbid obesity (HCC)         The patient has begun to make some very positive changes, becoming very mindful about carbohydrate intake and exercising more.  We will continue to monitor fasting glucose, lipids, blood pressure, vitamin D as she progresses through the program.  Encouraged to make small positive changes that she feels she can follow.  Consider antiobesity medication pending course.    PLAN  Weight Goal: 200 lb, reduce antihypertensive meds  Dietary intervention:    MR:  1 ND qam instead of skipping  Salad with protein for lunch most days  High Protein/Low Carb whole food meals and 2 snacks between meals daily  >100 g protein, <100 g total carbs daily, tracking  Avoid skipping breakfast  Physical Activity:  Walking goal 5 d/wk  Labs: Await updated labs  Rx changes:   Consider GLP-1 agonist, patient defers  Behavior modification:   Maintain consistency with above changes  Surgical considerations: Patient declines referral to bariatric surgery  Follow-up: one month with MD  See also recent RD notes and follow-up.      Patient's body mass index is 39.99 kg/m². Exercise and nutrition counseling were performed at this visit.

## 2021-05-06 ENCOUNTER — TELEMEDICINE (OUTPATIENT)
Dept: SLEEP MEDICINE | Facility: MEDICAL CENTER | Age: 61
End: 2021-05-06
Payer: COMMERCIAL

## 2021-05-06 VITALS
DIASTOLIC BLOOD PRESSURE: 89 MMHG | BODY MASS INDEX: 39.86 KG/M2 | SYSTOLIC BLOOD PRESSURE: 113 MMHG | HEIGHT: 68 IN | WEIGHT: 263 LBS

## 2021-05-06 DIAGNOSIS — G47.33 OSA (OBSTRUCTIVE SLEEP APNEA): ICD-10-CM

## 2021-05-06 DIAGNOSIS — G47.34 NOCTURNAL HYPOXIA: ICD-10-CM

## 2021-05-06 PROCEDURE — 99203 OFFICE O/P NEW LOW 30 MIN: CPT | Mod: 95 | Performed by: FAMILY MEDICINE

## 2021-05-06 NOTE — PROGRESS NOTES
"Telemedicine Visit: New Patient     This encounter was conducted via Zoom . Verbal consent was obtained. Patient's identity was verified.Given the importance of social distancing and other strategies recommended to reduce the risk of COVID-19 transmission, I am providing medical care to this patient via audio/video visit in place of an in person visit at the request of the patient.        [facility]  Consult Note     Date: 5/6/2021 / Time: 1:29 PM    Patient ID:   Name:             Nazia Lyle   YOB: 1960  Age:                 61 y.o.  female   MRN:               [id number]      Thank you for requesting a sleep medicine consultation on Nazia Lyle at the sleep center. She presents today with the chief complaints of uncontrolled BP and abnormal oxygen. She is referred by Dr. Herman for evaluation and treatment of sleep disorder breathing.     HISTORY OF PRESENT ILLNESS:       At night,  Nazia Lyle goes to bed around 9-9:30 pm on weekdays and around 10:30-11 pm on the weekends. She gets out of bed at 5:30 am on weekdays and at 6:30 am on the weekends.  She averages about 6-7 hrs of sleep on a good night. Pt rarely has bad nights. She falls asleep within 5-30 minutes. She wakes up about couple times during the night due to bathroom use and \"sounds\" and on average It takes her few min to fall back asleep. She is aware of rare snoringgasping choking breathing pauses/gasping or choking in sleep.  She  denies any symptoms of restless legs syndrome such as an \"urge to move\"  She  legs in the evening or bedtime. She  denies any symptoms of narcolepsy such as sleep paralysis or cataplexy, or any symptoms to suggest parasomnias such as sleep walking or acting out of dreams. She  has not used any medications for the sleep problem.Overall, she does finds her sleep refreshing.In terms of  excessive daytime sleepiness,  She rareky has sleepiness while  at work, while  watching TV " however denies while driving.She does not take regular naps. She drinks about 1 caffeinated beverages per day.    Her other medical conditions include hypertension, dyslipidemia, hypothyroidism, vitamin D deficiency and LVH.  Her last echocardiogram was on 12/8/2020 which showed preserved EF of 60 to 65%, trace TR and concentric left ventricular hypertrophy.  Her current medications include chlorthalidone, amlodipine, Synthroid, losartan and atorvastatin along with other multivitamins.  She had an OPO on 11/15/2020 which showed O2 shree of 66, average O2 88 and basal 98%.  She spent 59% of the total recording time below 90% SPO2.    REVIEW OF SYSTEMS:       Constitutional: Denies fevers, Denies weight changes  Eyes: Denies changes in vision, no eye pain  Ears/Nose/Throat/Mouth: + nasal congestion   Cardiovascular: Denies chest pain or palpitations   Respiratory: Denies shortness of breath , Denies cough  Gastrointestinal/Hepatic: Denies abdominal pain, nausea, vomiting,   Musculoskeletal/Rheum: Denies  joint pain and swelling   Skin/Breast: Denies rash  Neurological:+ sinus headache,denies  confusion, memory loss or focal weakness/parasthesias  Psychiatric: denies mood disorder     Comprehensive review of systems form is reviewed with the patient and is attached in the EMR.     PMH:  has a past medical history of Dyslipidemia (9/17/2012), History of kidney stones, HTN (hypertension) (9/17/2012), Hypothyroid, and Obesity (9/17/2012).  MEDS:   Current Outpatient Medications:   •  chlorthalidone (HYGROTON) 25 MG Tab, take 1 tablet by mouth once daily, Disp: 90 tablet, Rfl: 2  •  amLODIPine (NORVASC) 5 MG Tab, take 1 tablet by mouth once daily, Disp: 90 tablet, Rfl: 2  •  SYNTHROID 112 MCG Tab, TAKE ONE TABLET BY MOUTH EVERY MORNING ON AN EMPTY STOMACH, Disp: 90 Tab, Rfl: 0  •  VITAMIN D, CHOLECALCIFEROL, PO, Take  by mouth., Disp: , Rfl:   •  valsartan (DIOVAN) 320 MG tablet, Take 1 Tab by mouth every day. (Patient  "taking differently: Take 320 mg by mouth every day. Taking at night), Disp: 90 Tab, Rfl: 3  •  atorvastatin (LIPITOR) 40 MG Tab, Take 1 Tab by mouth every evening., Disp: 90 Tab, Rfl: 3  •  omega-3 acid ethyl esters (LOVAZA) 1 GM capsule, Take 2 Caps by mouth 2 Times a Day., Disp: 360 Cap, Rfl: 3  •  Multiple Vitamins-Minerals (MULTI COMPLETE PO), Take  by mouth., Disp: , Rfl:   •  B Complex Vitamins (VITAMIN B COMPLEX PO), Take  by mouth., Disp: , Rfl:   •  Probiotic Product (SOLUBLE FIBER/PROBIOTICS PO), Take  by mouth., Disp: , Rfl:   ALLERGIES:   Allergies   Allergen Reactions   • Latex Rash     rash   • Nkda [No Known Drug Allergy]      SURGHX:   Past Surgical History:   Procedure Laterality Date   • HARDWARE REMOVAL UPPER EXTREMITY Left 8/11/2015    Procedure: HARDWARE REMOVAL UPPER EXTREMITY;  Surgeon: Chaka Mckeon M.D.;  Location: SURGERY [facility];  Service:    • APPENDECTOMY     • CHOLECYSTECTOMY     • PRIMARY C SECTION     • TONSILLECTOMY     • TONSILLECTOMY       SOCHX:  reports that she has never smoked. She has never used smokeless tobacco. She reports that she does not drink alcohol and does not use drugs.   FH:   Family History   Problem Relation Age of Onset   • Thyroid Mother    • Diabetes Father    • Hypertension Father    • Diabetes Maternal Grandmother    • Diabetes Maternal Grandfather    • Heart Disease Paternal Grandmother    • Cancer Paternal Grandfather        Physical Exam:  Vitals/ General Appearance:   Weight/BMI: Body mass index is 39.99 kg/m².  /89 (BP Location: Left arm, Patient Position: Sitting, BP Cuff Size: Adult)   Ht 1.727 m (5' 8\")   Wt 119 kg (263 lb)   Vitals:    05/06/21 1323   BP: 113/89   BP Location: Left arm   Patient Position: Sitting   BP Cuff Size: Adult   Weight: 119 kg (263 lb)   Height: 1.727 m (5' 8\")           Constitutional: Alert, no distress, well-groomed.  Skin: No rashes in visible areas.  Eye: Round. Conjunctiva clear, lids normal. No icterus. "   ENMT: Lips pink without lesions, good dentition, moist mucous membranes. Phonation normal.  Neck: No masses, no thyromegaly. Moves freely without pain.  CV: Pulse as reported by patient  Respiratory: Unlabored respiratory effort, no cough or audible wheeze  Psych: Alert and oriented x3, normal affect and mood.   INVESTIGATIONS:       ASSESSMENT AND PLAN     1. She  has symptoms of Obstructive Sleep Apnea (MADDI). The pathophysiology of MADDI and the increased risk of cardiovascular morbidity from untreated MADDI is discussed in detail with the patient.    We have discussed diagnostic options including in-laboratory, attended polysomnography and home sleep testing. We have also discussed treatment options including airway pressurization, reconstructive otolaryngologic surgery, dental appliances and weight management.       Subsequently,treatment options will be discussed based on the diagnostic study. Meanwhile, She is urged to avoid supine sleep, weight gain and alcoholic beverages since all of these can worsen MADDI. She is cautioned against drowsy driving. If She feels sleepy while driving, She must pull over for a break/nap, rather than persist on the road, in the interest of She own safety and that of others on the road.    Plan  -  Overnight HST via [employer]  to assess sleep related breathing disorder.   - OPO was reviewed with the patient  2.  Regarding treatment of other past medical problems and general health maintenance,  She is urged to follow up with PCP.

## 2021-05-29 DIAGNOSIS — E03.9 ACQUIRED HYPOTHYROIDISM: ICD-10-CM

## 2021-06-01 DIAGNOSIS — E03.9 ACQUIRED HYPOTHYROIDISM: ICD-10-CM

## 2021-06-01 RX ORDER — LEVOTHYROXINE SODIUM 112 MCG
TABLET ORAL
Qty: 90 TABLET | Refills: 0 | Status: SHIPPED | OUTPATIENT
Start: 2021-06-01 | End: 2023-01-12

## 2021-06-01 NOTE — TELEPHONE ENCOUNTER
Received request via: Pharmacy    Was the patient seen in the last year in this department? No     Does the patient have an active prescription (recently filled or refills available) for medication(s) requested? No

## 2021-06-01 NOTE — TELEPHONE ENCOUNTER
Called and informed patient that refill has been approved and will be mailed by [employer].     Patient requesting lab slips be mailed to her so she can get labs done at [facility].

## 2021-06-17 ENCOUNTER — HOME STUDY (OUTPATIENT)
Dept: SLEEP MEDICINE | Facility: MEDICAL CENTER | Age: 61
End: 2021-06-17
Attending: FAMILY MEDICINE
Payer: COMMERCIAL

## 2021-06-17 DIAGNOSIS — G47.34 NOCTURNAL HYPOXIA: ICD-10-CM

## 2021-06-17 DIAGNOSIS — G47.33 OSA (OBSTRUCTIVE SLEEP APNEA): ICD-10-CM

## 2021-06-20 ENCOUNTER — PATIENT MESSAGE (OUTPATIENT)
Dept: SLEEP MEDICINE | Facility: MEDICAL CENTER | Age: 61
End: 2021-06-20

## 2021-06-20 DIAGNOSIS — G47.33 OSA (OBSTRUCTIVE SLEEP APNEA): ICD-10-CM

## 2021-06-20 PROCEDURE — 95806 SLEEP STUDY UNATT&RESP EFFT: CPT | Performed by: FAMILY MEDICINE

## 2021-06-21 NOTE — PROCEDURES
DIAGNOSTIC HOME SLEEP TEST (HST) REPORT       PATIENT ID:  NAME:  Nazia Llye  MRN:               [id number]  YOB: 1960  DATE OF STUDY: 6/17/21      Impression:     This study shows evidence of:     1.Severe obstructive sleep apnea with  Respiratory Event Index (DANILO) of 60.6 per hour and worse in supine sleep with DANILO at 71.7. These findings are based on the recording time (flow evaluation time).     2.  O2 Sat. shree was 63%, avg O2 was 81 % and baseline O2 at 95 %. O2 sat was below 89% for 424 min of the flow evaluation time. Avg HR 63 BPM.      TECHNICAL DESCRIPTION:  ResMed Device used was a type-III home study device. Home sleep study recording included: Airflow recording by nasal pressure transducer; Respiratory Effort by abdominal Respiratory Bands; O2 by finger oximetry. A position sensor and a snore channel was also used.    Scoring Criteria: A modification of the the AASM Manual for the Scoring of Sleep and Associated Events. Obstructive apnea was scored by cessation of airflow for at least 10 seconds with continuing respiratory effort.  Central apnea was scored by cessation of airflow for at least 10 seconds with no effort.  Hypopnea was scored by a 30% or more reduction in airflow for at least 10 seconds accompanied by an arterial oxygen desaturation of 3% or more.  (For Medicare patients, hypopneas were scored by a 30% or more reduction in airflow for at least 10 seconds accompanied by an arterial oxygen saturation of 4% or more, as required by their insurance, CMS.        General sleep summary: . Total recording time is 559 minutes and total flow evaluation time is 510 minutes. The patient spent 341 minutes in the supine position.      Respiratory events:    Apneas: 98 (Obstructive apnea index 11.3/hr, Central apnea index 0.2 /hr, mixed 0 /hour)  Hypopneas: 417    Recommendations:    1. CPAP titration study vs Auto CPAP trial .   2.   In general patients with sleep apnea  are advised to avoid alcohol and sedatives and to not operate a motor vehicle while drowsy. In some cases alternative treatment options may prove effective in resolving sleep apnea in these options include upper airway surgery, the use of a dental orthotic or weight loss and positional therapy. Clinical correlation is required.         Krunal Wilson MD

## 2021-06-23 NOTE — PATIENT COMMUNICATION
Called pt and schedule SS titration that was order per Dr. Wilson  on 7/15/2021 and Fv apt for the results on 7/29/2021 @ 12:40 pm with Kayden DEWITT

## 2021-07-07 ENCOUNTER — PATIENT MESSAGE (OUTPATIENT)
Dept: SLEEP MEDICINE | Facility: MEDICAL CENTER | Age: 61
End: 2021-07-07

## 2021-07-07 ENCOUNTER — TELEPHONE (OUTPATIENT)
Dept: SLEEP MEDICINE | Facility: MEDICAL CENTER | Age: 61
End: 2021-07-07

## 2021-07-07 DIAGNOSIS — G47.33 OSA (OBSTRUCTIVE SLEEP APNEA): ICD-10-CM

## 2021-07-07 NOTE — TELEPHONE ENCOUNTER
Titration Study denied    Aim denied cpt [id number] can do a Peer to peer by calling [phone] option 2     Ins id [facility] [id number]

## 2021-07-09 NOTE — PATIENT COMMUNICATION
Called pt to ask if it was okay for me to send the order for the new cpap machine to [facility] and pt agreed. I informed the pt that [facility] will contact her regarding the order within 2-3 weeks but if the pt does not hear from them I advise her to give them a call. My chart message was sent to pt with [facility] contact information. I also schedule the pt for an OV for her 1st compliance on 10/27/2021  11:40 am with Dr. Wilson. The pt has a sooner apt on 8/10/2021 with Dr. Wilson but I informed pt that she needs to be on the machine for at least 30 -90 days and pt understood.

## 2021-07-14 NOTE — TELEPHONE ENCOUNTER
Okay to use auto CPAP which was ordered last week.  Please inform the patient about the denial for her titration study.

## 2021-07-15 ENCOUNTER — APPOINTMENT (OUTPATIENT)
Dept: SLEEP MEDICINE | Facility: MEDICAL CENTER | Age: 61
End: 2021-07-15
Attending: FAMILY MEDICINE
Payer: COMMERCIAL

## 2021-07-16 ENCOUNTER — TELEPHONE (OUTPATIENT)
Dept: CARDIOLOGY | Facility: MEDICAL CENTER | Age: 61
End: 2021-07-16

## 2021-07-16 NOTE — TELEPHONE ENCOUNTER
LVM for pt in regards to lab work that was ordered at previous OV. Office number given for call back if pt has any questions or has had lab work done outside of [facility]. Pt has follow up appointment scheduled with Dr. Herman on 07/20/2021.

## 2021-08-16 DIAGNOSIS — E78.5 DYSLIPIDEMIA: ICD-10-CM

## 2021-08-16 RX ORDER — ATORVASTATIN CALCIUM 40 MG/1
TABLET, FILM COATED ORAL
Qty: 90 TABLET | Refills: 1 | Status: SHIPPED | OUTPATIENT
Start: 2021-08-16 | End: 2022-02-28

## 2021-09-07 ENCOUNTER — TELEPHONE (OUTPATIENT)
Dept: CARDIOLOGY | Facility: MEDICAL CENTER | Age: 61
End: 2021-09-07

## 2021-09-07 NOTE — TELEPHONE ENCOUNTER
----- Message from Marilu Clemente, Med Ass't sent at 9/7/2021  3:16 PM PDT -----  Regarding: FW: lab Orders    ----- Message -----  From: Julissa Sellers, Med Ass't  Sent: 9/7/2021   2:45 PM PDT  To: Black Limon/Teja  Subject: lab Orders                                       Good Afternoon,    Patient believes she is to complete lab work before her next appointment with Dr Herman.  Thank you,  Julissa Sellers  Spec Patrick

## 2021-09-07 NOTE — TELEPHONE ENCOUNTER
Called pt in regards to lab work that was ordered at previous OV. LVM notifying that the lab slips will be mailed out to address on file.

## 2021-09-28 ENCOUNTER — TELEPHONE (OUTPATIENT)
Dept: ENDOCRINOLOGY | Facility: MEDICAL CENTER | Age: 61
End: 2021-09-28

## 2021-09-29 ENCOUNTER — TELEPHONE (OUTPATIENT)
Dept: CARDIOLOGY | Facility: MEDICAL CENTER | Age: 61
End: 2021-09-29

## 2021-10-01 ENCOUNTER — OFFICE VISIT (OUTPATIENT)
Dept: CARDIOLOGY | Facility: MEDICAL CENTER | Age: 61
End: 2021-10-01
Payer: COMMERCIAL

## 2021-10-01 VITALS
SYSTOLIC BLOOD PRESSURE: 132 MMHG | OXYGEN SATURATION: 96 % | HEIGHT: 68 IN | WEIGHT: 272.6 LBS | DIASTOLIC BLOOD PRESSURE: 78 MMHG | BODY MASS INDEX: 41.31 KG/M2 | RESPIRATION RATE: 16 BRPM | HEART RATE: 88 BPM

## 2021-10-01 DIAGNOSIS — R01.1 SYSTOLIC MURMUR: ICD-10-CM

## 2021-10-01 DIAGNOSIS — G47.33 OSA ON CPAP: ICD-10-CM

## 2021-10-01 DIAGNOSIS — I10 ESSENTIAL HYPERTENSION: ICD-10-CM

## 2021-10-01 DIAGNOSIS — E78.5 DYSLIPIDEMIA: ICD-10-CM

## 2021-10-01 DIAGNOSIS — E78.6 LOW HDL (UNDER 40): ICD-10-CM

## 2021-10-01 PROCEDURE — 99214 OFFICE O/P EST MOD 30 MIN: CPT | Performed by: INTERNAL MEDICINE

## 2021-10-01 RX ORDER — TRIAMCINOLONE ACETONIDE 1 MG/G
OINTMENT TOPICAL
COMMUNITY
Start: 2021-08-03 | End: 2023-01-12

## 2021-10-01 RX ORDER — AMOXICILLIN 875 MG/1
875 TABLET, COATED ORAL
COMMUNITY
Start: 2021-09-21 | End: 2023-01-12

## 2021-10-01 ASSESSMENT — ENCOUNTER SYMPTOMS
DIAPHORESIS: 0
HEADACHES: 0
IRREGULAR HEARTBEAT: 0
ORTHOPNEA: 0
PALPITATIONS: 0
VOMITING: 0
DIZZINESS: 1
SYNCOPE: 0
DECREASED APPETITE: 0
SLEEP DISTURBANCES DUE TO BREATHING: 0
NUMBNESS: 0
SHORTNESS OF BREATH: 0
ABDOMINAL PAIN: 0
NAUSEA: 0
WEAKNESS: 0
WHEEZING: 0
PARESTHESIAS: 0

## 2021-10-01 NOTE — PROGRESS NOTES
Cardiology Follow-up Consultation Note    Date of note:    10/1/2021    Primary Care Provider: Tee Cedeño M.D.    Name:             Nazia Lyle   YOB: 1960  MRN:               [id number]    Chief Complaint   Patient presents with   • Dyslipidemia     Dx: Dyslipidemia   • Hyperlipidemia     Dx: Hypercholesterolemia       HISTORY OF PRESENT ILLNESS  Ms. Nazia Lyle is a 61 y.o. female who returns to see us for follow-up of hypertension and hyperlipidemia.    Last clinic visit: 1/13/2021    Interim History:  Since our last visit, patient has been doing well.  Monitors her blood pressure at home with average BP 130s/70s.  Reports occasional dizziness with positional change but no episodes of presyncope or syncopal events.      Review of Systems   Constitutional: Negative for decreased appetite, diaphoresis and malaise/fatigue.   Cardiovascular: Negative for chest pain, irregular heartbeat, leg swelling, orthopnea, palpitations and syncope.   Respiratory: Negative for shortness of breath, sleep disturbances due to breathing and wheezing.    Gastrointestinal: Negative for abdominal pain, nausea and vomiting.   Neurological: Positive for dizziness. Negative for headaches, numbness, paresthesias and weakness.       Past Medical History:   Diagnosis Date   • Chickenpox    • Dyslipidemia 9/17/2012   • Portuguese measles    • History of kidney stones    • HTN (hypertension) 9/17/2012   • Hypothyroid    • Obesity 9/17/2012         Past Surgical History:   Procedure Laterality Date   • HARDWARE REMOVAL UPPER EXTREMITY Left 8/11/2015    Procedure: HARDWARE REMOVAL UPPER EXTREMITY;  Surgeon: Chaka Mckeon M.D.;  Location: SURGERY [facility];  Service:    • APPENDECTOMY     • CHOLECYSTECTOMY     • PRIMARY C SECTION     • TONSILLECTOMY     • TONSILLECTOMY           Current Outpatient Medications   Medication Sig Dispense Refill   • amoxicillin (AMOXIL) 875 MG tablet Take 875 mg by mouth. FOR  7 DAYS     • triamcinolone acetonide (KENALOG) 0.1 % Ointment apply to affected area twice a day for 1 week then if needed     • atorvastatin (LIPITOR) 40 MG Tab TAKE ONE TABLET BY MOUTH IN THE EVENING 90 Tablet 1   • SYNTHROID 112 MCG Tab TAKE ONE TABLET BY MOUTH EVERY MORNING ON AN EMPTY STOMACH 90 tablet 0   • chlorthalidone (HYGROTON) 25 MG Tab take 1 tablet by mouth once daily 90 tablet 2   • amLODIPine (NORVASC) 5 MG Tab take 1 tablet by mouth once daily 90 tablet 2   • VITAMIN D, CHOLECALCIFEROL, PO Take  by mouth.     • valsartan (DIOVAN) 320 MG tablet Take 1 Tab by mouth every day. (Patient taking differently: Take 320 mg by mouth every day. Taking at night) 90 Tab 3   • omega-3 acid ethyl esters (LOVAZA) 1 GM capsule Take 2 Caps by mouth 2 Times a Day. 360 Cap 3   • Multiple Vitamins-Minerals (MULTI COMPLETE PO) Take  by mouth.     • B Complex Vitamins (VITAMIN B COMPLEX PO) Take  by mouth.     • Probiotic Product (SOLUBLE FIBER/PROBIOTICS PO) Take  by mouth.       No current facility-administered medications for this visit.         Allergies   Allergen Reactions   • Latex Rash     rash   • Nkda [No Known Drug Allergy]          Family History   Problem Relation Age of Onset   • Thyroid Mother    • Diabetes Father    • Hypertension Father    • Diabetes Maternal Grandmother    • Diabetes Maternal Grandfather    • Heart Disease Paternal Grandmother    • Cancer Paternal Grandfather          Social History     Socioeconomic History   • Marital status:      Spouse name: Not on file   • Number of children: Not on file   • Years of education: Not on file   • Highest education level: Not on file   Occupational History   • Not on file   Tobacco Use   • Smoking status: Never Smoker   • Smokeless tobacco: Never Used   Vaping Use   • Vaping Use: Never used   Substance and Sexual Activity   • Alcohol use: No   • Drug use: No     Comment: , 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, works as human resource  "director   • Sexual activity: Not on file   Other Topics Concern   • Not on file   Social History Narrative   • Not on file     Social Determinants of Health     Financial Resource Strain:    • Difficulty of Paying Living Expenses:    Food Insecurity:    • Worried About Running Out of Food in the Last Year:    • Ran Out of Food in the Last Year:    Transportation Needs:    • Lack of Transportation (Medical):    • Lack of Transportation (Non-Medical):    Physical Activity:    • Days of Exercise per Week:    • Minutes of Exercise per Session:    Stress:    • Feeling of Stress :    Social Connections:    • Frequency of Communication with Friends and Family:    • Frequency of Social Gatherings with Friends and Family:    • Attends Orthodoxy Services:    • Active Member of Clubs or Organizations:    • Attends Club or Organization Meetings:    • Marital Status:    Intimate Partner Violence:    • Fear of Current or Ex-Partner:    • Emotionally Abused:    • Physically Abused:    • Sexually Abused:          Physical Exam:  Ambulatory Vitals  /78 (BP Location: Left arm, Patient Position: Sitting, BP Cuff Size: Adult)   Pulse 88   Resp 16   Ht 1.727 m (5' 8\")   Wt 124 kg (272 lb 9.6 oz)   SpO2 96%    Oxygen Therapy:  Pulse Oximetry: 96 %  BP Readings from Last 4 Encounters:   10/01/21 132/78   05/06/21 113/89   03/31/21 126/74   01/13/21 119/84       Weight/BMI: Body mass index is 41.45 kg/m².  Wt Readings from Last 4 Encounters:   10/01/21 124 kg (272 lb 9.6 oz)   05/06/21 119 kg (263 lb)   05/04/21 119 kg (263 lb)   03/31/21 121 kg (267 lb 12.8 oz)       GEN: Well developed, well nourished and in no acute distress.  HEART: no significant JVD, regular rate and rhythm, normal S1 and S2, systolic murmur best heard at RUSB, no third heart sounds, normal cardiac palpation  LUNG: clear to auscultation bilaterally, no wheezing, no crackles, normal respiratory effort on room air  ABDOMEN: soft, non-tender, non-distended, " normal bowel sounds throughout  EXTREMITIES: no peripheral edema noted  VASCULAR: no significantly elevated jugular venous pressure, no carotid bruits noted, radial pulses 2+ and equal      Outside lab Data Review 9/29/2021:  Sodium 140, potassium 3.3, BUN 14, creatinine 0.6  Hemoglobin A1c 6.1  Total cholesterol 159, HDL 28, triglyceride 212, LDL 97  TSH 6.32, free T4 1.18    Cardiac Imaging and Procedures Review:    Echo dated 12/9/2020:   CONCLUSIONS  Compared to the images of the prior study done on 05/17/2017, there is   slight increase in LV wall thickness.   Normal left ventricular chamber size and systolic function. Mild   concentric left ventricular hypertrophy.   Left ventricular ejection fraction is visually estimated to be 60-65%.   Normal right ventricular size and systolic function.  Trace tricuspid regurgitation.  Ascending aorta diameter is 3.7  cm.      Assessment & Plan     1. Essential hypertension     2. Dyslipidemia  Lipid Profile   3. Low HDL (under 40)  Lipid Profile   4. MADDI on CPAP     5. Systolic murmur  EC-ECHOCARDIOGRAM COMPLETE W/O CONT       Blood pressure under excellent control.  Continue chlorthalidone 25 mg, amlodipine 5 mg and valsartan 320 mg daily.    Reviewed updated lipid panel which shows low HDL and LDL within goal.  Continue atorvastatin 40 mg daily.  Patient advised to initiate aerobic exercise along with over-the-counter niacin supplementation.  Repeat lipid panel in 6 months.    Systolic murmur heard on physical exam today which was not present before.  Obtain transthoracic echocardiogram to further evaluate for valvular heart disease.  Suspect aortic stenosis.    Was found to have sleep apnea and has been on CPAP machine.  Unfortunately, her insurance denied CPAP.  Discussed with the patient that treating obstructive sleep apnea is pertinent to prevent future cardiac complications of uncontrolled hypertension, hypertensive heart disease, ACS and cardiac arrhythmia which  are all associated with untreated sleep apnea.  Encouraged patient to reach out to sleep medicine for possible peer to peer listing absolute importance of CPAP machine.      All of patient's excellent questions were answered to the best of my knowledge and to her satisfaction.  It was a pleasure seeing Ms. Nazia Lyle in my clinic today. Return in about 6 months (around 4/1/2022). Patient is aware to call the cardiology clinic with any questions or concerns.      Leon Herman MD  [facility], [facility]  [address] [address]  Phone: [phone]  Fax: [phone]    Please note that this dictation was created using voice recognition software. I have made every reasonable attempt to correct obvious errors, but it is possible there are errors of grammar and possibly content that I did not discover before finalizing the note.

## 2021-10-04 ENCOUNTER — OFFICE VISIT (OUTPATIENT)
Dept: ENDOCRINOLOGY | Facility: MEDICAL CENTER | Age: 61
End: 2021-10-04
Attending: INTERNAL MEDICINE
Payer: COMMERCIAL

## 2021-10-04 VITALS
BODY MASS INDEX: 41.07 KG/M2 | WEIGHT: 271 LBS | HEIGHT: 68 IN | SYSTOLIC BLOOD PRESSURE: 120 MMHG | OXYGEN SATURATION: 93 % | RESPIRATION RATE: 16 BRPM | HEART RATE: 96 BPM | DIASTOLIC BLOOD PRESSURE: 72 MMHG

## 2021-10-04 DIAGNOSIS — E03.9 ACQUIRED HYPOTHYROIDISM: ICD-10-CM

## 2021-10-04 DIAGNOSIS — R73.03 PREDIABETES: ICD-10-CM

## 2021-10-04 PROCEDURE — 99214 OFFICE O/P EST MOD 30 MIN: CPT | Performed by: INTERNAL MEDICINE

## 2021-10-04 PROCEDURE — 99211 OFF/OP EST MAY X REQ PHY/QHP: CPT | Performed by: INTERNAL MEDICINE

## 2021-10-04 RX ORDER — LEVOTHYROXINE SODIUM 125 MCG
125 TABLET ORAL
Qty: 90 TABLET | Refills: 2 | Status: SHIPPED | OUTPATIENT
Start: 2021-10-04 | End: 2022-08-02 | Stop reason: SDUPTHER

## 2021-10-05 NOTE — PROGRESS NOTES
Chief Complaint   Patient presents with   • Hypothyroidism   • Prediabetes        HPI:        Hypothyroidism reviewed  Dates back to about 2014.  Diagnosed on a blood test and started on thyroid elsewhere.  She has never had thyroid enlargement symptomatically.  No mention of Hashimoto's antibodies although I do not think those have been measured.        Continues to struggle with weight.  Dr. Reich had her on a good program but that is over now and she is started with a new weight control program          She has been taking brand Synthroid 112 mcg.  Does use a weekly pillbox organizer.  Did not tolerate Cytomel in the past.          Current free T4= 1.1 (0.7-2.1) and TSH elevated 6.3.  We will increase her dose to 125 and recheck in about 4 months.            In addition to her obesity she is also prediabetic at 6.1.  Best treatment will be weight loss but other medications are available that can help.  We will see what she gets out of her new metabolic clinic program.    ROS:  All other systems reported as negative or unchanged since last exam    Allergies:   Allergies   Allergen Reactions   • Latex Rash     rash   • Nkda [No Known Drug Allergy]        Current medicines including changes today:  Current Outpatient Medications   Medication Sig Dispense Refill   • atorvastatin (LIPITOR) 40 MG Tab TAKE ONE TABLET BY MOUTH IN THE EVENING 90 Tablet 1   • SYNTHROID 112 MCG Tab TAKE ONE TABLET BY MOUTH EVERY MORNING ON AN EMPTY STOMACH 90 tablet 0   • chlorthalidone (HYGROTON) 25 MG Tab take 1 tablet by mouth once daily 90 tablet 2   • amLODIPine (NORVASC) 5 MG Tab take 1 tablet by mouth once daily 90 tablet 2   • VITAMIN D, CHOLECALCIFEROL, PO Take  by mouth.     • valsartan (DIOVAN) 320 MG tablet Take 1 Tab by mouth every day. (Patient taking differently: Take 320 mg by mouth every day. Taking at night) 90 Tab 3   • Probiotic Product (SOLUBLE FIBER/PROBIOTICS PO) Take  by mouth.     • amoxicillin (AMOXIL) 875 MG  "tablet Take 875 mg by mouth. FOR 7 DAYS (Patient not taking: Reported on 10/4/2021)     • triamcinolone acetonide (KENALOG) 0.1 % Ointment apply to affected area twice a day for 1 week then if needed (Patient not taking: Reported on 10/4/2021)     • omega-3 acid ethyl esters (LOVAZA) 1 GM capsule Take 2 Caps by mouth 2 Times a Day. (Patient not taking: Reported on 10/4/2021) 360 Cap 3   • Multiple Vitamins-Minerals (MULTI COMPLETE PO) Take  by mouth. (Patient not taking: Reported on 10/4/2021)     • B Complex Vitamins (VITAMIN B COMPLEX PO) Take  by mouth. (Patient not taking: Reported on 10/4/2021)       No current facility-administered medications for this visit.        Past Medical History:   Diagnosis Date   • Chickenpox    • Dyslipidemia 9/17/2012   • Nepali measles    • History of kidney stones    • HTN (hypertension) 9/17/2012   • Hypothyroid    • Obesity 9/17/2012       PHYSICAL EXAM:    /72 (BP Location: Left arm, Patient Position: Sitting, BP Cuff Size: Large adult)   Pulse 96   Resp 16   Ht 1.727 m (5' 8\")   Wt 123 kg (271 lb)   SpO2 93%   BMI 41.21 kg/m²     Gen. obese but otherwise appears healthy.           No cushingoid features    Skin   appropriate for sex and age    HEENT  unremarkable    Neck   no palpable thyroid gland.  It is either atrophic or substernal.    Heart  regular    Extremities  no edema    Neuro  gait and station normal    Psych  appropriate    ASSESSMENT AND RECOMMENDATIONS    1. Acquired hypothyroidism            Unknown etiology although Hashimoto antibodies have not been measured.  No thyroid enlargement.           Increase Synthroid to 125 mcg and reevaluate in 3 to 4 months    2. Prediabetes            She has started a new weight loss program which will be the most effective thing she can do if successful.  Otherwise medical management might help.  Adjusting thyroid dose may be helpful.  Also Metformin and/or Ozempic can also be helpful       DISPOSITION: I will be " retiring from my practice at the end of this year.  I will establish care with another provider in our office      Augusto Lee M.D.    Copies to: Tee Cedeño M.D. [phone]

## 2021-10-27 ENCOUNTER — OFFICE VISIT (OUTPATIENT)
Dept: SLEEP MEDICINE | Facility: MEDICAL CENTER | Age: 61
End: 2021-10-27
Payer: COMMERCIAL

## 2021-10-27 VITALS
BODY MASS INDEX: 40.38 KG/M2 | HEIGHT: 68 IN | WEIGHT: 266.4 LBS | OXYGEN SATURATION: 93 % | DIASTOLIC BLOOD PRESSURE: 84 MMHG | RESPIRATION RATE: 16 BRPM | SYSTOLIC BLOOD PRESSURE: 124 MMHG | HEART RATE: 89 BPM

## 2021-10-27 DIAGNOSIS — G47.33 OSA (OBSTRUCTIVE SLEEP APNEA): ICD-10-CM

## 2021-10-27 PROCEDURE — 99213 OFFICE O/P EST LOW 20 MIN: CPT | Performed by: FAMILY MEDICINE

## 2021-10-27 NOTE — PROGRESS NOTES
[facility] Sleep Center Follow Up Note     Date: 10/27/2021 / Time: 11:36 AM    Patient ID:   Name:             Nazia Lyle   YOB: 1960  Age:                 61 y.o.  female   MRN:               [id number]      Thank you for requesting a sleep medicine consultation on Nazia Lyle at the sleep center. She presents today with the chief complaints of MADDI follow up.     HISTORY OF PRESENT ILLNESS:       Pt is currently on ACPAP 5-15 cm. She goes to sleep around 9:30-10:30 pm and wakes up around 5:30 am. She is getting about 7-8 hrs of sleep on a good night. Awakening has improved to only once a night. Overall, she does finds her sleep refreshing.She does not take regular naps.She denies any symptoms of RLS, narcolepsy or any symptoms to suggest parasomnias such as nightmares, sleep walking or acting out of dreams.She is using CPAP most days of the week. Pt reports 6+ hrs of average nightly use of CPAP. Pt denies snoring, gasping,choking.Pt also denies significant mask leak that is interfering with sleep. The 90 day compliance was downloaded which shows adequate compliance with more that 4 hr usage about 78%. The AHI is has improved to 4.7/hr. The mask leak is normal. The symptoms of Maddi has resolved with the therapy.       SLEEP HISTORY   Severe obstructive sleep apnea with  Respiratory Event Index (DANILO) of 60.6 per hour and worse in supine sleep with DANILO at 71.7. These findings are based on the recording time (flow evaluation time). O2 Sat. shree was 63%, avg O2 was 81 % and baseline O2 at 95 %. O2 sat was below 89% for 424 min of the flow evaluation time. Avg HR 63 BPM.         REVIEW OF SYSTEMS:       Constitutional: Denies fevers, Denies weight changes  Eyes: Denies changes in vision, no eye pain  Ears/Nose/Throat/Mouth: Denies nasal congestion or sore throat   Cardiovascular: Denies chest pain or palpitations   Respiratory: Denies shortness of breath , Denies  cough  Gastrointestinal/Hepatic: Denies abdominal pain, nausea  Skin/Breast: Denies rash,   Neurological: Denies headache, confusion, memory loss or focal weakness/parasthesias  Psychiatric: denies mood disorder   Sleep: denies snoring     Comprehensive review of systems form is reviewed with the patient and is attached in the EMR.     PMH:  has a past medical history of Chickenpox, Dyslipidemia (9/17/2012), Malay measles, History of kidney stones, HTN (hypertension) (9/17/2012), Hypothyroid, and Obesity (9/17/2012).  MEDS:   Current Outpatient Medications:   •  SYNTHROID 125 MCG Tab, Take 1 Tablet by mouth every morning on an empty stomach., Disp: 90 Tablet, Rfl: 2  •  atorvastatin (LIPITOR) 40 MG Tab, TAKE ONE TABLET BY MOUTH IN THE EVENING, Disp: 90 Tablet, Rfl: 1  •  chlorthalidone (HYGROTON) 25 MG Tab, take 1 tablet by mouth once daily, Disp: 90 tablet, Rfl: 2  •  amLODIPine (NORVASC) 5 MG Tab, take 1 tablet by mouth once daily, Disp: 90 tablet, Rfl: 2  •  VITAMIN D, CHOLECALCIFEROL, PO, Take  by mouth., Disp: , Rfl:   •  valsartan (DIOVAN) 320 MG tablet, Take 1 Tab by mouth every day., Disp: 90 Tab, Rfl: 3  •  Probiotic Product (SOLUBLE FIBER/PROBIOTICS PO), Take  by mouth., Disp: , Rfl:   •  amoxicillin (AMOXIL) 875 MG tablet, Take 875 mg by mouth. FOR 7 DAYS (Patient not taking: Reported on 10/4/2021), Disp: , Rfl:   •  triamcinolone acetonide (KENALOG) 0.1 % Ointment, apply to affected area twice a day for 1 week then if needed (Patient not taking: Reported on 10/4/2021), Disp: , Rfl:   •  SYNTHROID 112 MCG Tab, TAKE ONE TABLET BY MOUTH EVERY MORNING ON AN EMPTY STOMACH, Disp: 90 tablet, Rfl: 0  •  omega-3 acid ethyl esters (LOVAZA) 1 GM capsule, Take 2 Caps by mouth 2 Times a Day. (Patient not taking: Reported on 10/4/2021), Disp: 360 Cap, Rfl: 3  •  Multiple Vitamins-Minerals (MULTI COMPLETE PO), Take  by mouth. (Patient not taking: Reported on 10/4/2021), Disp: , Rfl:   •  B Complex Vitamins (VITAMIN B  "COMPLEX PO), Take  by mouth. (Patient not taking: Reported on 10/4/2021), Disp: , Rfl:   ALLERGIES:   Allergies   Allergen Reactions   • Latex Rash     rash   • Nkda [No Known Drug Allergy]      SURGHX:   Past Surgical History:   Procedure Laterality Date   • HARDWARE REMOVAL UPPER EXTREMITY Left 8/11/2015    Procedure: HARDWARE REMOVAL UPPER EXTREMITY;  Surgeon: Chaka Mckeon M.D.;  Location: SURGERY [facility];  Service:    • APPENDECTOMY     • CHOLECYSTECTOMY     • PRIMARY C SECTION     • TONSILLECTOMY     • TONSILLECTOMY       SOCHX:  reports that she has never smoked. She has never used smokeless tobacco. She reports that she does not drink alcohol and does not use drugs..  FH:   Family History   Problem Relation Age of Onset   • Thyroid Mother    • Diabetes Father    • Hypertension Father    • Diabetes Maternal Grandmother    • Diabetes Maternal Grandfather    • Heart Disease Paternal Grandmother    • Cancer Paternal Grandfather          Physical Exam:  Vitals/ General Appearance:   Weight/BMI: Body mass index is 40.51 kg/m².  Resp 16   Ht 1.727 m (5' 8\")   Wt 121 kg (266 lb 6.4 oz)   Vitals:    10/27/21 1131   Resp: 16   Weight: 121 kg (266 lb 6.4 oz)   Height: 1.727 m (5' 8\")       Pt. is alert and oriented to time, place and person. Cooperative and in no apparent distress.       Constitutional: Alert, no distress, well-groomed.  Skin: No rashes in visible areas.  Eye: Round. Conjunctiva clear, lids normal. No icterus.   ENMT: Lips pink without lesions, good dentition, moist mucous membranes. Phonation normal.  Neck: No masses, no thyromegaly. Moves freely without pain.  CV: Pulse as reported by patient  Respiratory: Unlabored respiratory effort, no cough or audible wheeze  Psych: Alert and oriented x3, normal affect and mood.     ASSESSMENT AND PLAN     1. Sleep Apnea      The pathophysiology of sleep anea and the increased risk of cardiovascular morbidity from untreated sleep apnea is discussed in " detail with the patient. She is urged to avoid supine sleep, weight gain and alcoholic beverages since all of these can worsen sleep apnea. She is cautioned against drowsy driving. If She feels sleepy while driving, She must pull over for a break/nap, rather than persist on the road, in the interest of She own safety and that of others on the road.   Plan   - Continue ACPAP 5-15 cm with dreamwear nasal mask    - compliance download was reviewed and discussed with the pt   - compliance was reinforced     2. Regarding treatment of other past medical problems and general health maintenance,  She is urged to follow up with PCP.

## 2021-10-29 DIAGNOSIS — I16.0 HYPERTENSIVE URGENCY: ICD-10-CM

## 2021-10-29 DIAGNOSIS — I10 ESSENTIAL HYPERTENSION: ICD-10-CM

## 2021-11-01 RX ORDER — VALSARTAN 320 MG/1
TABLET ORAL
Qty: 90 TABLET | Refills: 3 | Status: SHIPPED | OUTPATIENT
Start: 2021-11-01 | End: 2022-11-08

## 2021-11-12 ENCOUNTER — TELEPHONE (OUTPATIENT)
Dept: SCHEDULING | Facility: IMAGING CENTER | Age: 61
End: 2021-11-12

## 2021-11-12 NOTE — TELEPHONE ENCOUNTER
DA      Pt called and had some questions regarding the medications phentermine 37.5 mg and metformin 500 mg that were prescribed by Dr. Morales in [place], [place], a weight loss program pt is on. Pt is wanting to know if DA was okay with her taking these new medications.       Best contact for pt:  PH:[phone]      Thank you,  Ritu MAST

## 2021-11-13 NOTE — TELEPHONE ENCOUNTER
Please let her know that phentermine can sometimes cause palpitations and high blood pressure.  As long as she is keeping an eye on her symptoms and this is a short-term medication should be okay to start.  No concerns regarding Metformin.    Thanks.

## 2021-12-03 ENCOUNTER — HOSPITAL ENCOUNTER (OUTPATIENT)
Dept: CARDIOLOGY | Facility: MEDICAL CENTER | Age: 61
End: 2021-12-03
Attending: INTERNAL MEDICINE
Payer: COMMERCIAL

## 2021-12-03 DIAGNOSIS — R01.1 SYSTOLIC MURMUR: ICD-10-CM

## 2021-12-03 PROCEDURE — 93306 TTE W/DOPPLER COMPLETE: CPT

## 2021-12-06 LAB
LV EJECT FRACT  99904: 60
LV EJECT FRACT MOD 2C 99903: 56.45
LV EJECT FRACT MOD 4C 99902: 57.62
LV EJECT FRACT MOD BP 99901: 54.11

## 2021-12-06 PROCEDURE — 93306 TTE W/DOPPLER COMPLETE: CPT | Mod: 26 | Performed by: INTERNAL MEDICINE

## 2022-01-17 ENCOUNTER — OFFICE VISIT (OUTPATIENT)
Dept: SLEEP MEDICINE | Facility: MEDICAL CENTER | Age: 62
End: 2022-01-17
Payer: COMMERCIAL

## 2022-01-17 VITALS
BODY MASS INDEX: 39.37 KG/M2 | OXYGEN SATURATION: 94 % | HEIGHT: 68 IN | HEART RATE: 70 BPM | SYSTOLIC BLOOD PRESSURE: 128 MMHG | RESPIRATION RATE: 16 BRPM | DIASTOLIC BLOOD PRESSURE: 88 MMHG | WEIGHT: 259.8 LBS

## 2022-01-17 DIAGNOSIS — G47.33 OSA (OBSTRUCTIVE SLEEP APNEA): ICD-10-CM

## 2022-01-17 PROCEDURE — 99213 OFFICE O/P EST LOW 20 MIN: CPT | Performed by: FAMILY MEDICINE

## 2022-01-17 NOTE — PROGRESS NOTES
[facility] Follow Up Note     Date: 1/17/2022 / Time: 11:35 AM    Patient ID:   Name:             Nazia Lyle   YOB: 1960  Age:                 61 y.o.  female   MRN:               [id number]      Thank you for requesting a sleep medicine consultation on Nazia Lyle at the sleep center. She presents today with the chief complaints of MADDI follow up.     HISTORY OF PRESENT ILLNESS:       Pt is currently on ACPAP 5-15 cm with dreamwear nasal mask . She goes to sleep around 9:30 pm and wakes up around 5:30 am. She is getting about 7 hrs of sleep on a good night. The bad nights are rare per week. Overall, she does finds her sleep refreshing.She denies any symptoms of RLS, narcolepsy or any symptoms to suggest parasomnias such as nightmares, sleep walking or acting out of dreams.  She is using CPAP most days of the week. Pt reports 7 hrs of average nightly use of CPAP. Pt denies snoring, gasping,choking.Pt also denies significant mask leak that is interfering with sleep. The 30 day compliance was downloaded which shows adequate compliance with more that 4 hr usage about 96%. The AHI is has improved to 4.8/hr. The mask leak is normal. The symptoms of MADDI are well controlled with the therapy.     SLEEP HISTORY   Severe obstructive sleep apnea with  Respiratory Event Index (DANILO) of 60.6 per hour and worse in supine sleep with DANILO at 71.7. These findings are based on the recording time (flow evaluation time). O2 Sat. shree was 63%, avg O2 was 81 % and baseline O2 at 95 %. O2 sat was below 89% for 424 min of the flow evaluation time. Avg HR 63 BPM.      REVIEW OF SYSTEMS:       Constitutional: Denies fevers, Denies weight changes  Eyes: Denies changes in vision, no eye pain  Ears/Nose/Throat/Mouth: Denies nasal congestion or sore throat   Cardiovascular: Denies chest pain or palpitations   Respiratory: Denies shortness of breath , Denies cough  Gastrointestinal/Hepatic: Denies  abdominal pain, nausea, vomiting, diarrhea, constipation or GI bleeding   Genitourinary: Deniesdysuria or frequency  Musculoskeletal/Rheum: Denies  joint pain and swelling   Skin/Breast: Denies rash,   Neurological: Denies headache, confusion, memory loss or focal weakness/parasthesias  Psychiatric: denies mood disorder   Sleep: denies snoring     Comprehensive review of systems form is reviewed with the patient and is attached in the EMR.     PMH:  has a past medical history of Chickenpox, Dyslipidemia (9/17/2012), Montenegr[place] measles, History of kidney stones, HTN (hypertension) (9/17/2012), Hypothyroid, and Obesity (9/17/2012).  MEDS:   Current Outpatient Medications:   •  valsartan (DIOVAN) 320 MG tablet, take 1 tablet by mouth once daily, Disp: 90 Tablet, Rfl: 3  •  SYNTHROID 125 MCG Tab, Take 1 Tablet by mouth every morning on an empty stomach., Disp: 90 Tablet, Rfl: 2  •  atorvastatin (LIPITOR) 40 MG Tab, TAKE ONE TABLET BY MOUTH IN THE EVENING, Disp: 90 Tablet, Rfl: 1  •  chlorthalidone (HYGROTON) 25 MG Tab, take 1 tablet by mouth once daily, Disp: 90 tablet, Rfl: 2  •  amLODIPine (NORVASC) 5 MG Tab, take 1 tablet by mouth once daily, Disp: 90 tablet, Rfl: 2  •  VITAMIN D, CHOLECALCIFEROL, PO, Take  by mouth., Disp: , Rfl:   •  Probiotic Product (SOLUBLE FIBER/PROBIOTICS PO), Take  by mouth., Disp: , Rfl:   •  amoxicillin (AMOXIL) 875 MG tablet, Take 875 mg by mouth. FOR 7 DAYS (Patient not taking: Reported on 10/4/2021), Disp: , Rfl:   •  triamcinolone acetonide (KENALOG) 0.1 % Ointment, apply to affected area twice a day for 1 week then if needed (Patient not taking: Reported on 10/4/2021), Disp: , Rfl:   •  SYNTHROID 112 MCG Tab, TAKE ONE TABLET BY MOUTH EVERY MORNING ON AN EMPTY STOMACH, Disp: 90 tablet, Rfl: 0  •  omega-3 acid ethyl esters (LOVAZA) 1 GM capsule, Take 2 Caps by mouth 2 Times a Day. (Patient not taking: Reported on 10/4/2021), Disp: 360 Cap, Rfl: 3  •  Multiple Vitamins-Minerals (MULTI  "COMPLETE PO), Take  by mouth. (Patient not taking: Reported on 10/4/2021), Disp: , Rfl:   •  B Complex Vitamins (VITAMIN B COMPLEX PO), Take  by mouth. (Patient not taking: Reported on 10/4/2021), Disp: , Rfl:   ALLERGIES:   Allergies   Allergen Reactions   • Latex Rash     rash   • Nkda [No Known Drug Allergy]      SURGHX:   Past Surgical History:   Procedure Laterality Date   • HARDWARE REMOVAL UPPER EXTREMITY Left 8/11/2015    Procedure: HARDWARE REMOVAL UPPER EXTREMITY;  Surgeon: Chaka Mckeon M.D.;  Location: SURGERY [facility];  Service:    • APPENDECTOMY     • CHOLECYSTECTOMY     • PRIMARY C SECTION     • TONSILLECTOMY     • TONSILLECTOMY       SOCHX:  reports that she has never smoked. She has never used smokeless tobacco. She reports that she does not drink alcohol and does not use drugs..  FH:   Family History   Problem Relation Age of Onset   • Thyroid Mother    • Diabetes Father    • Hypertension Father    • Diabetes Maternal Grandmother    • Diabetes Maternal Grandfather    • Heart Disease Paternal Grandmother    • Cancer Paternal Grandfather          Physical Exam:  Vitals/ General Appearance:   Weight/BMI: Body mass index is 39.5 kg/m².  /88 (BP Location: Left arm, Patient Position: Sitting, BP Cuff Size: Adult)   Pulse 70   Resp 16   Ht 1.727 m (5' 8\")   Wt 118 kg (259 lb 12.8 oz)   SpO2 94%   Vitals:    01/17/22 1130   BP: 128/88   BP Location: Left arm   Patient Position: Sitting   BP Cuff Size: Adult   Pulse: 70   Resp: 16   SpO2: 94%   Weight: 118 kg (259 lb 12.8 oz)   Height: 1.727 m (5' 8\")       Pt. is alert and oriented to time, place and person. Cooperative and in no apparent distress.       Constitutional: Alert, no distress, well-groomed.  Skin: No rashes in visible areas.  Eye: Round. Conjunctiva clear, lids normal. No icterus.   ENMT: Lips pink without lesions, good dentition, moist mucous membranes. Phonation normal.  Neck: No masses, no thyromegaly. Moves freely without " pain.  CV: Pulse as reported by patient  Respiratory: Unlabored respiratory effort, no cough or audible wheeze  Psych: Alert and oriented x3, normal affect and mood.     ASSESSMENT AND PLAN     1.Obstructive Sleep Apnea: The pathophysiology of sleep anea and the increased risk of cardiovascular morbidity from untreated sleep apnea is discussed in detail with the patient. She is urged to avoid supine sleep, weight gain and alcoholic beverages since all of these can worsen sleep apnea. She is cautioned against drowsy driving. If She feels sleepy while driving, She must pull over for a break/nap, rather than persist on the road, in the interest of She own safety and that of others on the road.   Plan   - Continue ACPAP 5-15 cm with dreamwear nasal mask    - Supplies are refilled    - Compliance download was reviewed and discussed with the pt   - Compliance was reinforced     2. Regarding treatment of other past medical problems and general health maintenance,  She is urged to follow up with PCP.

## 2022-01-30 DIAGNOSIS — I16.0 HYPERTENSIVE URGENCY: ICD-10-CM

## 2022-02-03 RX ORDER — CHLORTHALIDONE 25 MG/1
TABLET ORAL
Qty: 90 TABLET | Refills: 2 | Status: SHIPPED | OUTPATIENT
Start: 2022-02-03 | End: 2022-11-08

## 2022-02-03 RX ORDER — AMLODIPINE BESYLATE 5 MG/1
TABLET ORAL
Qty: 90 TABLET | Refills: 2 | Status: SHIPPED | OUTPATIENT
Start: 2022-02-03 | End: 2022-11-08

## 2022-02-11 ENCOUNTER — APPOINTMENT (RX ONLY)
Dept: URBAN - METROPOLITAN AREA CLINIC 20 | Facility: CLINIC | Age: 62
Setting detail: DERMATOLOGY
End: 2022-02-11

## 2022-02-11 DIAGNOSIS — Z41.9 ENCOUNTER FOR PROCEDURE FOR PURPOSES OTHER THAN REMEDYING HEALTH STATE, UNSPECIFIED: ICD-10-CM

## 2022-02-11 PROCEDURE — ? COSMETIC CONSULTATION: BBL

## 2022-02-11 ASSESSMENT — LOCATION DETAILED DESCRIPTION DERM
LOCATION DETAILED: LEFT MEDIAL FOREHEAD
LOCATION DETAILED: LEFT INFERIOR CENTRAL MALAR CHEEK

## 2022-02-11 ASSESSMENT — LOCATION ZONE DERM: LOCATION ZONE: FACE

## 2022-02-11 ASSESSMENT — LOCATION SIMPLE DESCRIPTION DERM
LOCATION SIMPLE: LEFT CHEEK
LOCATION SIMPLE: LEFT FOREHEAD

## 2022-02-24 ENCOUNTER — TELEPHONE (OUTPATIENT)
Dept: SLEEP MEDICINE | Facility: MEDICAL CENTER | Age: 62
End: 2022-02-24
Payer: COMMERCIAL

## 2022-03-18 ENCOUNTER — APPOINTMENT (RX ONLY)
Dept: URBAN - METROPOLITAN AREA CLINIC 20 | Facility: CLINIC | Age: 62
Setting detail: DERMATOLOGY
End: 2022-03-18

## 2022-03-18 DIAGNOSIS — Z41.9 ENCOUNTER FOR PROCEDURE FOR PURPOSES OTHER THAN REMEDYING HEALTH STATE, UNSPECIFIED: ICD-10-CM

## 2022-03-18 PROCEDURE — ? SCITON BBL

## 2022-03-18 ASSESSMENT — LOCATION SIMPLE DESCRIPTION DERM
LOCATION SIMPLE: LEFT CHEEK
LOCATION SIMPLE: RIGHT CHEEK
LOCATION SIMPLE: SUPERIOR FOREHEAD
LOCATION SIMPLE: CHIN

## 2022-03-18 ASSESSMENT — LOCATION DETAILED DESCRIPTION DERM
LOCATION DETAILED: RIGHT INFERIOR CENTRAL MALAR CHEEK
LOCATION DETAILED: LEFT INFERIOR CENTRAL MALAR CHEEK
LOCATION DETAILED: RIGHT CHIN
LOCATION DETAILED: SUPERIOR MID FOREHEAD

## 2022-03-18 ASSESSMENT — LOCATION ZONE DERM: LOCATION ZONE: FACE

## 2022-03-18 NOTE — PROCEDURE: SCITON BBL
Cooling ?: Yes
Cooling (In C): 15
Indication Override (Will Not Show Above If Text Entered): Vascular
Repetition Rate (Hz): 10
Post Procedure Text: The patient tolerated the procedure well. Ice-chilled washclothes were applied to the treatment area for comfort. Post care was reviewed with the patient.
Detail Level: Zone
Fluence (J/Cm2): 21
Fluence (J/Cm2): 8
Spot Size: Finesse Adapter Size: 7 mm round
Pulse Duration Units: milliseconds
Passes: 1
Spot Size: Finesse Adapter Size: 15 x 45 mm (No Finesse Adapter)
Fluence (J/Cm2): 12
Cooling (In C): 20
Fluence (J/Cm2): 25
Additional Comments (Optional): same setting using 15x15
Fluence (J/Cm2): 14
Price (Use Numbers Only, No Special Characters Or $): 060
Consent: Written consent obtained, risks reviewed including but not limited to crusting, scabbing, blistering, scarring, darker or lighter pigmentary change, bruising, and/or incomplete response.
Post-Care Instructions: I reviewed with the patient in detail post-care instructions. Patient should stay away from the sun and wear sun protection until treated areas are fully healed.
Spot Size: Finesse Adapter Size: 15 x 15 mm square
Anesthesia Volume In Cc: 0
Preprocedure Text: The treatment areas were thoroughly cleaned. Any exposed at risk hair that was not to be treated was covered in white paper tape. Clear ultrasound gel was applied to the treatment area. The area was treated with no immediate stacking of pulses.
Hide Repetition Rate?: No

## 2022-04-05 ENCOUNTER — APPOINTMENT (RX ONLY)
Dept: URBAN - METROPOLITAN AREA CLINIC 20 | Facility: CLINIC | Age: 62
Setting detail: DERMATOLOGY
End: 2022-04-05

## 2022-04-05 DIAGNOSIS — L81.4 OTHER MELANIN HYPERPIGMENTATION: ICD-10-CM

## 2022-04-05 DIAGNOSIS — D22 MELANOCYTIC NEVI: ICD-10-CM

## 2022-04-05 DIAGNOSIS — Z85.828 PERSONAL HISTORY OF OTHER MALIGNANT NEOPLASM OF SKIN: ICD-10-CM

## 2022-04-05 DIAGNOSIS — Z71.89 OTHER SPECIFIED COUNSELING: ICD-10-CM

## 2022-04-05 DIAGNOSIS — D18.0 HEMANGIOMA: ICD-10-CM

## 2022-04-05 DIAGNOSIS — L82.1 OTHER SEBORRHEIC KERATOSIS: ICD-10-CM

## 2022-04-05 PROBLEM — D22.72 MELANOCYTIC NEVI OF LEFT LOWER LIMB, INCLUDING HIP: Status: ACTIVE | Noted: 2022-04-05

## 2022-04-05 PROBLEM — D18.01 HEMANGIOMA OF SKIN AND SUBCUTANEOUS TISSUE: Status: ACTIVE | Noted: 2022-04-05

## 2022-04-05 PROBLEM — D22.62 MELANOCYTIC NEVI OF LEFT UPPER LIMB, INCLUDING SHOULDER: Status: ACTIVE | Noted: 2022-04-05

## 2022-04-05 PROBLEM — D22.5 MELANOCYTIC NEVI OF TRUNK: Status: ACTIVE | Noted: 2022-04-05

## 2022-04-05 PROBLEM — D22.61 MELANOCYTIC NEVI OF RIGHT UPPER LIMB, INCLUDING SHOULDER: Status: ACTIVE | Noted: 2022-04-05

## 2022-04-05 PROBLEM — D22.71 MELANOCYTIC NEVI OF RIGHT LOWER LIMB, INCLUDING HIP: Status: ACTIVE | Noted: 2022-04-05

## 2022-04-05 PROCEDURE — ? COUNSELING

## 2022-04-05 PROCEDURE — ? DIAGNOSIS COMMENT

## 2022-04-05 PROCEDURE — 99213 OFFICE O/P EST LOW 20 MIN: CPT

## 2022-04-05 ASSESSMENT — LOCATION DETAILED DESCRIPTION DERM
LOCATION DETAILED: LEFT MID-UPPER BACK
LOCATION DETAILED: LEFT VENTRAL DISTAL FOREARM
LOCATION DETAILED: EPIGASTRIC SKIN
LOCATION DETAILED: RIGHT VENTRAL DISTAL FOREARM
LOCATION DETAILED: LEFT INFERIOR CENTRAL MALAR CHEEK
LOCATION DETAILED: LEFT PROXIMAL DORSAL FOREARM
LOCATION DETAILED: LEFT ANTERIOR DISTAL THIGH
LOCATION DETAILED: RIGHT PROXIMAL DORSAL FOREARM
LOCATION DETAILED: LEFT DISTAL POSTERIOR THIGH
LOCATION DETAILED: RIGHT POSTERIOR ANKLE
LOCATION DETAILED: LEFT SUPERIOR MEDIAL MIDBACK
LOCATION DETAILED: LEFT VENTRAL PROXIMAL FOREARM
LOCATION DETAILED: LEFT ANTERIOR DISTAL UPPER ARM
LOCATION DETAILED: RIGHT INFERIOR MEDIAL UPPER BACK
LOCATION DETAILED: RIGHT DISTAL POSTERIOR THIGH
LOCATION DETAILED: RIGHT PROXIMAL PRETIBIAL REGION
LOCATION DETAILED: RIGHT VENTRAL PROXIMAL FOREARM
LOCATION DETAILED: RIGHT NASAL SIDEWALL
LOCATION DETAILED: RIGHT CENTRAL PARIETAL SCALP
LOCATION DETAILED: LEFT PROXIMAL PRETIBIAL REGION
LOCATION DETAILED: RIGHT ANTERIOR DISTAL UPPER ARM
LOCATION DETAILED: LEFT PROXIMAL POSTERIOR THIGH
LOCATION DETAILED: RIGHT PROXIMAL POSTERIOR THIGH
LOCATION DETAILED: RIGHT ANTERIOR DISTAL THIGH

## 2022-04-05 ASSESSMENT — LOCATION SIMPLE DESCRIPTION DERM
LOCATION SIMPLE: RIGHT UPPER ARM
LOCATION SIMPLE: RIGHT ANKLE
LOCATION SIMPLE: LEFT THIGH
LOCATION SIMPLE: LEFT UPPER BACK
LOCATION SIMPLE: LEFT POSTERIOR THIGH
LOCATION SIMPLE: RIGHT PRETIBIAL REGION
LOCATION SIMPLE: RIGHT FOREARM
LOCATION SIMPLE: ABDOMEN
LOCATION SIMPLE: RIGHT POSTERIOR THIGH
LOCATION SIMPLE: RIGHT THIGH
LOCATION SIMPLE: LEFT CHEEK
LOCATION SIMPLE: LEFT LOWER BACK
LOCATION SIMPLE: LEFT FOREARM
LOCATION SIMPLE: LEFT UPPER ARM
LOCATION SIMPLE: LEFT PRETIBIAL REGION
LOCATION SIMPLE: RIGHT NOSE
LOCATION SIMPLE: RIGHT UPPER BACK
LOCATION SIMPLE: SCALP

## 2022-04-05 ASSESSMENT — LOCATION ZONE DERM
LOCATION ZONE: ARM
LOCATION ZONE: SCALP
LOCATION ZONE: LEG
LOCATION ZONE: TRUNK
LOCATION ZONE: FACE
LOCATION ZONE: NOSE

## 2022-04-05 NOTE — PROCEDURE: DIAGNOSIS COMMENT
Comment: Counseled in seeing cosmetics for laser treatment.
Detail Level: Simple
Render Risk Assessment In Note?: no

## 2022-04-22 ENCOUNTER — APPOINTMENT (RX ONLY)
Dept: URBAN - METROPOLITAN AREA CLINIC 20 | Facility: CLINIC | Age: 62
Setting detail: DERMATOLOGY
End: 2022-04-22

## 2022-04-22 DIAGNOSIS — Z41.9 ENCOUNTER FOR PROCEDURE FOR PURPOSES OTHER THAN REMEDYING HEALTH STATE, UNSPECIFIED: ICD-10-CM

## 2022-04-22 PROCEDURE — ? SCITON BBL

## 2022-04-22 PROCEDURE — ? LASER VASCULAR LESION (COSMETIC)

## 2022-04-22 ASSESSMENT — LOCATION ZONE DERM
LOCATION ZONE: NOSE
LOCATION ZONE: FACE

## 2022-04-22 ASSESSMENT — LOCATION DETAILED DESCRIPTION DERM
LOCATION DETAILED: RIGHT CHIN
LOCATION DETAILED: LEFT NASAL ALA
LOCATION DETAILED: RIGHT INFERIOR CENTRAL MALAR CHEEK
LOCATION DETAILED: LEFT INFERIOR CENTRAL MALAR CHEEK
LOCATION DETAILED: RIGHT NASAL ALA
LOCATION DETAILED: SUPERIOR MID FOREHEAD

## 2022-04-22 ASSESSMENT — LOCATION SIMPLE DESCRIPTION DERM
LOCATION SIMPLE: RIGHT CHEEK
LOCATION SIMPLE: LEFT NOSE
LOCATION SIMPLE: CHIN
LOCATION SIMPLE: LEFT CHEEK
LOCATION SIMPLE: SUPERIOR FOREHEAD
LOCATION SIMPLE: RIGHT NOSE

## 2022-04-22 NOTE — PROCEDURE: LASER VASCULAR LESION (COSMETIC)
Laser Type: [id number]
Delay Time (Ms): 10
External Cooling Fan Speed: 5
Cryogen Time (Ms): 30
Add Additional Setting ?: No
Spot Size: 3 mm
Pulse Duration: 10 ms
Fluence: 8
Spot Size: 7 mm
Fluence: 12
Delay Time (Ms): 20
Detail Level: Zone
Consent: Written consent obtained, risks reviewed including but not limited to crusting, scabbing, blistering, scarring, darker or lighter pigmentary change, incidental hair removal, bruising, and/or incomplete removal.
Post Procedure Text: Vaseline and ice applied. Post care reviewed with patient.
Post-Care Instructions: I reviewed with the patient in detail post-care instructions. Patient should stay away from the sun and wear sun protection until treated areas are fully healed.

## 2022-04-22 NOTE — PROCEDURE: SCITON BBL
Cooling ?: Yes
Cooling (In C): 15
Indication Override (Will Not Show Above If Text Entered): Vascular
Repetition Rate (Hz): 10
Post Procedure Text: The patient tolerated the procedure well. Ice-chilled washclothes were applied to the treatment area for comfort. Post care was reviewed with the patient.
Pulse Duration: 20
Detail Level: Zone
Fluence (J/Cm2): 9
Fluence (J/Cm2): 8
Location Override (Will Not Show Above If Text Entered): face Spot Treat
Spot Size: Finesse Adapter Size: 15 x 45 mm (No Finesse Adapter)
Pulse Duration Units: milliseconds
Passes: 1
Spot Size: Finesse Adapter Size: 7 mm round
Fluence (J/Cm2): 25
Additional Comments (Optional): same setting using 15x15
Fluence (J/Cm2): 12
Price (Use Numbers Only, No Special Characters Or $): 450
Treatment Number: 2
Consent: Written consent obtained, risks reviewed including but not limited to crusting, scabbing, blistering, scarring, darker or lighter pigmentary change, bruising, and/or incomplete response.
Post-Care Instructions: I reviewed with the patient in detail post-care instructions. Patient should stay away from the sun and wear sun protection until treated areas are fully healed.
Spot Size: Finesse Adapter Size: 15 x 15 mm square
Anesthesia Volume In Cc: 0
Preprocedure Text: The treatment areas were thoroughly cleaned. Any exposed at risk hair that was not to be treated was covered in white paper tape. Clear ultrasound gel was applied to the treatment area. The area was treated with no immediate stacking of pulses.
Hide Repetition Rate?: No
Cooling (In C): 22

## 2022-07-07 ENCOUNTER — APPOINTMENT (RX ONLY)
Dept: URBAN - METROPOLITAN AREA CLINIC 20 | Facility: CLINIC | Age: 62
Setting detail: DERMATOLOGY
End: 2022-07-07

## 2022-07-07 DIAGNOSIS — Z41.9 ENCOUNTER FOR PROCEDURE FOR PURPOSES OTHER THAN REMEDYING HEALTH STATE, UNSPECIFIED: ICD-10-CM

## 2022-07-07 PROCEDURE — ? FRAXEL

## 2022-07-07 ASSESSMENT — LOCATION ZONE DERM: LOCATION ZONE: FACE

## 2022-07-07 ASSESSMENT — LOCATION DETAILED DESCRIPTION DERM: LOCATION DETAILED: LEFT CENTRAL MALAR CHEEK

## 2022-07-07 ASSESSMENT — LOCATION SIMPLE DESCRIPTION DERM: LOCATION SIMPLE: LEFT CHEEK

## 2022-07-07 NOTE — HPI: OTHER
Condition:: Redness and hyperpigmentation, sun damage.
Please Describe Your Condition:: Wears sunscreen daily.\\nNot prone to cold sores.

## 2022-07-07 NOTE — PROCEDURE: FRAXEL
Small Plastic Eye Shield Text: The ocular mucosa was anesthetized with tetracaine. Once adequate anesthesia was optained, small plastic eye shields were inserted and remained in place until the procedure was completed.
Indication: photodamage
Number Of Passes: 4
Energy(Mj/Cm2): 25
Energy(Mj/Cm2): 1
Consent: Written consent obtained, risks reviewed including but not limited to pain and incomplete improvement.
Medium Plastic Eye Shield Text: The ocular mucosa was anesthetized with tetracaine. Once adequate anesthesia was optained, medium plastic eye shields were inserted and remained in place until the procedure was completed.
Add Post-Care Below To The Note: No
Location: full face
Energy(Mj/Cm2): 20
Total Coverage: 14%
Total Coverage: 35%
Large Plastic Eye Shield Text: The ocular mucosa was anesthetized with tetracaine. Once adequate anesthesia was optained, large plastic eye shields were inserted and remained in place until the procedure was completed.
External Cooling: Olinda Cryo 5
Topical Anesthesia Type: 23% lidocaine, 7% tetracaine
Post-Care Instructions: I reviewed with the patient in detail post-care instructions. Patient should avoid sun until area fully healed.
Location: forehead
Was An Eye Shield Used?: Yes - Medium (Metal)
Location: decolletage of the chest
Small Metal Eye Shield Text: The ocular mucosa was anesthetized with tetracaine. Once adequate anesthesia was optained, small metal eye shields were inserted and remained in place until the procedure was completed.
External Cooling Fan Speed: 6
Length Of Topical Anesthesia Application (Optional): 60 minutes
Location: neck
Medium Metal Eye Shield Text: The ocular mucosa was anesthetized with tetracaine. Once adequate anesthesia was optained, medium metal eye shields were inserted and remained in place until the procedure was completed.
Location: dorsal forearms
Price (Use Numbers Only, No Special Characters Or $): 330.00
Treatment Level: 5
Large Metal Eye Shield Text: The ocular mucosa was anesthetized with tetracaine. Once adequate anesthesia was optained, large metal eye shields were inserted and remained in place until the procedure was completed.
Wavelength: 1927nm
Detail Level: Simple

## 2022-08-02 DIAGNOSIS — E03.9 ACQUIRED HYPOTHYROIDISM: ICD-10-CM

## 2022-08-03 RX ORDER — LEVOTHYROXINE SODIUM 125 MCG
125 TABLET ORAL
Qty: 90 TABLET | Refills: 1 | Status: SHIPPED | OUTPATIENT
Start: 2022-08-03 | End: 2023-03-23 | Stop reason: SDUPTHER

## 2022-09-15 NOTE — PROCEDURE: ADDITIONAL NOTES
Detail Level: Detailed
Detail Level: Simple
Additional Notes: Recommend pt stop curology - contains tretinoin\\nGentle skin products only\\nStart metronidazole\\nAvoid topical steroid to face

## 2022-09-23 ENCOUNTER — APPOINTMENT (RX ONLY)
Dept: URBAN - METROPOLITAN AREA CLINIC 36 | Facility: CLINIC | Age: 62
Setting detail: DERMATOLOGY
End: 2022-09-23

## 2022-09-23 DIAGNOSIS — Z41.9 ENCOUNTER FOR PROCEDURE FOR PURPOSES OTHER THAN REMEDYING HEALTH STATE, UNSPECIFIED: ICD-10-CM

## 2022-09-23 PROCEDURE — ? BOTOX

## 2022-09-23 NOTE — PROCEDURE: BOTOX
Expiration Date (Month Year): 04/2025
Left Pupillary Line Units: 0
Show Additional Area 2: Yes
Post-Care Instructions: Patient instructed to not lie down for 4 hours and limit physical activity for 24 hours. Patient instructed not to travel by airplane for 48 hours.
Detail Level: Detailed
Additional Area 3 Location: masseter
Price (Use Numbers Only, No Special Characters Or $): 0.00
Show Lcl Units: No
Lot #: [id number]
Glabellar Complex Units: 20
Consent: Written consent obtained. Risks include but not limited to lid/brow ptosis, bruising, swelling, diplopia, temporary effect, incomplete chemical denervation.
Forehead Units: 10
Dilution (U/0.1 Cc): 0.6
Additional Area 1 Location: upper lip
Depressor Anguli Oris Units: 6
Additional Area 2 Location: chin
Periorbital Skin Units: 14

## 2022-11-05 DIAGNOSIS — I16.0 HYPERTENSIVE URGENCY: ICD-10-CM

## 2022-11-05 DIAGNOSIS — I10 ESSENTIAL HYPERTENSION: ICD-10-CM

## 2022-11-07 NOTE — TELEPHONE ENCOUNTER
Is the patient due for a refill? Yes    Was the patient seen the past year? Yes    Date of last office visit: 10/1/21    Does the patient have an upcoming appointment?  No   If yes, When?     Provider to refill:DA    Does the patients insurance require a 100 day supply?  No

## 2022-11-08 RX ORDER — AMLODIPINE BESYLATE 5 MG/1
TABLET ORAL
Qty: 90 TABLET | Refills: 0 | Status: SHIPPED | OUTPATIENT
Start: 2022-11-08 | End: 2023-02-10

## 2022-11-08 RX ORDER — VALSARTAN 320 MG/1
TABLET ORAL
Qty: 90 TABLET | Refills: 0 | Status: SHIPPED | OUTPATIENT
Start: 2022-11-08 | End: 2023-02-10

## 2022-11-08 RX ORDER — CHLORTHALIDONE 25 MG/1
TABLET ORAL
Qty: 90 TABLET | Refills: 0 | Status: SHIPPED | OUTPATIENT
Start: 2022-11-08 | End: 2023-02-10

## 2022-11-15 ENCOUNTER — APPOINTMENT (OUTPATIENT)
Dept: ENDOCRINOLOGY | Facility: MEDICAL CENTER | Age: 62
End: 2022-11-15
Payer: COMMERCIAL

## 2022-12-17 DIAGNOSIS — E78.5 DYSLIPIDEMIA: ICD-10-CM

## 2022-12-19 RX ORDER — ATORVASTATIN CALCIUM 40 MG/1
TABLET, FILM COATED ORAL
Qty: 90 TABLET | Refills: 0 | Status: SHIPPED | OUTPATIENT
Start: 2022-12-19 | End: 2023-04-03 | Stop reason: SDUPTHER

## 2022-12-19 NOTE — TELEPHONE ENCOUNTER
Is the patient due for a refill? Yes    Was the patient seen the past year? No    Date of last office visit: 10/1/21    Does the patient have an upcoming appointment?  Yes   If yes, When? 1/12/23    Provider to refill:DINO    Does the patients insurance require a 100 day supply?  No

## 2023-01-12 ENCOUNTER — TELEMEDICINE (OUTPATIENT)
Dept: CARDIOLOGY | Facility: MEDICAL CENTER | Age: 63
End: 2023-01-12
Payer: COMMERCIAL

## 2023-01-12 VITALS
WEIGHT: 252 LBS | SYSTOLIC BLOOD PRESSURE: 137 MMHG | HEIGHT: 68 IN | BODY MASS INDEX: 38.19 KG/M2 | DIASTOLIC BLOOD PRESSURE: 79 MMHG

## 2023-01-12 DIAGNOSIS — K75.81 NONALCOHOLIC STEATOHEPATITIS (NASH): ICD-10-CM

## 2023-01-12 DIAGNOSIS — I10 ESSENTIAL HYPERTENSION: ICD-10-CM

## 2023-01-12 DIAGNOSIS — Z79.899 HIGH RISK MEDICATION USE: ICD-10-CM

## 2023-01-12 DIAGNOSIS — E78.5 DYSLIPIDEMIA: ICD-10-CM

## 2023-01-12 DIAGNOSIS — E66.01 CLASS 2 SEVERE OBESITY DUE TO EXCESS CALORIES WITH SERIOUS COMORBIDITY AND BODY MASS INDEX (BMI) OF 38.0 TO 38.9 IN ADULT (HCC): ICD-10-CM

## 2023-01-12 PROCEDURE — 99214 OFFICE O/P EST MOD 30 MIN: CPT | Mod: 95 | Performed by: INTERNAL MEDICINE

## 2023-01-12 RX ORDER — CIDER VINEGAR 300 MG
TABLET ORAL DAILY
COMMUNITY
Start: 2021-05-01

## 2023-01-12 RX ORDER — ASCORBIC ACID 1500 MG
TABLET, EXTENDED RELEASE ORAL DAILY
COMMUNITY
Start: 2022-09-01

## 2023-01-12 NOTE — PROGRESS NOTES
Cardiology Telemedicine Visit Follow-up Consultation Note    Date of note:    1/12/2023    Primary Care Provider: Tee Cedeño M.D.    Name:             Nazia Lyle   YOB: 1960  MRN:               [id number]      This evaluation was conducted via Zoom, using secure and encrypted videoconferencing technology.  The patient was at home in the state [facility].  The patient's identity was confirmed and verbal consent for the telemedicine encounter was obtained.       Chief Complaint   Patient presents with    Hypertension    Dyslipidemia       HISTORY OF PRESENT ILLNESS  Ms. Nazia Lyle is a 60 y.o. female who returns to see us for follow-up     Pertinent History:  Essential HTN   Dyslipidemia  TELLES  Obesity with BMI 38.3     Last clinic visit: 10/1/2021    Interim History:  Since her last visit, patient has had a rough 2022.  Mother had a stroke end of May 2022 with significant residual deficits.  Unfortunately, she lost her only brother from COVID infection who lives in [place].  Mother recently passed on 12/21/2022 from complications of stroke.     From a cardiac perspective, denies any concerning symptoms of chest pain, pressure, palpitations or elevated blood pressure with recent stressful events.  Is taking medication on a regular basis without any side effects.    For weight loss, is working with a  with focus on dietary and lifestyle modifications.      Past Medical History:   Diagnosis Date    Chickenpox     Dyslipidemia 9/17/2012    [place] measles     History of kidney stones     HTN (hypertension) 9/17/2012    Hypothyroid     Obesity 9/17/2012         Past Surgical History:   Procedure Laterality Date    HARDWARE REMOVAL UPPER EXTREMITY Left 8/11/2015    Procedure: HARDWARE REMOVAL UPPER EXTREMITY;  Surgeon: Chaka Mckeon M.D.;  Location: SURGERY [facility];  Service:     APPENDECTOMY      CHOLECYSTECTOMY      PRIMARY C SECTION      TONSILLECTOMY       TONSILLECTOMY           Current Outpatient Medications   Medication Sig Dispense Refill    Ascorbic Acid (VITAMIN C CR) 1500 MG Tab CR       Colostrum 500 MG Cap       atorvastatin (LIPITOR) 40 MG Tab TAKE ONE TABLET BY MOUTH EVERY EVENING 90 Tablet 0    valsartan (DIOVAN) 320 MG tablet take 1 tablet by mouth once daily 90 Tablet 0    chlorthalidone (HYGROTON) 25 MG Tab take 1 tablet by mouth once daily 90 Tablet 0    amLODIPine (NORVASC) 5 MG Tab take 1 tablet by mouth once daily 90 Tablet 0    SYNTHROID 125 MCG Tab Take 1 Tablet by mouth every morning on an empty stomach. 90 Tablet 1    VITAMIN D, CHOLECALCIFEROL, PO Take  by mouth.      Multiple Vitamins-Minerals (MULTI COMPLETE PO) Take  by mouth.      B Complex Vitamins (VITAMIN B COMPLEX PO) Take  by mouth.      Probiotic Product (SOLUBLE FIBER/PROBIOTICS PO) Take  by mouth.      omega-3 acid ethyl esters (LOVAZA) 1 GM capsule Take 2 Caps by mouth 2 Times a Day. (Patient not taking: Reported on 10/4/2021) 360 Cap 3     No current facility-administered medications for this visit.         Allergies   Allergen Reactions    Latex Rash     rash    Nkda [No Known Drug Allergy]          Family History   Problem Relation Age of Onset    Thyroid Mother     Diabetes Father     Hypertension Father     Diabetes Maternal Grandmother     Diabetes Maternal Grandfather     Heart Disease Paternal Grandmother     Cancer Paternal Grandfather          Social History     Socioeconomic History    Marital status:      Spouse name: Not on file    Number of children: Not on file    Years of education: Not on file    Highest education level: Not on file   Occupational History    Not on file   Tobacco Use    Smoking status: Never    Smokeless tobacco: Never   Vaping Use    Vaping Use: Never used   Substance and Sexual Activity    Alcohol use: No    Drug use: No     Comment: , 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, works as human     Sexual activity: Not  "on file   Other Topics Concern    Not on file   Social History Narrative    Not on file     Social Determinants of Health     Financial Resource Strain: Not on file   Food Insecurity: Not on file   Transportation Needs: Not on file   Physical Activity: Not on file   Stress: Not on file   Social Connections: Not on file   Intimate Partner Violence: Not on file   Housing Stability: Not on file         Physical Exam:  Vitals as provided by the patient  /79   Ht 1.727 m (5' 8\")   Wt 114 kg (252 lb)    Oxygen Therapy:  Pulse Oximetry:  (Unable to obtain. Pt at work.)  BP Readings from Last 4 Encounters:   01/12/23 137/79   01/17/22 128/88   10/27/21 124/84   10/04/21 120/72       Weight/BMI: Body mass index is 38.32 kg/m².  Wt Readings from Last 4 Encounters:   01/12/23 114 kg (252 lb)   01/17/22 118 kg (259 lb 12.8 oz)   10/27/21 121 kg (266 lb 6.4 oz)   10/04/21 123 kg (271 lb)       GEN: Well developed, well nourished and in no acute distress.  Neck:  No JVD noted at 90 degrees, trachea midline  CVS: Pulse as reported by patient, no visible LE edema.  Resp: Unlabored respiratory effort, no cough or audible wheeze  MSK/Ext: No clubbing or cyanosis visible appreciated.  Skin: No rashes in visible areas.  Psych: A&O x 3, appropriate affect, good judgement, well groomed      Lab Data Review:  Sodium 139, potassium 3.8, bicarb 30, BUN 16, creatinine 1.8  Total cholesterol 146, HDL 24, LDL 88, triglycerides 167    Cardiac Imaging and Procedures Review:    Overnight pulse oximetry test:  Lowest oxygen saturation 66%, total time with oxygen saturation less than 88% is 39 minutes      Assessment & Plan     1. Essential hypertension  Basic Metabolic Panel    CBC WITHOUT DIFFERENTIAL      2. Dyslipidemia  HEMOGLOBIN A1C (Glycohemoglobin GHB Total/A1C with MBG Estimate)    Lipid Profile      3. High risk medication use  Basic Metabolic Panel    CBC WITHOUT DIFFERENTIAL      4. Nonalcoholic steatohepatitis (TELLES)  " HEMOGLOBIN A1C (Glycohemoglobin GHB Total/A1C with MBG Estimate)      5. Class 2 severe obesity due to excess calories with serious comorbidity and body mass index (BMI) of 38.0 to 38.9 in adult (HCC)  HEMOGLOBIN A1C (Glycohemoglobin GHB Total/A1C with MBG Estimate)          Blood pressure well controlled on current regimen.  Continue valsartan 320 mg, chlorthalidone 25 mg and amlodipine 5 mg daily.    Is on high risk medications, no recent blood test to monitor electrolytes and kidney function.  Obtain BMP.    Was recently diagnosed with fatty liver, has dyslipidemia with elevated triglycerides in the past concerning for prediabetes versus diabetes.  Obtain hemoglobin A1c to monitor along with CBC and lipid panel.    For obesity, discussed initiation of GLP-1 agonist if A1c shows prediabetes or diabetes.      All of patient's excellent questions were answered to the best of my knowledge and to her satisfaction.  It was a pleasure seeing Ms. Nazia Lyle in my clinic today. Return in about 3 months (around 4/12/2023). Patient is aware to call the cardiology clinic with any questions or concerns.      Leon Herman MD  [facility]  [facility], [address]  [address] [address]  Phone: [phone]  Fax: [phone]

## 2023-01-13 ENCOUNTER — TELEPHONE (OUTPATIENT)
Dept: CARDIOLOGY | Facility: MEDICAL CENTER | Age: 63
End: 2023-01-13
Payer: COMMERCIAL

## 2023-01-14 NOTE — TELEPHONE ENCOUNTER
Labs were sent to [facility] per DA.     Confirmation fax received and scanned into [employer].

## 2023-01-20 ENCOUNTER — APPOINTMENT (RX ONLY)
Dept: URBAN - METROPOLITAN AREA CLINIC 36 | Facility: CLINIC | Age: 63
Setting detail: DERMATOLOGY
End: 2023-01-20

## 2023-01-20 DIAGNOSIS — Z41.9 ENCOUNTER FOR PROCEDURE FOR PURPOSES OTHER THAN REMEDYING HEALTH STATE, UNSPECIFIED: ICD-10-CM

## 2023-01-20 PROCEDURE — ? BOTOX

## 2023-01-20 NOTE — PROCEDURE: BOTOX
Expiration Date (Month Year): 05/2025
Left Pupillary Line Units: 0
Show Additional Area 2: Yes
Post-Care Instructions: Patient instructed to not lie down for 4 hours and limit physical activity for 24 hours. Patient instructed not to travel by airplane for 48 hours.
Detail Level: Detailed
Additional Area 3 Location: masseter
Price (Use Numbers Only, No Special Characters Or $): 0.00
Show Lcl Units: No
Lot #: [id number]
Glabellar Complex Units: 20
Consent: Written consent obtained. Risks include but not limited to lid/brow ptosis, bruising, swelling, diplopia, temporary effect, incomplete chemical denervation.
Forehead Units: 10
Dilution (U/0.1 Cc): 0.6
Additional Area 1 Location: upper lip
Depressor Anguli Oris Units: 6
Additional Area 2 Location: chin
Periorbital Skin Units: 14

## 2023-01-23 NOTE — PROGRESS NOTES
CC: Annual follow-up for MADDI on APAP 5-15 cm water.        HPI:  Nazia Lyle is a 62 y.o.  for [facility] and [place] resident kindly referred by Tee Cedeño M.D. and last seen by Dr. Wilson on 1/17/2022 when her compliance was excellent and her AHI normalized at 4.8.    A prior home sleep study showed an DANILO of 60.6, supine DANILO of 71.7, and a shree saturation of 63%.    Today, her 30-day compliance is 96.7% for 7 hours and 53 minutes.  On auto titrating CPAP 5 to 15 cm water her average residual estimated apnea-hypopnea index is normalized 3.8.    The patient reports improved symptoms using positive airway pressure.  Has experienced no significant problems with mask fit, mask leak, pressure tolerance, excessive airway dryness, nasal congestion, aerophagia, or chest pain.      Significant comorbidities and modifying factors include hypertension, prediabetes, dyslipidemia, metabolic syndrome, inflammatory bowel disease, hypothyroidism, obesity, and never smoker.            Patient Active Problem List    Diagnosis Date Noted    Prediabetes 10/04/2021    Hypercholesterolemia 03/31/2021    Metabolic syndrome 08/19/2020    Weight gain 04/20/2020    Inflammatory bowel disease 04/20/2020    LVH (left ventricular hypertrophy) 04/20/2020    Family history of coronary artery disease 03/17/2020    Vitamin D deficiency 11/22/2019    Hypothyroidism 08/08/2014    Elevated fasting blood sugar 08/08/2014    Hot flash, menopausal 08/08/2014    Morbid obesity (HCC) 08/08/2014    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    Essential hypertension 09/17/2012    Dyslipidemia 09/17/2012       Past Medical History:   Diagnosis Date    Chickenpox     Dyslipidemia 9/17/2012    Bermudian measles     History of kidney stones     HTN (hypertension) 9/17/2012    Hypothyroid     Obesity 9/17/2012        Past Surgical History:   Procedure Laterality Date    HARDWARE REMOVAL UPPER EXTREMITY Left 8/11/2015     Procedure: HARDWARE REMOVAL UPPER EXTREMITY;  Surgeon: Chaka Mckeon M.D.;  Location: SURGERY [facility] ORS;  Service:     APPENDECTOMY      CHOLECYSTECTOMY      PRIMARY C SECTION      TONSILLECTOMY      TONSILLECTOMY         Family History   Problem Relation Age of Onset    Thyroid Mother     Diabetes Father     Hypertension Father     Diabetes Maternal Grandmother     Diabetes Maternal Grandfather     Heart Disease Paternal Grandmother     Cancer Paternal Grandfather        Social History     Socioeconomic History    Marital status:      Spouse name: Not on file    Number of children: Not on file    Years of education: Not on file    Highest education level: Not on file   Occupational History    Not on file   Tobacco Use    Smoking status: Never    Smokeless tobacco: Never   Vaping Use    Vaping Use: Never used   Substance and Sexual Activity    Alcohol use: No    Drug use: No     Comment: , 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, works as human     Sexual activity: Not on file   Other Topics Concern    Not on file   Social History Narrative    Not on file     Social Determinants of Health     Financial Resource Strain: Not on file   Food Insecurity: Not on file   Transportation Needs: Not on file   Physical Activity: Not on file   Stress: Not on file   Social Connections: Not on file   Intimate Partner Violence: Not on file   Housing Stability: Not on file       Current Outpatient Medications   Medication Sig Dispense Refill    Ascorbic Acid (VITAMIN C CR) 1500 MG Tab CR       atorvastatin (LIPITOR) 40 MG Tab TAKE ONE TABLET BY MOUTH EVERY EVENING 90 Tablet 0    valsartan (DIOVAN) 320 MG tablet take 1 tablet by mouth once daily 90 Tablet 0    chlorthalidone (HYGROTON) 25 MG Tab take 1 tablet by mouth once daily 90 Tablet 0    amLODIPine (NORVASC) 5 MG Tab take 1 tablet by mouth once daily 90 Tablet 0    SYNTHROID 125 MCG Tab Take 1 Tablet by mouth every morning on an empty  "stomach. 90 Tablet 1    VITAMIN D, CHOLECALCIFEROL, PO Take  by mouth.      Multiple Vitamins-Minerals (MULTI COMPLETE PO) Take  by mouth.      B Complex Vitamins (VITAMIN B COMPLEX PO) Take  by mouth.      Probiotic Product (SOLUBLE FIBER/PROBIOTICS PO) Take  by mouth.      Colostrum 500 MG Cap       omega-3 acid ethyl esters (LOVAZA) 1 GM capsule Take 2 Caps by mouth 2 Times a Day. (Patient not taking: Reported on 10/4/2021) 360 Cap 3     No current facility-administered medications for this visit.    \"CURRENT RX\"    ALLERGIES: Latex and Nkda [no known drug allergy]    ROS    Constitutional: Denies fever, chills, sweats,  weight loss, fatigue  Cardiovascular: Denies chest pain, tightness, palpitations, swelling in legs/feet, fainting, difficulty breathing when lying down but gets better when sitting up.   Respiratory: Denies shortness of breath, cough, sputum, wheezing, painful breathing, coughing up blood.   Sleep: per HPI  Gastrointestinal: Denies  difficulty swallowing, nausea, abdominal pain, diarrhea, constipation, heartburn.  Musculoskeletal: Denies painful joints, sore muscles, back pain.        PHYSICAL EXAM      /84 (BP Location: Left arm, Patient Position: Sitting, BP Cuff Size: Adult)   Pulse 82   Resp 16   Ht 1.727 m (5' 8\")   Wt 121 kg (266 lb)   SpO2 94%   BMI 40.45 kg/m²   Appearance: Well-nourished, well-developed, no acute distress  Eyes:  PERRLA, EOMI  ENMT: masked  Neck: Supple, trachea midline, no masses  Respiratory effort:  No intercostal retractions or use of accessory muscles  Lung auscultation:  No wheezes rhonchi rubs or rales  Cardiac: No murmurs, rubs, or gallops; regular rhythm, normal rate; no edema  Abdomen:  No tenderness, no organomegaly.  Musculoskeletal:  Grossly normal; gait and station normal; digits and nails normal  Skin:  No rashes, petechiae, cyanosis  Neurologic: without focal signs; oriented to person, time, place, and purpose; judgement intact  Psychiatric:  " No depression, anxiety, agitation      Medical Decision Making       The medical record was reviewed in its entirety including the referral notes, records from primary care, consultants notes, hospital records, lab, imaging, microbiology, immunology, and immunizations.  Care gaps identified and reviewed with the patient.    Diagnostic and titration nocturnal polysomnogram's, home sleep apnea tests, continuous nocturnal oximetry results, multiple sleep latency tests, and compliance reports reviewed.  1. MADDI (obstructive sleep apnea)  - DME Mask and Supplies      PLAN:   Has been advised to continue the current APAP 5 to 15 cm water I think treatment to, clean equipment frequently, and get new mask and supplies as allowed by insurance and DME. Advised about potential problems including nasal obstruction/stuffiness, mask leak or discomfort , frequent awakenings, chill or dryness of the upper airway, noise, inconvenience, and lack of benefit. Recommend an earlier appointment, if significant treatment barriers develop.    The risks of untreated sleep apnea were discussed with the patient at length. Patients with MADDI are at increased risk of cardiovascular disease including systemic arterial hypertension, pulmonary arterial hypertension (transient or fixed), TIA, and an elevated risk of stroke, heart attack, sudden death, or arrhythmia between the hours of 11 PM and 6 AM. MADDI patients have an increased risk of motor vehicle accidents, type 2 diabetes, GERD, repetitive mechanical trauma to pharyngeal muscles with inflammation and denervation, frequent EEG arousals leading to nonrestorative sleep, excessive daytime sleepiness, fatigue, depression, poor pain control, irritability, and lower quality of life.  The patient was advised to avoid driving a motor vehicle when drowsy.    Positive airway pressure will favorably impact many of the adverse conditions and effects provoked by MADDI.    Have advised the patient to follow up  with the appropriate healthcare practitioners for all other medical problems and issues.    Return in about 1 year (around 1/24/2024).    Total time 30 minutes

## 2023-01-24 ENCOUNTER — OFFICE VISIT (OUTPATIENT)
Dept: SLEEP MEDICINE | Facility: MEDICAL CENTER | Age: 63
End: 2023-01-24
Payer: COMMERCIAL

## 2023-01-24 VITALS
DIASTOLIC BLOOD PRESSURE: 84 MMHG | OXYGEN SATURATION: 94 % | HEART RATE: 82 BPM | SYSTOLIC BLOOD PRESSURE: 128 MMHG | WEIGHT: 266 LBS | BODY MASS INDEX: 40.32 KG/M2 | HEIGHT: 68 IN | RESPIRATION RATE: 16 BRPM

## 2023-01-24 DIAGNOSIS — G47.33 OSA (OBSTRUCTIVE SLEEP APNEA): ICD-10-CM

## 2023-01-24 PROCEDURE — 99214 OFFICE O/P EST MOD 30 MIN: CPT | Performed by: INTERNAL MEDICINE

## 2023-02-07 DIAGNOSIS — I16.0 HYPERTENSIVE URGENCY: ICD-10-CM

## 2023-02-07 DIAGNOSIS — I10 ESSENTIAL HYPERTENSION: ICD-10-CM

## 2023-02-10 RX ORDER — CHLORTHALIDONE 25 MG/1
TABLET ORAL
Qty: 90 TABLET | Refills: 0 | Status: SHIPPED | OUTPATIENT
Start: 2023-02-10 | End: 2023-05-17

## 2023-02-10 RX ORDER — VALSARTAN 320 MG/1
TABLET ORAL
Qty: 90 TABLET | Refills: 3 | Status: SHIPPED | OUTPATIENT
Start: 2023-02-10 | End: 2024-02-27

## 2023-02-10 RX ORDER — AMLODIPINE BESYLATE 5 MG/1
TABLET ORAL
Qty: 90 TABLET | Refills: 3 | Status: SHIPPED | OUTPATIENT
Start: 2023-02-10 | End: 2023-08-18

## 2023-02-10 NOTE — TELEPHONE ENCOUNTER
Is the patient due for a refill? Yes    Was the patient seen the past year? Yes    Date of last office visit: 1/12/2023    Does the patient have an upcoming appointment?  Yes   If yes, When? 4/13/2023    Provider to refill:DINO    Does the patients insurance require a 100 day supply?  No

## 2023-02-13 DIAGNOSIS — Z79.899 HIGH RISK MEDICATION USE: ICD-10-CM

## 2023-02-13 DIAGNOSIS — I10 ESSENTIAL HYPERTENSION: ICD-10-CM

## 2023-02-23 ENCOUNTER — APPOINTMENT (RX ONLY)
Dept: URBAN - METROPOLITAN AREA CLINIC 6 | Facility: CLINIC | Age: 63
Setting detail: DERMATOLOGY
End: 2023-02-23

## 2023-02-23 DIAGNOSIS — D22 MELANOCYTIC NEVI: ICD-10-CM

## 2023-02-23 DIAGNOSIS — L82.1 OTHER SEBORRHEIC KERATOSIS: ICD-10-CM

## 2023-02-23 DIAGNOSIS — L20.89 OTHER ATOPIC DERMATITIS: ICD-10-CM

## 2023-02-23 DIAGNOSIS — Z86.007 PERSONAL HISTORY OF IN-SITU NEOPLASM OF SKIN: ICD-10-CM

## 2023-02-23 DIAGNOSIS — L81.4 OTHER MELANIN HYPERPIGMENTATION: ICD-10-CM

## 2023-02-23 DIAGNOSIS — D18.0 HEMANGIOMA: ICD-10-CM

## 2023-02-23 DIAGNOSIS — Z71.89 OTHER SPECIFIED COUNSELING: ICD-10-CM

## 2023-02-23 DIAGNOSIS — L72.8 OTHER FOLLICULAR CYSTS OF THE SKIN AND SUBCUTANEOUS TISSUE: ICD-10-CM

## 2023-02-23 PROBLEM — D22.61 MELANOCYTIC NEVI OF RIGHT UPPER LIMB, INCLUDING SHOULDER: Status: ACTIVE | Noted: 2023-02-23

## 2023-02-23 PROBLEM — D22.71 MELANOCYTIC NEVI OF RIGHT LOWER LIMB, INCLUDING HIP: Status: ACTIVE | Noted: 2023-02-23

## 2023-02-23 PROBLEM — L20.84 INTRINSIC (ALLERGIC) ECZEMA: Status: ACTIVE | Noted: 2023-02-23

## 2023-02-23 PROBLEM — D18.01 HEMANGIOMA OF SKIN AND SUBCUTANEOUS TISSUE: Status: ACTIVE | Noted: 2023-02-23

## 2023-02-23 PROBLEM — D22.62 MELANOCYTIC NEVI OF LEFT UPPER LIMB, INCLUDING SHOULDER: Status: ACTIVE | Noted: 2023-02-23

## 2023-02-23 PROBLEM — D22.5 MELANOCYTIC NEVI OF TRUNK: Status: ACTIVE | Noted: 2023-02-23

## 2023-02-23 PROBLEM — D22.72 MELANOCYTIC NEVI OF LEFT LOWER LIMB, INCLUDING HIP: Status: ACTIVE | Noted: 2023-02-23

## 2023-02-23 PROCEDURE — ? COUNSELING

## 2023-02-23 PROCEDURE — ? DIAGNOSIS COMMENT

## 2023-02-23 PROCEDURE — 99213 OFFICE O/P EST LOW 20 MIN: CPT

## 2023-02-23 PROCEDURE — ? PRESCRIPTION

## 2023-02-23 RX ORDER — DOXYCYCLINE HYCLATE 100 MG/1
1 TABLET, COATED ORAL BID
Qty: 14 | Refills: 0 | Status: ERX | COMMUNITY
Start: 2023-02-23

## 2023-02-23 RX ADMIN — DOXYCYCLINE HYCLATE 1: 100 TABLET, COATED ORAL at 00:00

## 2023-02-23 ASSESSMENT — LOCATION DETAILED DESCRIPTION DERM
LOCATION DETAILED: RIGHT PROXIMAL POSTERIOR THIGH
LOCATION DETAILED: RIGHT PROXIMAL PRETIBIAL REGION
LOCATION DETAILED: LEFT MID-UPPER BACK
LOCATION DETAILED: RIGHT VENTRAL PROXIMAL FOREARM
LOCATION DETAILED: LEFT INFERIOR CENTRAL MALAR CHEEK
LOCATION DETAILED: EPIGASTRIC SKIN
LOCATION DETAILED: RIGHT ANTERIOR DISTAL UPPER ARM
LOCATION DETAILED: RIGHT CENTRAL PARIETAL SCALP
LOCATION DETAILED: RIGHT DISTAL CALF
LOCATION DETAILED: LEFT ANTERIOR DISTAL UPPER ARM
LOCATION DETAILED: LEFT ANTERIOR DISTAL THIGH
LOCATION DETAILED: LEFT GLUTEAL CREASE
LOCATION DETAILED: LEFT SUPERIOR MEDIAL MIDBACK
LOCATION DETAILED: RIGHT DISTAL POSTERIOR THIGH
LOCATION DETAILED: LEFT PROXIMAL DORSAL FOREARM
LOCATION DETAILED: LEFT PROXIMAL POSTERIOR THIGH
LOCATION DETAILED: LEFT VENTRAL DISTAL FOREARM
LOCATION DETAILED: RIGHT INFERIOR MEDIAL UPPER BACK
LOCATION DETAILED: LEFT VENTRAL PROXIMAL FOREARM
LOCATION DETAILED: LEFT DISTAL POSTERIOR THIGH
LOCATION DETAILED: RIGHT PROXIMAL DORSAL FOREARM
LOCATION DETAILED: LEFT PROXIMAL PRETIBIAL REGION
LOCATION DETAILED: RIGHT VENTRAL DISTAL FOREARM
LOCATION DETAILED: RIGHT ANTERIOR DISTAL THIGH

## 2023-02-23 ASSESSMENT — LOCATION SIMPLE DESCRIPTION DERM
LOCATION SIMPLE: LEFT LOWER BACK
LOCATION SIMPLE: LEFT PRETIBIAL REGION
LOCATION SIMPLE: RIGHT PRETIBIAL REGION
LOCATION SIMPLE: RIGHT UPPER BACK
LOCATION SIMPLE: RIGHT THIGH
LOCATION SIMPLE: RIGHT UPPER ARM
LOCATION SIMPLE: LEFT GUTEAL CREASE
LOCATION SIMPLE: LEFT CHEEK
LOCATION SIMPLE: RIGHT CALF
LOCATION SIMPLE: ABDOMEN
LOCATION SIMPLE: LEFT UPPER ARM
LOCATION SIMPLE: LEFT FOREARM
LOCATION SIMPLE: RIGHT FOREARM
LOCATION SIMPLE: LEFT POSTERIOR THIGH
LOCATION SIMPLE: LEFT THIGH
LOCATION SIMPLE: SCALP
LOCATION SIMPLE: RIGHT POSTERIOR THIGH
LOCATION SIMPLE: LEFT UPPER BACK

## 2023-02-23 ASSESSMENT — LOCATION ZONE DERM
LOCATION ZONE: SCALP
LOCATION ZONE: ARM
LOCATION ZONE: TRUNK
LOCATION ZONE: LEG
LOCATION ZONE: FACE

## 2023-02-28 ENCOUNTER — PATIENT MESSAGE (OUTPATIENT)
Dept: CARDIOLOGY | Facility: MEDICAL CENTER | Age: 63
End: 2023-02-28
Payer: COMMERCIAL

## 2023-03-02 ENCOUNTER — PATIENT MESSAGE (OUTPATIENT)
Dept: CARDIOLOGY | Facility: MEDICAL CENTER | Age: 63
End: 2023-03-02
Payer: COMMERCIAL

## 2023-03-02 NOTE — TELEPHONE ENCOUNTER
If patient is interested, can you please put in a referral to pharmacotherapy and in comments specify for consideration of GLP-1 agonist?  Thanks.

## 2023-03-03 ENCOUNTER — PATIENT MESSAGE (OUTPATIENT)
Dept: CARDIOLOGY | Facility: MEDICAL CENTER | Age: 63
End: 2023-03-03
Payer: COMMERCIAL

## 2023-03-03 DIAGNOSIS — E78.5 DYSLIPIDEMIA: ICD-10-CM

## 2023-03-03 DIAGNOSIS — E03.9 ACQUIRED HYPOTHYROIDISM: ICD-10-CM

## 2023-03-03 NOTE — PATIENT COMMUNICATION
Referral order placed per DA      Referral Notes  Number of Notes: 1  .  Type Date User Summary Attachment   Provider Comments 03/03/2023  1:21 PM Linda Benton R.N. Provider Comments -   Note    Consideration of GLP-1 agonist per Dr Herman              Referral Order    Order   Referral to Pharmacotherapy Service (Order # [id number]) on 03/03/2023   View Encounter

## 2023-03-03 NOTE — TELEPHONE ENCOUNTER
Can you please put in a referral to pharmacotherapy?  In comments: Consideration of GLP-1 agonist  Thanks.

## 2023-03-06 ENCOUNTER — TELEPHONE (OUTPATIENT)
Dept: VASCULAR LAB | Facility: MEDICAL CENTER | Age: 63
End: 2023-03-06
Payer: COMMERCIAL

## 2023-03-06 NOTE — TELEPHONE ENCOUNTER
[facility]    Received pharmacotherapy referral for lipids/GLP1 from Dr. Herman on 3/3/23    Scheduled pt for initial visit on 3/20.    Insurance: Brian RANDALL  PCP: [facility]  Locations to be seen: [address]    [facility] at [phone], fax [phone]    Michael Strong, PharmD, BCACP

## 2023-03-20 ENCOUNTER — NON-PROVIDER VISIT (OUTPATIENT)
Dept: CARDIOLOGY | Facility: MEDICAL CENTER | Age: 63
End: 2023-03-20
Payer: COMMERCIAL

## 2023-03-20 VITALS — WEIGHT: 279 LBS | BODY MASS INDEX: 42.42 KG/M2

## 2023-03-20 DIAGNOSIS — R73.01 ELEVATED FASTING BLOOD SUGAR: ICD-10-CM

## 2023-03-20 DIAGNOSIS — E66.01 MORBID OBESITY (HCC): ICD-10-CM

## 2023-03-20 DIAGNOSIS — E78.5 DYSLIPIDEMIA: ICD-10-CM

## 2023-03-20 DIAGNOSIS — I10 ESSENTIAL HYPERTENSION: ICD-10-CM

## 2023-03-20 PROCEDURE — 99211 OFF/OP EST MAY X REQ PHY/QHP: CPT | Performed by: INTERNAL MEDICINE

## 2023-03-20 RX ORDER — SEMAGLUTIDE 0.68 MG/ML
0.25 INJECTION, SOLUTION SUBCUTANEOUS
Qty: 3 ML | Refills: 1 | Status: SHIPPED | OUTPATIENT
Start: 2023-03-20 | End: 2023-05-01 | Stop reason: SDUPTHER

## 2023-03-20 NOTE — PROGRESS NOTES
Patient Consult Note    Primary care physician: Tee Cedeño M.D.    Reason for consult: Obesity/Weight Management    HPI:  Nazia Lyle is a 63 y.o. old patient who comes in today for evaluation of above stated problem.    Initial Weight: 279 lbs    Initial BMI: 42.42kg/m2    Most Recent HbA1c:       Obesity Medication History and Current Regimen  None - Previously tried phentermine and metformin. Pt states that metformin was only Dc'd d/t being lost to f/u w/ other wt management clinic. Pt w/ hx of IBD - can only tolerate metformin 500 mg 1/2 tab BID.    Current Exercise - Goes for walk 1-2x/week.  Exercise Goal - Increase as tolerated to goal of 150 min/week.    Dietary:  Breakfast - Protein bar, egg whites  Lunch - Salad, tuna  Dinner - Lean protein and vegetable  Snack - Doesn't typically snack  Dessert - Avoids as much as she can. Will indulge w/ fruit if she craves sweets. Occasional pastries.  Beverage - Water, coffee w/ cream & Stevia    Preventative Management  BP regimen (ACE/ARB) - Valsartan 320 mg once daily  ASA - None  Statin - Atorvastatin 40 mg once daily    Past Medical History:  Patient Active Problem List    Diagnosis Date Noted    Prediabetes 10/04/2021    Hypercholesterolemia 03/31/2021    Metabolic syndrome 08/19/2020    Weight gain 04/20/2020    Inflammatory bowel disease 04/20/2020    LVH (left ventricular hypertrophy) 04/20/2020    Family history of coronary artery disease 03/17/2020    Vitamin D deficiency 11/22/2019    Hypothyroidism 08/08/2014    Elevated fasting blood sugar 08/08/2014    Hot flash, menopausal 08/08/2014    Morbid obesity (HCC) 08/08/2014    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    Essential hypertension 09/17/2012    Dyslipidemia 09/17/2012       Past Surgical History:  Past Surgical History:   Procedure Laterality Date    HARDWARE REMOVAL UPPER EXTREMITY Left 8/11/2015    Procedure: HARDWARE REMOVAL UPPER EXTREMITY;  Surgeon: Chaka Mckeon,  M.D.;  Location: SURGERY [facility] ORS;  Service:     APPENDECTOMY      CHOLECYSTECTOMY      PRIMARY C SECTION      TONSILLECTOMY      TONSILLECTOMY         Allergies:  Latex and Nkda [no known drug allergy]    Social History:  Social History     Socioeconomic History    Marital status:      Spouse name: Not on file    Number of children: Not on file    Years of education: Not on file    Highest education level: Not on file   Occupational History    Not on file   Tobacco Use    Smoking status: Never    Smokeless tobacco: Never   Vaping Use    Vaping Use: Never used   Substance and Sexual Activity    Alcohol use: No    Drug use: No     Comment: , 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, works as human     Sexual activity: Not on file   Other Topics Concern    Not on file   Social History Narrative    Not on file     Social Determinants of Health     Financial Resource Strain: Not on file   Food Insecurity: Not on file   Transportation Needs: Not on file   Physical Activity: Not on file   Stress: Not on file   Social Connections: Not on file   Intimate Partner Violence: Not on file   Housing Stability: Not on file       Family History:  Family History   Problem Relation Age of Onset    Thyroid Mother     Diabetes Father     Hypertension Father     Diabetes Maternal Grandmother     Diabetes Maternal Grandfather     Heart Disease Paternal Grandmother     Cancer Paternal Grandfather        Medications:    Current Outpatient Medications:     Semaglutide,0.25 or 0.5MG/DOS, (OZEMPIC, 0.25 OR 0.5 MG/DOSE,) 2 MG/3ML Solution Pen-injector, Inject 0.25 mg under the skin every 7 days., Disp: 3 mL, Rfl: 1    valsartan (DIOVAN) 320 MG tablet, take 1 tablet by mouth once daily, Disp: 90 Tablet, Rfl: 3    chlorthalidone (HYGROTON) 25 MG Tab, take 1 tablet by mouth once daily, Disp: 90 Tablet, Rfl: 0    amLODIPine (NORVASC) 5 MG Tab, take 1 tablet by mouth once daily, Disp: 90 Tablet, Rfl: 3    Ascorbic Acid  "(VITAMIN C CR) 1500 MG Tab CR, , Disp: , Rfl:     Colostrum 500 MG Cap, , Disp: , Rfl:     atorvastatin (LIPITOR) 40 MG Tab, TAKE ONE TABLET BY MOUTH EVERY EVENING, Disp: 90 Tablet, Rfl: 0    VITAMIN D, CHOLECALCIFEROL, PO, Take  by mouth., Disp: , Rfl:     Multiple Vitamins-Minerals (MULTI COMPLETE PO), Take  by mouth., Disp: , Rfl:     Probiotic Product (SOLUBLE FIBER/PROBIOTICS PO), Take  by mouth., Disp: , Rfl:     SYNTHROID 125 MCG Tab, Take 1 Tablet by mouth every morning on an empty stomach. (Patient not taking: Reported on 3/20/2023), Disp: 90 Tablet, Rfl: 1    omega-3 acid ethyl esters (LOVAZA) 1 GM capsule, Take 2 Caps by mouth 2 Times a Day. (Patient not taking: Reported on 10/4/2021), Disp: 360 Cap, Rfl: 3    B Complex Vitamins (VITAMIN B COMPLEX PO), Take  by mouth. (Patient not taking: Reported on 3/20/2023), Disp: , Rfl:     Labs: Reviewed    Physical Examination:  Vital signs: Wt (!) 127 kg (279 lb)   BMI 42.42 kg/m²  Body mass index is 42.42 kg/m².    Assessment and Plan:    1. Obesity/Weight Management  Pt presents to clinic to establish care. She was referred by her cardiologist given recent A1c of 6.1% (2/2023) and morbid obesity (BMI > 40).  Pt previously followed w/ a wt management clinic, but was lost to f/u. She was on phentermine and metformin at this time. Has since Dc'd these meds. She did tolerate a small dose of metformin - she states \"1/2 tab twice daily\".  Pt is a candidate for semaglutide given BMI > 30. No personal or family hx of thyroid carcinoma noted.  Pt amenable to increasing physical activity. She eats a healthy diet - encouraged her to look into DASH Diet.    - Medication changes:  START Ozempic 0.25 mg subQ once weekly  Counseled pt regarding MOA, SE, and administration     - Lifestyle changes:  Diet: Maximize lean proteins and veggies. Cut out/down on carbs. Avoid simple sugars.   Exercise: Increase as tolerated    FU: 6 weeks    Michael Strong, PharmD, " [facility]  03/20/23    CC:   Tee Cedeño M.D.

## 2023-03-23 RX ORDER — LEVOTHYROXINE SODIUM 125 MCG
125 TABLET ORAL
Qty: 90 TABLET | Refills: 0 | Status: SHIPPED | OUTPATIENT
Start: 2023-03-23 | End: 2023-04-20 | Stop reason: SDUPTHER

## 2023-03-31 ENCOUNTER — TELEPHONE (OUTPATIENT)
Dept: CARDIOLOGY | Facility: MEDICAL CENTER | Age: 63
End: 2023-03-31
Payer: COMMERCIAL

## 2023-03-31 NOTE — TELEPHONE ENCOUNTER
Called patient to remind them of pending fasting labwork to complete for upcoming visit with DA. Patient did not answer so I left a message.

## 2023-04-03 DIAGNOSIS — E78.5 DYSLIPIDEMIA: ICD-10-CM

## 2023-04-03 RX ORDER — ATORVASTATIN CALCIUM 40 MG/1
40 TABLET, FILM COATED ORAL EVERY EVENING
Qty: 90 TABLET | Refills: 3 | Status: SHIPPED | OUTPATIENT
Start: 2023-04-03 | End: 2024-03-25 | Stop reason: SDUPTHER

## 2023-04-05 NOTE — PROGRESS NOTES
Called and confirmed that pt was able to obtain and start Ozempic.    Michael Strong, PharmD, BCACP

## 2023-04-07 ENCOUNTER — TELEPHONE (OUTPATIENT)
Dept: CARDIOLOGY | Facility: MEDICAL CENTER | Age: 63
End: 2023-04-07
Payer: COMMERCIAL

## 2023-04-07 DIAGNOSIS — I10 ESSENTIAL HYPERTENSION: ICD-10-CM

## 2023-04-07 DIAGNOSIS — E87.6 HYPOKALEMIA: ICD-10-CM

## 2023-04-07 DIAGNOSIS — Z79.899 HIGH RISK MEDICATION USE: ICD-10-CM

## 2023-04-07 RX ORDER — POTASSIUM CHLORIDE 20 MEQ/1
40 TABLET, EXTENDED RELEASE ORAL 2 TIMES DAILY
Qty: 120 TABLET | Refills: 0 | Status: SHIPPED | OUTPATIENT
Start: 2023-04-07 | End: 2023-05-04

## 2023-04-07 RX ORDER — POTASSIUM CHLORIDE 20 MEQ/1
40 TABLET, EXTENDED RELEASE ORAL 2 TIMES DAILY
Qty: 120 TABLET | Refills: 0 | Status: SHIPPED | OUTPATIENT
Start: 2023-04-07 | End: 2023-04-07

## 2023-04-07 NOTE — TELEPHONE ENCOUNTER
Called pt [phone]  Relayed critical results and DA recommendations. Educated pt on possible s/s of life-threatening arrhythmias. Pharmacy verified. Repeat lab mailed to home address. Address verified. ER PRECAUTIONS STRESSED X2. Pt verbalized understanding and is appreciative of call.       potassium chloride SA (KDUR) 20 MEQ Tab  Tablet 0/0 4/7/2023     Sig - Route: Take 2 Tablets by mouth 2 times a day. - Oral    Sent to pharmacy as: Potassium Chloride Anita ER 20 MEQ Oral Tablet Extended Release (Kdur)    Notes to Pharmacy: Take 40 mEq twice daily for 7 days. Please fill immediately due to critical low potassium. Must start medication today.    Cosign for Ordering: Required by Leon Herman M.D.    E-Prescribing Status: Receipt confirmed by pharmacy (4/7/2023  2:18 PM PDT)      Pharmacy    [id number] [address]

## 2023-04-07 NOTE — TELEPHONE ENCOUNTER
DINO    Caller: - [facility]    Topic/issue:   Has Urgent Lab Results    Callback Number: [phone]    Thank you,   Julissa BOLAND

## 2023-04-07 NOTE — TELEPHONE ENCOUNTER
Called [phone]  S/w Anita. Anita reports critical low potassium = 2.9. verified pt with 2 identifiers(name/). Provided fax number [phone] to fax over critical lab results. Anita verbalized understanding.         To DA, high priority and voalte message sent

## 2023-04-07 NOTE — TELEPHONE ENCOUNTER
Please call and let the patient know about critically low potassium levels with risk for life-threatening cardiac arrhythmias.  Recommend starting K-Dur 40 mEq twice daily for 1 week along with potassium rich diet.  Repeat BMP in 1 week.  Thanks.

## 2023-04-13 ENCOUNTER — TELEMEDICINE (OUTPATIENT)
Dept: CARDIOLOGY | Facility: MEDICAL CENTER | Age: 63
End: 2023-04-13
Payer: COMMERCIAL

## 2023-04-13 VITALS — HEIGHT: 68 IN | WEIGHT: 270 LBS | BODY MASS INDEX: 40.92 KG/M2

## 2023-04-13 DIAGNOSIS — I10 ESSENTIAL HYPERTENSION: ICD-10-CM

## 2023-04-13 DIAGNOSIS — Z79.899 HIGH RISK MEDICATION USE: ICD-10-CM

## 2023-04-13 DIAGNOSIS — E66.01 MORBID OBESITY WITH BMI OF 40.0-44.9, ADULT (HCC): ICD-10-CM

## 2023-04-13 DIAGNOSIS — E87.6 HYPOKALEMIA: ICD-10-CM

## 2023-04-13 DIAGNOSIS — E78.2 MIXED HYPERLIPIDEMIA: ICD-10-CM

## 2023-04-13 DIAGNOSIS — R73.03 PREDIABETES: ICD-10-CM

## 2023-04-13 PROCEDURE — 99214 OFFICE O/P EST MOD 30 MIN: CPT | Mod: 95 | Performed by: INTERNAL MEDICINE

## 2023-04-13 NOTE — PROGRESS NOTES
Cardiology Telemedicine Visit Follow-up Consultation Note    Date of note:     4/13/2023  Primary Care Provider: Tee Cedeño M.D.    Name:             Nazia Lyle   YOB: 1960  MRN:               [id number]      This evaluation was conducted via Zoom, using secure and encrypted videoconferencing technology.  The patient was at home in the [facility].  The patient's identity was confirmed and verbal consent for the telemedicine encounter was obtained.       Chief Complaint   Patient presents with    Hypertension     FV DX: Essential hypertension    Other     FV DX: Hypertensive urgency       HISTORY OF PRESENT ILLNESS  Ms. Nazia Lyle is a 60 y.o. female who returns to see us for follow-up     Pertinent History:  Essential HTN   Dyslipidemia  TELLES  Obesity with BMI 38.3     Last clinic visit: 1/12/2023    Interim history:  Since her last visit, has been doing well from a cardiovascular perspective.  She is now on Ozempic 0.25 mg.  No weight loss yet.  Blood pressure well controlled on current regimen.  She is taking potassium supplement for hypokalemia.  Repeat labs today.    Interim History 1/12/2023:  Since her last visit, patient has had a rough 2022.  Mother had a stroke end of May 2022 with significant residual deficits.  Unfortunately, she lost her only brother from COVID infection who lives in [place].  Mother recently passed on 12/21/2022 from complications of stroke.     From a cardiac perspective, denies any concerning symptoms of chest pain, pressure, palpitations or elevated blood pressure with recent stressful events.  Is taking medication on a regular basis without any side effects.    For weight loss, is working with a  with focus on dietary and lifestyle modifications.      Past Medical History:   Diagnosis Date    Chickenpox     Dyslipidemia 9/17/2012    Bengali measles     History of kidney stones     HTN (hypertension) 9/17/2012    Hypothyroid      Obesity 9/17/2012         Past Surgical History:   Procedure Laterality Date    HARDWARE REMOVAL UPPER EXTREMITY Left 8/11/2015    Procedure: HARDWARE REMOVAL UPPER EXTREMITY;  Surgeon: Chaka Mckeon M.D.;  Location: SURGERY [facility];  Service:     APPENDECTOMY      CHOLECYSTECTOMY      PRIMARY C SECTION      TONSILLECTOMY      TONSILLECTOMY           Current Outpatient Medications   Medication Sig Dispense Refill    potassium chloride SA (KDUR) 20 MEQ Tab CR Take 2 Tablets by mouth 2 times a day. 120 Tablet 0    atorvastatin (LIPITOR) 40 MG Tab Take 1 Tablet by mouth every evening. 90 Tablet 3    SYNTHROID 125 MCG Tab Take 1 Tablet by mouth every morning on an empty stomach. 90 Tablet 0    Semaglutide,0.25 or 0.5MG/DOS, (OZEMPIC, 0.25 OR 0.5 MG/DOSE,) 2 MG/3ML Solution Pen-injector Inject 0.25 mg under the skin every 7 days. 3 mL 1    valsartan (DIOVAN) 320 MG tablet take 1 tablet by mouth once daily 90 Tablet 3    chlorthalidone (HYGROTON) 25 MG Tab take 1 tablet by mouth once daily 90 Tablet 0    amLODIPine (NORVASC) 5 MG Tab take 1 tablet by mouth once daily 90 Tablet 3    Ascorbic Acid (VITAMIN C CR) 1500 MG Tab CR       Colostrum 500 MG Cap       VITAMIN D, CHOLECALCIFEROL, PO Take  by mouth.      Multiple Vitamins-Minerals (MULTI COMPLETE PO) Take  by mouth.      B Complex Vitamins (VITAMIN B COMPLEX PO) Take  by mouth.      Probiotic Product (SOLUBLE FIBER/PROBIOTICS PO) Take  by mouth.      omega-3 acid ethyl esters (LOVAZA) 1 GM capsule Take 2 Caps by mouth 2 Times a Day. (Patient not taking: Reported on 10/4/2021) 360 Cap 3     No current facility-administered medications for this visit.         Allergies   Allergen Reactions    Latex Rash     rash    Nkda [No Known Drug Allergy]          Family History   Problem Relation Age of Onset    Thyroid Mother     Diabetes Father     Hypertension Father     Diabetes Maternal Grandmother     Diabetes Maternal Grandfather     Heart Disease Paternal  "Grandmother     Cancer Paternal Grandfather          Social History     Socioeconomic History    Marital status:      Spouse name: Not on file    Number of children: Not on file    Years of education: Not on file    Highest education level: Not on file   Occupational History    Not on file   Tobacco Use    Smoking status: Never    Smokeless tobacco: Never   Vaping Use    Vaping Use: Never used   Substance and Sexual Activity    Alcohol use: No    Drug use: No     Comment: , 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, works as human     Sexual activity: Not on file   Other Topics Concern    Not on file   Social History Narrative    Not on file     Social Determinants of Health     Financial Resource Strain: Not on file   Food Insecurity: Not on file   Transportation Needs: Not on file   Physical Activity: Not on file   Stress: Not on file   Social Connections: Not on file   Intimate Partner Violence: Not on file   Housing Stability: Not on file         Physical Exam:  Vitals as provided by the patient  Ht 1.727 m (5' 8\")   Wt 122 kg (270 lb)    Oxygen Therapy:     BP Readings from Last 4 Encounters:   01/24/23 128/84   01/12/23 137/79   01/17/22 128/88   10/27/21 124/84       Weight/BMI: Body mass index is 41.05 kg/m².  Wt Readings from Last 4 Encounters:   04/13/23 122 kg (270 lb)   03/20/23 (!) 127 kg (279 lb)   01/24/23 121 kg (266 lb)   01/12/23 114 kg (252 lb)       GEN: Well developed, well nourished and in no acute distress.  Neck:  No JVD noted at 90 degrees, trachea midline  CVS: Pulse as reported by patient, no visible LE edema.  Resp: Unlabored respiratory effort, no cough or audible wheeze  MSK/Ext: No clubbing or cyanosis visible appreciated.  Skin: No rashes in visible areas.  Psych: A&O x 3, appropriate affect, good judgement, well groomed      Outside lab data review 4/7/2023:  Sodium 139, potassium 2.9, BUN 15, creatinine 0.6  , HDL 26, LDL 97,     Outside lab " Data Review 2/13/2023:  WBC 6.6, hemoglobin 14.5, platelets 356  A1c 6.1  Sodium 138, potassium 3.3, BUN 18, creatinine 0.5    Sodium 139, potassium 3.8, bicarb 30, BUN 16, creatinine 1.8  Total cholesterol 146, HDL 24, LDL 88, triglycerides 167      Cardiac Imaging and Procedures Review:    Overnight pulse oximetry test:  Lowest oxygen saturation 66%, total time with oxygen saturation less than 88% is 39 minutes      Assessment & Plan     1. Essential hypertension        2. Mixed hyperlipidemia  Lipid Profile      3. High risk medication use        4. Hypokalemia        5. Prediabetes        6. Morbid obesity with BMI of 40.0-44.9, adult ([facility])            Most recent blood test showed potassium critically low at 2.9.  She is on high risk medication with chlorthalidone.  Started her on potassium supplement which she has been taking.  Repeat BMP today to monitor potassium level.  We discussed discontinuing chlorthalidone if she continues to have hypokalemia.  For now, continue valsartan 320 mg, chlorthalidone 25 mg and amlodipine 5 mg daily.      Lipid panel reviewed which shows elevated LDL and triglycerides when compared to before.  This is puzzling since she has made dietary modifications and reduced fat/carb intake and is also on Ozempic.  Obtain repeat lab in 3 months to monitor before making medication changes.  Continue Lipitor 40 mg daily.      For obesity and prediabetes, continue Ozempic.    All of patient's excellent questions were answered to the best of my knowledge and to her satisfaction.  It was a pleasure seeing Ms. Nazia Lyle in my clinic today. Return in about 3 months (around 7/13/2023). Patient is aware to call the cardiology clinic with any questions or concerns.      Leon Herman MD  [facility]  [facility], [address]  [address], [address] [address]  Phone: [phone]  Fax: [phone]

## 2023-04-18 DIAGNOSIS — I10 ESSENTIAL HYPERTENSION: ICD-10-CM

## 2023-04-18 DIAGNOSIS — Z79.899 HIGH RISK MEDICATION USE: ICD-10-CM

## 2023-04-18 DIAGNOSIS — E87.6 HYPOKALEMIA: ICD-10-CM

## 2023-04-19 DIAGNOSIS — E03.9 ACQUIRED HYPOTHYROIDISM: ICD-10-CM

## 2023-04-19 DIAGNOSIS — R73.03 PREDIABETES: ICD-10-CM

## 2023-04-20 ENCOUNTER — OFFICE VISIT (OUTPATIENT)
Dept: ENDOCRINOLOGY | Facility: MEDICAL CENTER | Age: 63
End: 2023-04-20
Payer: COMMERCIAL

## 2023-04-20 ENCOUNTER — RESEARCH ENCOUNTER (OUTPATIENT)
Dept: CARDIOLOGY | Facility: MEDICAL CENTER | Age: 63
End: 2023-04-20
Payer: COMMERCIAL

## 2023-04-20 VITALS
OXYGEN SATURATION: 99 % | DIASTOLIC BLOOD PRESSURE: 70 MMHG | BODY MASS INDEX: 40.41 KG/M2 | HEIGHT: 68 IN | SYSTOLIC BLOOD PRESSURE: 118 MMHG | WEIGHT: 266.6 LBS | HEART RATE: 89 BPM

## 2023-04-20 DIAGNOSIS — E06.3 HASHIMOTO'S DISEASE: ICD-10-CM

## 2023-04-20 DIAGNOSIS — E03.9 HYPOTHYROIDISM, ACQUIRED: ICD-10-CM

## 2023-04-20 DIAGNOSIS — Z00.6 RESEARCH STUDY PATIENT: ICD-10-CM

## 2023-04-20 DIAGNOSIS — R73.03 PREDIABETES: ICD-10-CM

## 2023-04-20 DIAGNOSIS — E78.2 MIXED HYPERLIPIDEMIA: ICD-10-CM

## 2023-04-20 DIAGNOSIS — E55.9 VITAMIN D DEFICIENCY: ICD-10-CM

## 2023-04-20 PROCEDURE — 99211 OFF/OP EST MAY X REQ PHY/QHP: CPT

## 2023-04-20 PROCEDURE — 99214 OFFICE O/P EST MOD 30 MIN: CPT

## 2023-04-20 RX ORDER — LEVOTHYROXINE SODIUM 125 MCG
125 TABLET ORAL
Qty: 90 TABLET | Refills: 4 | Status: SHIPPED
Start: 2023-04-20 | End: 2023-08-01

## 2023-04-20 RX ORDER — OMEGA-3-ACID ETHYL ESTERS 1 G/1
2 CAPSULE, LIQUID FILLED ORAL 2 TIMES DAILY
Qty: 360 CAPSULE | Refills: 3 | Status: SHIPPED | OUTPATIENT
Start: 2023-04-20

## 2023-04-20 NOTE — PROGRESS NOTES
Chief Complaint: Consult requested by Tee Cedeño M.D. for evaluation of the following conditions  HPI:   Nazia Lyle is a 63 y.o. female     Hypothyroidism, acquired:  Diagnosed many years ago.     She is currently on Synthroid 125 mcg daily -she has been out of her medication since December due to isabel in providers   She denies any recent dose changes.   She denies Good compliance and takes her thyroid hormone daily before breakfast.    She denies taking any iron, calcium supplements or antacids.  Reports multi    She reports:fatigue and palpitations     She denies weight gain, feeling cold and cold intolerance, constipation, swelling, feeling slow, losing hair, anxiousness, feeling excessive energy, tremulousness, sweating, and weight loss.    She denies lumps or enlargement in the neck.    She reports a family history of Thyroid disease.          Blood work done on 4/19/2023 showed:        2.  Hashimoto's disease:  Presumable etiology of her hypothyroidism    3.  Prediabetes:  FV by pcp   POC A1c on 4/18/2023 at 5.8%  Ozempic 0.25 mg weekly     4.  Vitamin D deficiency:  Currently not taking any supplementation -          5. Mixed hyperlipidemia:  Patient requesting courtesy prescription for her omega-3 before she sees her PCP    Patient's medications, allergies, and social histories were reviewed and updated as appropriate.      ROS:     CONS:     No fever, no chills, no weight loss, no fatigue   EYES:      No diplopia, no blurry vision, no redness of eyes, no swelling of eyelids   ENT:    No hearing loss, No ear pain, No sore throat, no dysphagia, no neck swelling   CV:     No chest pain, no palpitations, no claudication, no orthopnea, no PND   PULM:    No SOB, no cough, no hemoptysis, no wheezing    GI:   No nausea, no vomiting, no diarrhea, no constipation, no bloody stools   :  Passing urine well, no dysuria, no hematuria   ENDO:   No polyuria, no polydipsia, no heat intolerance, no cold  intolerance   NEURO: No headaches, no dizziness, no convulsions, no tremors   MUSC:  No joint swellings, no arthralgias, no myalgias, no weakness   SKIN:   No rash, no ulcers, no dry skin   PSYCH:   No depression, no anxiety, no difficulty sleeping       Past Medical History:  Patient Active Problem List    Diagnosis Date Noted    Prediabetes 10/04/2021    Hypercholesterolemia 03/31/2021    Metabolic syndrome 08/19/2020    Weight gain 04/20/2020    Inflammatory bowel disease 04/20/2020    LVH (left ventricular hypertrophy) 04/20/2020    Family history of coronary artery disease 03/17/2020    Vitamin D deficiency 11/22/2019    Hypothyroidism 08/08/2014    Elevated fasting blood sugar 08/08/2014    Hot flash, menopausal 08/08/2014    Morbid obesity with BMI of 40.0-44.9, adult (HCC) 08/08/2014    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    Essential hypertension 09/17/2012    Mixed hyperlipidemia 09/17/2012       Past Surgical History:  Past Surgical History:   Procedure Laterality Date    HARDWARE REMOVAL UPPER EXTREMITY Left 8/11/2015    Procedure: HARDWARE REMOVAL UPPER EXTREMITY;  Surgeon: Chaka Mckeon M.D.;  Location: SURGERY [facility];  Service:     APPENDECTOMY      CHOLECYSTECTOMY      PRIMARY C SECTION      TONSILLECTOMY      TONSILLECTOMY          Allergies:  Latex and Nkda [no known drug allergy]     Current Medications:    Current Outpatient Medications:     potassium chloride SA (KDUR) 20 MEQ Tab CR, Take 2 Tablets by mouth 2 times a day., Disp: 120 Tablet, Rfl: 0    atorvastatin (LIPITOR) 40 MG Tab, Take 1 Tablet by mouth every evening., Disp: 90 Tablet, Rfl: 3    SYNTHROID 125 MCG Tab, Take 1 Tablet by mouth every morning on an empty stomach., Disp: 90 Tablet, Rfl: 0    Semaglutide,0.25 or 0.5MG/DOS, (OZEMPIC, 0.25 OR 0.5 MG/DOSE,) 2 MG/3ML Solution Pen-injector, Inject 0.25 mg under the skin every 7 days., Disp: 3 mL, Rfl: 1    valsartan (DIOVAN) 320 MG tablet, take 1 tablet by mouth once  daily, Disp: 90 Tablet, Rfl: 3    chlorthalidone (HYGROTON) 25 MG Tab, take 1 tablet by mouth once daily, Disp: 90 Tablet, Rfl: 0    amLODIPine (NORVASC) 5 MG Tab, take 1 tablet by mouth once daily, Disp: 90 Tablet, Rfl: 3    Ascorbic Acid (VITAMIN C CR) 1500 MG Tab CR, , Disp: , Rfl:     Colostrum 500 MG Cap, , Disp: , Rfl:     VITAMIN D, CHOLECALCIFEROL, PO, Take  by mouth., Disp: , Rfl:     omega-3 acid ethyl esters (LOVAZA) 1 GM capsule, Take 2 Caps by mouth 2 Times a Day. (Patient not taking: Reported on 10/4/2021), Disp: 360 Cap, Rfl: 3    Multiple Vitamins-Minerals (MULTI COMPLETE PO), Take  by mouth., Disp: , Rfl:     B Complex Vitamins (VITAMIN B COMPLEX PO), Take  by mouth., Disp: , Rfl:     Probiotic Product (SOLUBLE FIBER/PROBIOTICS PO), Take  by mouth., Disp: , Rfl:     Social History:  Social History     Socioeconomic History    Marital status:      Spouse name: Not on file    Number of children: Not on file    Years of education: Not on file    Highest education level: Not on file   Occupational History    Not on file   Tobacco Use    Smoking status: Never    Smokeless tobacco: Never   Vaping Use    Vaping Use: Never used   Substance and Sexual Activity    Alcohol use: No    Drug use: No     Comment: , 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, works as human     Sexual activity: Not on file   Other Topics Concern    Not on file   Social History Narrative    Not on file     Social Determinants of Health     Financial Resource Strain: Not on file   Food Insecurity: Not on file   Transportation Needs: Not on file   Physical Activity: Not on file   Stress: Not on file   Social Connections: Not on file   Intimate Partner Violence: Not on file   Housing Stability: Not on file        Family History:   Family History   Problem Relation Age of Onset    Thyroid Mother     Diabetes Father     Hypertension Father     Diabetes Maternal Grandmother     Diabetes Maternal Grandfather      "Heart Disease Paternal Grandmother     Cancer Paternal Grandfather          PHYSICAL EXAM:   Vital signs: /70 (BP Location: Left arm, Patient Position: Sitting, BP Cuff Size: Large adult)   Pulse 89   Ht 1.727 m (5' 8\")   Wt 121 kg (266 lb 9.6 oz)   SpO2 99%   BMI 40.54 kg/m²   GENERAL: Well-developed, well-nourished  in no apparent distress.   EYE: No ocular and eyelid asymmetry, Anicteric sclerae,  PERRL  HENT: Hearing grossly intact, Normocephalic, atraumatic. Pink, moist mucous membranes, No exudate  NECK: Supple. Trachea midline. thyroid is normal in size without nodules or tenderness  CARDIOVASCULAR: Regular rate and rhythm. No murmurs, rubs, or gallops.   LUNGS: Clear to auscultation bilaterally   EXTREMITIES: No clubbing, cyanosis, or edema.   NEUROLOGICAL: Cranial nerves II-XII are grossly intact   Symmetric reflexes at the patella no proximal muscle weakness  LYMPH: No cervical, supraclavicular,  adenopathy palpated.   SKIN: No rashes, lesions. Turgor is normal.    Labs:      Imaging:      ASSESSMENT/PLAN:   1. Hypothyroidism, acquired  Clinically unstable  Biochemically unstable with high TSH levels at 10.40-this is due to the lack of medication Due to gap in providers-pt has not been taking her thyroid hormone since December 2022  Brand-name Synthroid sent to Synthroid delivers 125 mcg daily to be taken on an empty stomach  - SYNTHROID 125 MCG Tab; Take 1 Tablet by mouth every morning on an empty stomach.  Dispense: 90 Tablet; Refill: 4  - TSH; Future  - FREE THYROXINE; Future  - Comp Metabolic Panel; Future    Thyroid ultrasound ordered for evaluation  - US-THYROID; Future    2. Hashimoto's disease  Were pending antibody levels results    3. Prediabetes  Stable  Follow-up with PCP    4. Vitamin D deficiency  Stable  She is currently not taking any supplementation  Discussed with a 1000 IUs daily of vitamin D3    5. Mixed hyperlipidemia  Curtesy prescription sent to pharmacy per patient " request until she sees her PCP  - omega-3 acid ethyl esters (LOVAZA) 1 GM capsule; Take 2 Capsules by mouth 2 times a day.  Dispense: 360 Capsule; Refill: 3     Disposition: Make an appointment to follow-up in 3 months  Review blood work 2 weeks prior to next appointment    Thank you kindly for allowing me to participate in the thyroid care plan for this patient.    LYNN [email or web address].  04/20/23    CC:   Tee Cedeño M.D.

## 2023-05-01 ENCOUNTER — TELEPHONE (OUTPATIENT)
Dept: CARDIOLOGY | Facility: MEDICAL CENTER | Age: 63
End: 2023-05-01

## 2023-05-01 ENCOUNTER — NON-PROVIDER VISIT (OUTPATIENT)
Dept: CARDIOLOGY | Facility: MEDICAL CENTER | Age: 63
End: 2023-05-01
Payer: COMMERCIAL

## 2023-05-01 VITALS — WEIGHT: 264 LBS | BODY MASS INDEX: 40.14 KG/M2

## 2023-05-01 DIAGNOSIS — I10 ESSENTIAL HYPERTENSION: ICD-10-CM

## 2023-05-01 DIAGNOSIS — R73.01 ELEVATED FASTING BLOOD SUGAR: ICD-10-CM

## 2023-05-01 DIAGNOSIS — E66.01 MORBID OBESITY (HCC): ICD-10-CM

## 2023-05-01 DIAGNOSIS — E78.5 DYSLIPIDEMIA: ICD-10-CM

## 2023-05-01 PROCEDURE — 99211 OFF/OP EST MAY X REQ PHY/QHP: CPT | Performed by: INTERNAL MEDICINE

## 2023-05-01 RX ORDER — SEMAGLUTIDE 0.68 MG/ML
0.25 INJECTION, SOLUTION SUBCUTANEOUS
Qty: 3 ML | Refills: 5 | Status: SHIPPED | OUTPATIENT
Start: 2023-05-01 | End: 2023-07-24

## 2023-05-01 NOTE — TELEPHONE ENCOUNTER
Last OV: 4/13/23  Proposed Surgery: left arthrodesis, midtarsal or tarsometatarsal   Surgery Date: TBD  Requesting Office Name: [facility]  Fax Number: [phone]  Preference of Location (default is surgery center unless specified by Cardiologist or [facility])  Prior Clearance Addressed: No      Anticoags/Antiplatelets: Other none  Outstanding Cardiac Imaging : No  Stent, Cardiac Devices, or Catheterization: No  Ablation, TAVR, Cardioversion: No  Recent Cardiac Hospitalization: No            When: N/A  History (cardiac history):   Past Medical History:   Diagnosis Date    Chickenpox     Dyslipidemia 9/17/2012    Estonian measles     History of kidney stones     HTN (hypertension) 9/17/2012    Hypothyroid     Obesity 9/17/2012             Surgical Clearance Letter Sent: YES   **Scan clearance request letter into [facility].**

## 2023-05-01 NOTE — PROGRESS NOTES
Patient Consult Note    Primary care physician: Tee Cedeño M.D.    Reason for consult: Obesity/Weight Management    HPI:  Nazia Lyle is a 63 y.o. old patient who comes in today for evaluation of above stated problem.    Initial Weight: 279 lbs    Initial BMI: 42.42 kg/m2    Most Recent HbA1c:       Obesity Medication History and Current Regimen  GLP-1 Agent: Ozempic 0.25 mg subQ once weekly   -Previously tried phentermine and metformin. Pt states that metformin was only Dc'd d/t being lost to f/u w/ other wt management clinic. Pt w/ hx of IBD - can only tolerate metformin 500 mg 1/2 tab BID.    Current Exercise - Goes for walk 1-2x/week.  Exercise Goal - Increase as tolerated to goal of 150 min/week.     Dietary: Eating smaller portions more often then. Decreased appetite since initiation of Ozempic.  Breakfast - Protein bar, egg whites  Lunch - Salad, tuna  Dinner - Lean protein and vegetable  Snack - Doesn't typically snack  Dessert - Avoids as much as she can. Will indulge w/ fruit if she craves sweets. Occasional pastries.  Beverage - Water, coffee w/ cream & Stevia    Preventative Management  BP regimen (ACE/ARB) - Valsartan 320 mg once daily  ASA - None  Statin - Atorvastatin 40 mg once daily    Past Medical History:  Patient Active Problem List    Diagnosis Date Noted    Prediabetes 10/04/2021    Hypercholesterolemia 03/31/2021    Metabolic syndrome 08/19/2020    Weight gain 04/20/2020    Inflammatory bowel disease 04/20/2020    LVH (left ventricular hypertrophy) 04/20/2020    Family history of coronary artery disease 03/17/2020    Vitamin D deficiency 11/22/2019    Hypothyroidism 08/08/2014    Elevated fasting blood sugar 08/08/2014    Hot flash, menopausal 08/08/2014    Morbid obesity with BMI of 40.0-44.9, adult (HCC) 08/08/2014    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    Essential hypertension 09/17/2012    Mixed hyperlipidemia 09/17/2012       Past Surgical History:  Past Surgical  History:   Procedure Laterality Date    HARDWARE REMOVAL UPPER EXTREMITY Left 8/11/2015    Procedure: HARDWARE REMOVAL UPPER EXTREMITY;  Surgeon: Chaka Mckeon M.D.;  Location: SURGERY [facility];  Service:     APPENDECTOMY      CHOLECYSTECTOMY      PRIMARY C SECTION      TONSILLECTOMY      TONSILLECTOMY         Allergies:  Latex and Nkda [no known drug allergy]    Social History:  Social History     Socioeconomic History    Marital status:      Spouse name: Not on file    Number of children: Not on file    Years of education: Not on file    Highest education level: Not on file   Occupational History    Not on file   Tobacco Use    Smoking status: Never    Smokeless tobacco: Never   Vaping Use    Vaping Use: Never used   Substance and Sexual Activity    Alcohol use: No    Drug use: No     Comment: , 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, works as human     Sexual activity: Not on file   Other Topics Concern    Not on file   Social History Narrative    Not on file     Social Determinants of Health     Financial Resource Strain: Not on file   Food Insecurity: Not on file   Transportation Needs: Not on file   Physical Activity: Not on file   Stress: Not on file   Social Connections: Not on file   Intimate Partner Violence: Not on file   Housing Stability: Not on file       Family History:  Family History   Problem Relation Age of Onset    Thyroid Mother     Diabetes Father     Hypertension Father     Diabetes Maternal Grandmother     Diabetes Maternal Grandfather     Heart Disease Paternal Grandmother     Cancer Paternal Grandfather        Medications:    Current Outpatient Medications:     SYNTHROID 125 MCG Tab, Take 1 Tablet by mouth every morning on an empty stomach., Disp: 90 Tablet, Rfl: 4    omega-3 acid ethyl esters (LOVAZA) 1 GM capsule, Take 2 Capsules by mouth 2 times a day., Disp: 360 Capsule, Rfl: 3    potassium chloride SA (KDUR) 20 MEQ Tab CR, Take 2 Tablets by mouth 2  times a day., Disp: 120 Tablet, Rfl: 0    atorvastatin (LIPITOR) 40 MG Tab, Take 1 Tablet by mouth every evening., Disp: 90 Tablet, Rfl: 3    Semaglutide,0.25 or 0.5MG/DOS, (OZEMPIC, 0.25 OR 0.5 MG/DOSE,) 2 MG/3ML Solution Pen-injector, Inject 0.25 mg under the skin every 7 days., Disp: 3 mL, Rfl: 1    valsartan (DIOVAN) 320 MG tablet, take 1 tablet by mouth once daily, Disp: 90 Tablet, Rfl: 3    chlorthalidone (HYGROTON) 25 MG Tab, take 1 tablet by mouth once daily, Disp: 90 Tablet, Rfl: 0    amLODIPine (NORVASC) 5 MG Tab, take 1 tablet by mouth once daily, Disp: 90 Tablet, Rfl: 3    Ascorbic Acid (VITAMIN C CR) 1500 MG Tab CR, , Disp: , Rfl:     Colostrum 500 MG Cap, , Disp: , Rfl:     VITAMIN D, CHOLECALCIFEROL, PO, Take  by mouth., Disp: , Rfl:     Multiple Vitamins-Minerals (MULTI COMPLETE PO), Take  by mouth., Disp: , Rfl:     B Complex Vitamins (VITAMIN B COMPLEX PO), Take  by mouth., Disp: , Rfl:     Probiotic Product (SOLUBLE FIBER/PROBIOTICS PO), Take  by mouth., Disp: , Rfl:     Labs: Reviewed    Physical Examination:  Vital signs: There were no vitals taken for this visit. There is no height or weight on file to calculate BMI.    Assessment and Plan:    1. Obesity/Weight Management  Since pt's last appt, she was able to obtain and start Ozmepic.  Pt has lost ~ 15 lbs since our last visit.  Pt notes appetite suppression since initiation of GLP1 therapy.  Pt states she's experienced some diarrhea, but has hx of IBD so unchanged from baseline.  Medication is affordable to pt at this time.  Will continue therapy at current dosage and re-assess further titration in 3 months.    - Medication changes:  Continue Ozempic 0.25 mg subQ once weekly     - Lifestyle changes:  Diet: Maximize lean proteins and veggies. Cut out/down on carbs. Avoid simple sugars.   Exercise: Increase as tolerated    FU: 3 months    Michael tSrong, DaylinD, BCACP  05/01/23    CC:   Tee Cedeño M.D.

## 2023-05-02 DIAGNOSIS — E87.6 HYPOKALEMIA: ICD-10-CM

## 2023-05-04 RX ORDER — POTASSIUM CHLORIDE 20 MEQ/1
40 TABLET, EXTENDED RELEASE ORAL 2 TIMES DAILY
Qty: 360 TABLET | Refills: 2 | Status: SHIPPED | OUTPATIENT
Start: 2023-05-04

## 2023-05-04 NOTE — TELEPHONE ENCOUNTER
Received request via: Pharmacy    Was the patient seen in the last year in this department? Yes    Does the patient have an active prescription (recently filled or refills available) for medication(s) requested? No    Does the patient have [facility] and need 100 day supply (blood pressure, diabetes and cholesterol meds only)? Patient does not have SCP    According to last lab results from 4/18/2023 DA states to continue her potassium.     To DA: Please confirm that you would like patient to continue of 40 BID dose of K.

## 2023-05-08 DIAGNOSIS — I16.0 HYPERTENSIVE URGENCY: ICD-10-CM

## 2023-05-15 DIAGNOSIS — I16.0 HYPERTENSIVE URGENCY: ICD-10-CM

## 2023-05-17 ENCOUNTER — TELEPHONE (OUTPATIENT)
Dept: CARDIOLOGY | Facility: MEDICAL CENTER | Age: 63
End: 2023-05-17
Payer: COMMERCIAL

## 2023-05-17 RX ORDER — CHLORTHALIDONE 25 MG/1
TABLET ORAL
Qty: 90 TABLET | Refills: 0 | Status: SHIPPED | OUTPATIENT
Start: 2023-05-17 | End: 2023-08-16

## 2023-05-17 NOTE — TELEPHONE ENCOUNTER
DINO      Caller: Brenna    Medication Name and Dosage:     chlorthalidone (HYGROTON) 25 MG Tab    Medication amount left: 0    Preferred Pharmacy:     [id number]   [facility] [address]   Phone:  [phone]  Fax:  [phone]     Other questions (Topic): Pt has been trying to get refilled since last week through her pharmacy and through [facility].     Callback Number (Will only call for issues): [phone]

## 2023-05-17 NOTE — TELEPHONE ENCOUNTER
Return in about 3 months (around 7/13/2023).  90 day courtesy refill sent to pharmacy per pt request  ----------------------------------------------------------  Called pt [phone]  Advised of FU appt needed per DA OV note. Provided phone number [phone] for scheduling. Advised of 90 day refill sent to pharmacy. Pt verbalized understanding and is appreciative of call back.           chlorthalidone (HYGROTON) 25 MG Tab 90 Tablet 0/0 5/17/2023     Sig: take 1 tablet by mouth once daily    Sent to pharmacy as: Chlorthalidone 25 MG Oral Tablet (HYGROTON)    Notes to Pharmacy: Please call [phone] to schedule a follow up appointment for further refills. Thank you.    Shoaib for Ordering: Required by Leon Herman M.D.    E-Prescribing Status: Receipt confirmed by pharmacy (5/17/2023  3:47 PM PDT)      Pharmacy    [id number] [address] [facility]

## 2023-05-18 ENCOUNTER — APPOINTMENT (OUTPATIENT)
Dept: RADIOLOGY | Facility: MEDICAL CENTER | Age: 63
End: 2023-05-18
Payer: COMMERCIAL

## 2023-05-18 RX ORDER — CHLORTHALIDONE 25 MG/1
25 TABLET ORAL DAILY
Qty: 90 TABLET | Refills: 0 | OUTPATIENT
Start: 2023-05-18

## 2023-05-24 ENCOUNTER — APPOINTMENT (OUTPATIENT)
Dept: ADMISSIONS | Facility: MEDICAL CENTER | Age: 63
End: 2023-05-24
Attending: ORTHOPAEDIC SURGERY
Payer: COMMERCIAL

## 2023-05-25 ENCOUNTER — PRE-ADMISSION TESTING (OUTPATIENT)
Dept: ADMISSIONS | Facility: MEDICAL CENTER | Age: 63
End: 2023-05-25
Attending: ORTHOPAEDIC SURGERY
Payer: COMMERCIAL

## 2023-05-25 NOTE — PREPROCEDURE INSTRUCTIONS
"Preadmit appointment: \" Preparing for your Procedure information\" sheet reviewed with patient with verbal and written instructions- emailed. Patient instructed to continue prescribed medications through the day before surgery, instructed to take the following medications the day of surgery per anesthesia protocol: Synthroid  Patient instructed to call his doctor doing procedure/ surgery if any illness develops prior to surgery/procedure. METS >4.  Pt reports history of PONV, advised to discuss with anesthesia day of surgery  "

## 2023-05-29 LAB
APOB+LDLR+PCSK9 GENE MUT ANL BLD/T: NOT DETECTED
BRCA1+BRCA2 DEL+DUP + FULL MUT ANL BLD/T: NOT DETECTED
MLH1+MSH2+MSH6+PMS2 GN DEL+DUP+FUL M: NOT DETECTED

## 2023-06-07 ENCOUNTER — APPOINTMENT (RX ONLY)
Dept: URBAN - METROPOLITAN AREA CLINIC 36 | Facility: CLINIC | Age: 63
Setting detail: DERMATOLOGY
End: 2023-06-07

## 2023-06-07 ENCOUNTER — PRE-ADMISSION TESTING (OUTPATIENT)
Dept: ADMISSIONS | Facility: MEDICAL CENTER | Age: 63
End: 2023-06-07
Attending: ORTHOPAEDIC SURGERY
Payer: COMMERCIAL

## 2023-06-07 DIAGNOSIS — Z01.810 PRE-OPERATIVE CARDIOVASCULAR EXAMINATION: ICD-10-CM

## 2023-06-07 DIAGNOSIS — Z41.9 ENCOUNTER FOR PROCEDURE FOR PURPOSES OTHER THAN REMEDYING HEALTH STATE, UNSPECIFIED: ICD-10-CM

## 2023-06-07 DIAGNOSIS — Z01.812 PRE-OPERATIVE LABORATORY EXAMINATION: ICD-10-CM

## 2023-06-07 LAB
ALBUMIN SERPL BCP-MCNC: 4.3 G/DL (ref 3.2–4.9)
ALBUMIN/GLOB SERPL: 1.4 G/DL
ALP SERPL-CCNC: 123 U/L (ref 30–99)
ALT SERPL-CCNC: 46 U/L (ref 2–50)
ANION GAP SERPL CALC-SCNC: 13 MMOL/L (ref 7–16)
AST SERPL-CCNC: 36 U/L (ref 12–45)
BILIRUB SERPL-MCNC: 0.7 MG/DL (ref 0.1–1.5)
BUN SERPL-MCNC: 15 MG/DL (ref 8–22)
CALCIUM ALBUM COR SERPL-MCNC: 9.6 MG/DL (ref 8.5–10.5)
CALCIUM SERPL-MCNC: 9.8 MG/DL (ref 8.4–10.2)
CHLORIDE SERPL-SCNC: 101 MMOL/L (ref 96–112)
CO2 SERPL-SCNC: 26 MMOL/L (ref 20–33)
CREAT SERPL-MCNC: 0.55 MG/DL (ref 0.5–1.4)
EKG IMPRESSION: NORMAL
ERYTHROCYTE [DISTWIDTH] IN BLOOD BY AUTOMATED COUNT: 43.9 FL (ref 35.9–50)
EST. AVERAGE GLUCOSE BLD GHB EST-MCNC: 114 MG/DL
GFR SERPLBLD CREATININE-BSD FMLA CKD-EPI: 103 ML/MIN/1.73 M 2
GLOBULIN SER CALC-MCNC: 3.1 G/DL (ref 1.9–3.5)
GLUCOSE SERPL-MCNC: 92 MG/DL (ref 65–99)
HBA1C MFR BLD: 5.6 % (ref 4–5.6)
HCT VFR BLD AUTO: 44.2 % (ref 37–47)
HGB BLD-MCNC: 14.5 G/DL (ref 12–16)
MCH RBC QN AUTO: 29.2 PG (ref 27–33)
MCHC RBC AUTO-ENTMCNC: 32.8 G/DL (ref 32.2–35.5)
MCV RBC AUTO: 89.1 FL (ref 81.4–97.8)
PLATELET # BLD AUTO: 390 K/UL (ref 164–446)
PMV BLD AUTO: 10.1 FL (ref 9–12.9)
POTASSIUM SERPL-SCNC: 4.3 MMOL/L (ref 3.6–5.5)
PROT SERPL-MCNC: 7.4 G/DL (ref 6–8.2)
RBC # BLD AUTO: 4.96 M/UL (ref 4.2–5.4)
SCCMEC + MECA PNL NOSE NAA+PROBE: NEGATIVE
SCCMEC + MECA PNL NOSE NAA+PROBE: NEGATIVE
SODIUM SERPL-SCNC: 140 MMOL/L (ref 135–145)
WBC # BLD AUTO: 6.6 K/UL (ref 4.8–10.8)

## 2023-06-07 PROCEDURE — 80053 COMPREHEN METABOLIC PANEL: CPT

## 2023-06-07 PROCEDURE — 85027 COMPLETE CBC AUTOMATED: CPT

## 2023-06-07 PROCEDURE — ? BOTOX

## 2023-06-07 PROCEDURE — 93010 ELECTROCARDIOGRAM REPORT: CPT | Performed by: INTERNAL MEDICINE

## 2023-06-07 PROCEDURE — 93005 ELECTROCARDIOGRAM TRACING: CPT

## 2023-06-07 PROCEDURE — 36415 COLL VENOUS BLD VENIPUNCTURE: CPT

## 2023-06-07 PROCEDURE — 87640 STAPH A DNA AMP PROBE: CPT

## 2023-06-07 PROCEDURE — 83036 HEMOGLOBIN GLYCOSYLATED A1C: CPT

## 2023-06-07 PROCEDURE — 87641 MR-STAPH DNA AMP PROBE: CPT

## 2023-06-07 NOTE — PROCEDURE: BOTOX
Expiration Date (Month Year): 05/2025
Left Pupillary Line Units: 0
Show Additional Area 2: Yes
Post-Care Instructions: Patient instructed to not lie down for 4 hours and limit physical activity for 24 hours. Patient instructed not to travel by airplane for 48 hours.
Detail Level: Detailed
Additional Area 3 Location: masseter
Price (Use Numbers Only, No Special Characters Or $): 0.00
Show Lcl Units: No
Lot #: [id number]
Glabellar Complex Units: 20
Consent: Written consent obtained. Risks include but not limited to lid/brow ptosis, bruising, swelling, diplopia, temporary effect, incomplete chemical denervation.
Forehead Units: 10
Dilution (U/0.1 Cc): 0.6
Additional Area 1 Location: upper lip
Depressor Anguli Oris Units: 6
Additional Area 2 Location: chin
Periorbital Skin Units: 14

## 2023-06-10 ENCOUNTER — HOSPITAL ENCOUNTER (OUTPATIENT)
Dept: RADIOLOGY | Facility: MEDICAL CENTER | Age: 63
End: 2023-06-10
Payer: COMMERCIAL

## 2023-06-10 DIAGNOSIS — E03.9 HYPOTHYROIDISM, ACQUIRED: ICD-10-CM

## 2023-06-10 PROCEDURE — 76536 US EXAM OF HEAD AND NECK: CPT

## 2023-06-21 ENCOUNTER — ANESTHESIA EVENT (OUTPATIENT)
Dept: SURGERY | Facility: MEDICAL CENTER | Age: 63
End: 2023-06-21
Payer: COMMERCIAL

## 2023-06-22 ENCOUNTER — HOSPITAL ENCOUNTER (OUTPATIENT)
Facility: MEDICAL CENTER | Age: 63
End: 2023-06-22
Attending: ORTHOPAEDIC SURGERY | Admitting: ORTHOPAEDIC SURGERY
Payer: COMMERCIAL

## 2023-06-22 ENCOUNTER — APPOINTMENT (OUTPATIENT)
Dept: RADIOLOGY | Facility: MEDICAL CENTER | Age: 63
End: 2023-06-22
Attending: ORTHOPAEDIC SURGERY
Payer: COMMERCIAL

## 2023-06-22 ENCOUNTER — ANESTHESIA (OUTPATIENT)
Dept: SURGERY | Facility: MEDICAL CENTER | Age: 63
End: 2023-06-22
Payer: COMMERCIAL

## 2023-06-22 VITALS
SYSTOLIC BLOOD PRESSURE: 147 MMHG | HEIGHT: 68 IN | RESPIRATION RATE: 16 BRPM | DIASTOLIC BLOOD PRESSURE: 79 MMHG | BODY MASS INDEX: 38.52 KG/M2 | TEMPERATURE: 96.8 F | HEART RATE: 95 BPM | OXYGEN SATURATION: 94 % | WEIGHT: 254.19 LBS

## 2023-06-22 LAB — GLUCOSE BLD STRIP.AUTO-MCNC: 106 MG/DL (ref 65–99)

## 2023-06-22 PROCEDURE — 01480 ANES OPEN PX LOWER L/A/F NOS: CPT | Performed by: ANESTHESIOLOGY

## 2023-06-22 PROCEDURE — 82962 GLUCOSE BLOOD TEST: CPT

## 2023-06-22 PROCEDURE — 700111 HCHG RX REV CODE 636 W/ 250 OVERRIDE (IP): Performed by: ANESTHESIOLOGY

## 2023-06-22 PROCEDURE — C1713 ANCHOR/SCREW BN/BN,TIS/BN: HCPCS | Performed by: ORTHOPAEDIC SURGERY

## 2023-06-22 PROCEDURE — A9270 NON-COVERED ITEM OR SERVICE: HCPCS | Performed by: ANESTHESIOLOGY

## 2023-06-22 PROCEDURE — 160041 HCHG SURGERY MINUTES - EA ADDL 1 MIN LEVEL 4: Performed by: ORTHOPAEDIC SURGERY

## 2023-06-22 PROCEDURE — 700105 HCHG RX REV CODE 258: Performed by: ORTHOPAEDIC SURGERY

## 2023-06-22 PROCEDURE — 160002 HCHG RECOVERY MINUTES (STAT): Performed by: ORTHOPAEDIC SURGERY

## 2023-06-22 PROCEDURE — 160036 HCHG PACU - EA ADDL 30 MINS PHASE I: Performed by: ORTHOPAEDIC SURGERY

## 2023-06-22 PROCEDURE — 73630 X-RAY EXAM OF FOOT: CPT | Mod: LT

## 2023-06-22 PROCEDURE — 64445 NJX AA&/STRD SCIATIC NRV IMG: CPT | Mod: 59,LT | Performed by: ANESTHESIOLOGY

## 2023-06-22 PROCEDURE — 160009 HCHG ANES TIME/MIN: Performed by: ORTHOPAEDIC SURGERY

## 2023-06-22 PROCEDURE — 160035 HCHG PACU - 1ST 60 MINS PHASE I: Performed by: ORTHOPAEDIC SURGERY

## 2023-06-22 PROCEDURE — 160048 HCHG OR STATISTICAL LEVEL 1-5: Performed by: ORTHOPAEDIC SURGERY

## 2023-06-22 PROCEDURE — 160046 HCHG PACU - 1ST 60 MINS PHASE II: Performed by: ORTHOPAEDIC SURGERY

## 2023-06-22 PROCEDURE — 160029 HCHG SURGERY MINUTES - 1ST 30 MINS LEVEL 4: Performed by: ORTHOPAEDIC SURGERY

## 2023-06-22 PROCEDURE — 64447 NJX AA&/STRD FEMORAL NRV IMG: CPT | Performed by: ORTHOPAEDIC SURGERY

## 2023-06-22 PROCEDURE — 700102 HCHG RX REV CODE 250 W/ 637 OVERRIDE(OP): Performed by: ANESTHESIOLOGY

## 2023-06-22 PROCEDURE — 700101 HCHG RX REV CODE 250: Performed by: ANESTHESIOLOGY

## 2023-06-22 PROCEDURE — 64445 NJX AA&/STRD SCIATIC NRV IMG: CPT | Performed by: ORTHOPAEDIC SURGERY

## 2023-06-22 PROCEDURE — 160025 RECOVERY II MINUTES (STATS): Performed by: ORTHOPAEDIC SURGERY

## 2023-06-22 PROCEDURE — 64447 NJX AA&/STRD FEMORAL NRV IMG: CPT | Mod: 59,LT | Performed by: ANESTHESIOLOGY

## 2023-06-22 DEVICE — IMPLANTABLE DEVICE: Type: IMPLANTABLE DEVICE | Site: FOOT | Status: FUNCTIONAL

## 2023-06-22 RX ORDER — HYDROMORPHONE HYDROCHLORIDE 1 MG/ML
0.2 INJECTION, SOLUTION INTRAMUSCULAR; INTRAVENOUS; SUBCUTANEOUS
Status: DISCONTINUED | OUTPATIENT
Start: 2023-06-22 | End: 2023-06-22 | Stop reason: HOSPADM

## 2023-06-22 RX ORDER — HYDROMORPHONE HYDROCHLORIDE 1 MG/ML
0.4 INJECTION, SOLUTION INTRAMUSCULAR; INTRAVENOUS; SUBCUTANEOUS
Status: DISCONTINUED | OUTPATIENT
Start: 2023-06-22 | End: 2023-06-22 | Stop reason: HOSPADM

## 2023-06-22 RX ORDER — OXYCODONE HCL 5 MG/5 ML
10 SOLUTION, ORAL ORAL
Status: COMPLETED | OUTPATIENT
Start: 2023-06-22 | End: 2023-06-22

## 2023-06-22 RX ORDER — ONDANSETRON 2 MG/ML
INJECTION INTRAMUSCULAR; INTRAVENOUS PRN
Status: DISCONTINUED | OUTPATIENT
Start: 2023-06-22 | End: 2023-06-22 | Stop reason: SURG

## 2023-06-22 RX ORDER — MIDAZOLAM HYDROCHLORIDE 1 MG/ML
INJECTION INTRAMUSCULAR; INTRAVENOUS PRN
Status: DISCONTINUED | OUTPATIENT
Start: 2023-06-22 | End: 2023-06-22 | Stop reason: SURG

## 2023-06-22 RX ORDER — ACETAMINOPHEN 500 MG
1000 TABLET ORAL ONCE
Status: COMPLETED | OUTPATIENT
Start: 2023-06-22 | End: 2023-06-22

## 2023-06-22 RX ORDER — BUPIVACAINE HYDROCHLORIDE AND EPINEPHRINE 2.5; 5 MG/ML; UG/ML
INJECTION, SOLUTION EPIDURAL; INFILTRATION; INTRACAUDAL; PERINEURAL
Status: COMPLETED | OUTPATIENT
Start: 2023-06-22 | End: 2023-06-22

## 2023-06-22 RX ORDER — DEXMEDETOMIDINE HYDROCHLORIDE 100 UG/ML
INJECTION, SOLUTION INTRAVENOUS PRN
Status: DISCONTINUED | OUTPATIENT
Start: 2023-06-22 | End: 2023-06-22 | Stop reason: SURG

## 2023-06-22 RX ORDER — IPRATROPIUM BROMIDE AND ALBUTEROL SULFATE 2.5; .5 MG/3ML; MG/3ML
3 SOLUTION RESPIRATORY (INHALATION)
Status: DISCONTINUED | OUTPATIENT
Start: 2023-06-22 | End: 2023-06-22 | Stop reason: HOSPADM

## 2023-06-22 RX ORDER — HYDROMORPHONE HYDROCHLORIDE 1 MG/ML
0.1 INJECTION, SOLUTION INTRAMUSCULAR; INTRAVENOUS; SUBCUTANEOUS
Status: DISCONTINUED | OUTPATIENT
Start: 2023-06-22 | End: 2023-06-22 | Stop reason: HOSPADM

## 2023-06-22 RX ORDER — HYDRALAZINE HYDROCHLORIDE 20 MG/ML
5 INJECTION INTRAMUSCULAR; INTRAVENOUS
Status: DISCONTINUED | OUTPATIENT
Start: 2023-06-22 | End: 2023-06-22 | Stop reason: HOSPADM

## 2023-06-22 RX ORDER — OXYCODONE HCL 5 MG/5 ML
5 SOLUTION, ORAL ORAL
Status: COMPLETED | OUTPATIENT
Start: 2023-06-22 | End: 2023-06-22

## 2023-06-22 RX ORDER — OXYCODONE HYDROCHLORIDE 5 MG/1
5 TABLET ORAL EVERY 4 HOURS
COMMUNITY
End: 2023-08-01

## 2023-06-22 RX ORDER — BUPIVACAINE HYDROCHLORIDE AND EPINEPHRINE 5; 5 MG/ML; UG/ML
INJECTION, SOLUTION EPIDURAL; INTRACAUDAL; PERINEURAL
Status: COMPLETED | OUTPATIENT
Start: 2023-06-22 | End: 2023-06-22

## 2023-06-22 RX ORDER — SODIUM CHLORIDE, SODIUM LACTATE, POTASSIUM CHLORIDE, CALCIUM CHLORIDE 600; 310; 30; 20 MG/100ML; MG/100ML; MG/100ML; MG/100ML
INJECTION, SOLUTION INTRAVENOUS CONTINUOUS
Status: DISCONTINUED | OUTPATIENT
Start: 2023-06-22 | End: 2023-06-22 | Stop reason: HOSPADM

## 2023-06-22 RX ORDER — ONDANSETRON 2 MG/ML
4 INJECTION INTRAMUSCULAR; INTRAVENOUS
Status: DISCONTINUED | OUTPATIENT
Start: 2023-06-22 | End: 2023-06-22 | Stop reason: HOSPADM

## 2023-06-22 RX ORDER — EPHEDRINE SULFATE 50 MG/ML
INJECTION, SOLUTION INTRAVENOUS PRN
Status: DISCONTINUED | OUTPATIENT
Start: 2023-06-22 | End: 2023-06-22 | Stop reason: SURG

## 2023-06-22 RX ORDER — SUCCINYLCHOLINE CHLORIDE 20 MG/ML
INJECTION INTRAMUSCULAR; INTRAVENOUS PRN
Status: DISCONTINUED | OUTPATIENT
Start: 2023-06-22 | End: 2023-06-22 | Stop reason: SURG

## 2023-06-22 RX ORDER — DEXAMETHASONE SODIUM PHOSPHATE 4 MG/ML
INJECTION, SOLUTION INTRA-ARTICULAR; INTRALESIONAL; INTRAMUSCULAR; INTRAVENOUS; SOFT TISSUE PRN
Status: DISCONTINUED | OUTPATIENT
Start: 2023-06-22 | End: 2023-06-22 | Stop reason: SURG

## 2023-06-22 RX ORDER — CELECOXIB 200 MG/1
200 CAPSULE ORAL ONCE
Status: COMPLETED | OUTPATIENT
Start: 2023-06-22 | End: 2023-06-22

## 2023-06-22 RX ORDER — TRAMADOL HYDROCHLORIDE 50 MG/1
50 TABLET ORAL EVERY 6 HOURS PRN
COMMUNITY
End: 2024-02-23

## 2023-06-22 RX ORDER — DIPHENHYDRAMINE HYDROCHLORIDE 50 MG/ML
12.5 INJECTION INTRAMUSCULAR; INTRAVENOUS
Status: DISCONTINUED | OUTPATIENT
Start: 2023-06-22 | End: 2023-06-22 | Stop reason: HOSPADM

## 2023-06-22 RX ORDER — MEPERIDINE HYDROCHLORIDE 25 MG/ML
12.5 INJECTION INTRAMUSCULAR; INTRAVENOUS; SUBCUTANEOUS
Status: DISCONTINUED | OUTPATIENT
Start: 2023-06-22 | End: 2023-06-22 | Stop reason: HOSPADM

## 2023-06-22 RX ORDER — EPHEDRINE SULFATE 50 MG/ML
5 INJECTION, SOLUTION INTRAVENOUS
Status: DISCONTINUED | OUTPATIENT
Start: 2023-06-22 | End: 2023-06-22 | Stop reason: HOSPADM

## 2023-06-22 RX ORDER — CEFAZOLIN SODIUM 1 G/3ML
INJECTION, POWDER, FOR SOLUTION INTRAMUSCULAR; INTRAVENOUS PRN
Status: DISCONTINUED | OUTPATIENT
Start: 2023-06-22 | End: 2023-06-22 | Stop reason: SURG

## 2023-06-22 RX ORDER — PHENYLEPHRINE HCL IN 0.9% NACL 0.5 MG/5ML
SYRINGE (ML) INTRAVENOUS PRN
Status: DISCONTINUED | OUTPATIENT
Start: 2023-06-22 | End: 2023-06-22 | Stop reason: SURG

## 2023-06-22 RX ORDER — HALOPERIDOL 5 MG/ML
1 INJECTION INTRAMUSCULAR
Status: DISCONTINUED | OUTPATIENT
Start: 2023-06-22 | End: 2023-06-22 | Stop reason: HOSPADM

## 2023-06-22 RX ADMIN — FENTANYL CITRATE 25 MCG: 50 INJECTION, SOLUTION INTRAMUSCULAR; INTRAVENOUS at 08:57

## 2023-06-22 RX ADMIN — Medication 300 MCG: at 08:22

## 2023-06-22 RX ADMIN — Medication 200 MCG: at 08:05

## 2023-06-22 RX ADMIN — FENTANYL CITRATE 50 MCG: 50 INJECTION, SOLUTION INTRAMUSCULAR; INTRAVENOUS at 07:08

## 2023-06-22 RX ADMIN — ACETAMINOPHEN 1000 MG: 500 TABLET ORAL at 06:34

## 2023-06-22 RX ADMIN — FENTANYL CITRATE 50 MCG: 50 INJECTION, SOLUTION INTRAMUSCULAR; INTRAVENOUS at 07:42

## 2023-06-22 RX ADMIN — FENTANYL CITRATE 50 MCG: 50 INJECTION, SOLUTION INTRAMUSCULAR; INTRAVENOUS at 07:55

## 2023-06-22 RX ADMIN — FENTANYL CITRATE 25 MCG: 50 INJECTION, SOLUTION INTRAMUSCULAR; INTRAVENOUS at 08:49

## 2023-06-22 RX ADMIN — DEXAMETHASONE SODIUM PHOSPHATE 8 MG: 4 INJECTION INTRA-ARTICULAR; INTRALESIONAL; INTRAMUSCULAR; INTRAVENOUS; SOFT TISSUE at 07:46

## 2023-06-22 RX ADMIN — FENTANYL CITRATE 50 MCG: 50 INJECTION, SOLUTION INTRAMUSCULAR; INTRAVENOUS at 08:06

## 2023-06-22 RX ADMIN — OXYCODONE HYDROCHLORIDE 5 MG: 5 SOLUTION ORAL at 08:49

## 2023-06-22 RX ADMIN — CELECOXIB 200 MG: 200 CAPSULE ORAL at 06:34

## 2023-06-22 RX ADMIN — SUCCINYLCHOLINE CHLORIDE 120 MG: 20 INJECTION, SOLUTION INTRAMUSCULAR; INTRAVENOUS at 07:38

## 2023-06-22 RX ADMIN — EPHEDRINE SULFATE 10 MG: 50 INJECTION, SOLUTION INTRAVENOUS at 07:44

## 2023-06-22 RX ADMIN — SODIUM CHLORIDE, POTASSIUM CHLORIDE, SODIUM LACTATE AND CALCIUM CHLORIDE: 600; 310; 30; 20 INJECTION, SOLUTION INTRAVENOUS at 06:34

## 2023-06-22 RX ADMIN — Medication 200 MCG: at 07:54

## 2023-06-22 RX ADMIN — EPHEDRINE SULFATE 15 MG: 50 INJECTION, SOLUTION INTRAVENOUS at 07:48

## 2023-06-22 RX ADMIN — EPHEDRINE SULFATE 15 MG: 50 INJECTION, SOLUTION INTRAVENOUS at 07:59

## 2023-06-22 RX ADMIN — DEXMEDETOMIDINE 25 MCG: 100 INJECTION, SOLUTION INTRAVENOUS at 07:40

## 2023-06-22 RX ADMIN — FENTANYL CITRATE 25 MCG: 50 INJECTION, SOLUTION INTRAMUSCULAR; INTRAVENOUS at 09:15

## 2023-06-22 RX ADMIN — BUPIVACAINE HYDROCHLORIDE AND EPINEPHRINE 20 ML: 5; 5 INJECTION, SOLUTION EPIDURAL; INTRACAUDAL; PERINEURAL at 07:08

## 2023-06-22 RX ADMIN — BUPIVACAINE HYDROCHLORIDE AND EPINEPHRINE BITARTRATE 15 ML: 2.5; .0091 INJECTION, SOLUTION EPIDURAL; INFILTRATION; INTRACAUDAL; PERINEURAL at 07:08

## 2023-06-22 RX ADMIN — Medication 300 MCG: at 08:14

## 2023-06-22 RX ADMIN — MIDAZOLAM 2 MG: 1 INJECTION, SOLUTION INTRAMUSCULAR; INTRAVENOUS at 07:08

## 2023-06-22 RX ADMIN — SODIUM CHLORIDE, POTASSIUM CHLORIDE, SODIUM LACTATE AND CALCIUM CHLORIDE: 600; 310; 30; 20 INJECTION, SOLUTION INTRAVENOUS at 07:54

## 2023-06-22 RX ADMIN — ONDANSETRON 4 MG: 2 INJECTION INTRAMUSCULAR; INTRAVENOUS at 08:13

## 2023-06-22 RX ADMIN — PROPOFOL 200 MG: 10 INJECTION, EMULSION INTRAVENOUS at 07:38

## 2023-06-22 RX ADMIN — CEFAZOLIN 2 G: 1 INJECTION, POWDER, FOR SOLUTION INTRAMUSCULAR; INTRAVENOUS at 07:38

## 2023-06-22 ASSESSMENT — PAIN DESCRIPTION - PAIN TYPE
TYPE: SURGICAL PAIN

## 2023-06-22 ASSESSMENT — FIBROSIS 4 INDEX: FIB4 SCORE: 0.86

## 2023-06-22 ASSESSMENT — PAIN SCALES - GENERAL: PAIN_LEVEL: 4

## 2023-06-22 NOTE — OR NURSING
0535: Brought patient back to pre-op and assumed care.   0637: Patient allergies and NPO status verified, home medication reconciliation completed and belongings secured. Patient verbalizes understanding of pain scale, expected course of stay and plan of care. Surgical site verified with patient. IV access established. Sequentials placed on legs.

## 2023-06-22 NOTE — ANESTHESIA POSTPROCEDURE EVALUATION
Patient: Nazia Lyle    Procedure Summary     Date: 06/22/23 Room / Location:  OR  / SURGERY [facility]    Anesthesia Start: 0733 Anesthesia Stop: 0835    Procedure: LEFT ARTHRODESIS MIDTARASAL OR TARSOMETATARSAL MIDFOOT FUSION SINGLE JOINT WITH NEUROLYSIS OF DEEP PERONEAL NERVE; FUSE LEFT MIDFOOT AND DECOMPRESS NERVE USING HARDWARE AS NECESSARY AND REPAIRS AS INDICATED Diagnosis: (LOCALIZED PRIMARY OSTEOARTHRITIS OF ANKLE AND/OR FOOT)    Surgeons: Wm Jose Ro M.D. Responsible Provider: Harper Simons M.D.    Anesthesia Type: general ASA Status: 3          Final Anesthesia Type: general  Last vitals  BP   Blood Pressure: (!) 147/79    Temp   36 °C (96.8 °F)    Pulse   95   Resp   16    SpO2   94 %      Anesthesia Post Evaluation    Patient location during evaluation: PACU  Patient participation: complete - patient participated  Level of consciousness: awake and alert  Pain score: 4    Airway patency: patent  Anesthetic complications: no  Cardiovascular status: hemodynamically stable  Respiratory status: acceptable  Hydration status: euvolemic    PONV: none          No notable events documented.     Nurse Pain Score: 4 (NPRS)

## 2023-06-22 NOTE — OR NURSING
0951: Pt arrive to stage 2 via gurney. Dressed and up to chair. Denies pain and nausea. Stable on RA.     1025: Family present. DC education provided to pt and pt family, both verbalized understanding. Pt able to void prior to DC home. D/Bj to care of family post uneventful stay in PACU 2. Assisted out to car in .

## 2023-06-22 NOTE — DISCHARGE INSTRUCTIONS
You are non-weightbearing on the left foot.   You can shower with wound covered - begin post-op day # 3 (Sunday 6/25/23)    ACTIVITY: Rest and take it easy for the first 24 hours.  A responsible adult is recommended to remain with you during that time.  It is normal to feel sleepy.  We encourage you to not do anything that requires balance, judgment or coordination.    MILD FLU-LIKE SYMPTOMS ARE NORMAL. YOU MAY EXPERIENCE GENERALIZED MUSCLE ACHES, THROAT IRRITATION, HEADACHE AND/OR SOME NAUSEA.    FOR 24 HOURS DO NOT:  Drive, operate machinery or run household appliances.  Drink beer or alcoholic beverages.   Make important decisions or sign legal documents.    DIET: To avoid nausea, slowly advance diet as tolerated, avoiding spicy or greasy foods for the first day.  Add more substantial food to your diet according to your physician's instructions. INCREASE FLUIDS AND FIBER TO AVOID CONSTIPATION.      FOLLOW-UP APPOINTMENT:  A follow-up appointment should be arranged with your doctor; call to schedule.    You should CALL YOUR PHYSICIAN if you develop:  Fever greater than 101 degrees F.  Pain not relieved by medication, or persistent nausea or vomiting.  Excessive bleeding (blood soaking through dressing) or unexpected drainage from the wound.  Extreme redness or swelling around the incision site, drainage of pus or foul smelling drainage.  Inability to urinate or empty your bladder within 8 hours.  Problems with breathing or chest pain.    You should call 911 if you develop problems with breathing or chest pain.  If you are unable to contact your doctor or surgical center, you should go to the nearest emergency room or urgent care center.  Physician's telephone #: [phone]    If any questions arise, call your doctor.  If your doctor is not available, please feel free to call the Surgical Center at [phone].     A registered nurse may call you a few days after your surgery to see how you are doing after  your procedure.    MEDICATIONS: Resume taking daily medication.  Take prescribed pain medication with food.  If no medication is prescribed, you may take non-aspirin pain medication if needed.  PAIN MEDICATION CAN BE VERY CONSTIPATING.  Take a stool softener or laxative such as senokot, pericolace, or milk of magnesia if needed.    Last pain medication given at 8:49am Oxycodone 5mg.    If your physician has prescribed pain medication that includes Acetaminophen (Tylenol), do not take additional Acetaminophen (Tylenol) while taking the prescribed medication.    Peripheral Nerve Block Discharge Instructions from Same Day Surgery and Inpatient :    What to Expect - Lower Extremity  The block may cause you to experience numbness and weakness in your hip and thigh, thigh and knee, or calf and foot on the same side as your surgery  Numbness, tingling and / or weakness are all normal. For some people, this may be an unpleasant sensation  These issues will be resolved when the local anesthetic wears off   You may experience numbness and tingling in your thigh on the same side as your surgery if the block medicine was injected at your groin area  Numbness will make it difficult to walk  You may have problems with balance and walking so be very careful   Follow your surgeon's direction regarding weight bearing on your surgical limb  Be very careful with your numb limb  Precautions  The numbness may affect your balance  Be careful when walking or moving around  Your leg may be weak: be very careful putting weight on it  If your surgeon did not specify a time, you should not bear weight for 24 hours  Be sure to ask for help when you need it  It is better to have help than to fall and hurt yourself  Prevent Injury  Protect the limb like a baby  Beware of exposing your limb to extreme heat or cold or trauma  The limb may be injured without you noticing because it is numb  Keep the limb elevated whenever possible  Do not sleep on  "the limb  Change the position of the limb regularly  Avoid putting pressure on your surgical limb  Pain Control  The initial block on the day of surgery will make your extremity feel \"numb\"  Any consecutive injection including prior to discharge from the hospital will make your extremity feel \"numb\"  You may feel an aching or burning when the local anesthesia starts to wear off  Take pain pills as prescribed by your surgeon  Call your surgeon or anesthesiologist if you do not have adequate pain control      Arthroscopic Ankle Fusion, Care After  This sheet gives you information about how to care for yourself after your procedure. Your health care provider may also give you more specific instructions. If you have problems or questions, contact your health care provider.  What can I expect after the procedure?  After the procedure, it is common to have:  Pain.  Swelling.  A cast, splint, or boot on your foot and lower leg.  Follow these instructions at home:  If you have a cast:  Do not stick anything inside the cast to scratch your skin. Doing that increases your risk of infection.  Check the skin around the cast every day. Tell your health care provider about any concerns.  You may put lotion on dry skin around the edges of the cast. Do not put lotion on the skin underneath the cast.  Keep the cast clean.  If the cast is not waterproof:  Do not let it get wet.  Cover it with a watertight covering when you take a bath or shower.  If you have a splint or boot:  Wear the splint or boot as told by your health care provider. Remove it only as told by your health care provider.  Loosen the splint or boot if your toes tingle, become numb, or turn cold and blue.  Keep the splint or boot clean.  If the splint or boot is not waterproof:  Do not let it get wet.  Cover it with a watertight covering when you take a bath or a shower.  Incision care    Follow instructions from your health care provider about how to take care of " your incisions. Make sure you:  Wash your hands with soap and water before you change your bandage (dressing). If soap and water are not available, use hand .  Change your dressing as told by your health care provider.  Leave stitches (sutures), skin glue, or adhesive strips in place. These skin closures may need to stay in place for 2 weeks or longer. If adhesive strip edges start to loosen and curl up, you may trim the loose edges. Do not remove adhesive strips completely unless your health care provider tells you to do that.  After your cast, splint, or boot has been removed, check your incision areas every day for signs of infection. Check for:  More redness, swelling, or pain.  Fluid or blood.  Warmth.  Pus or a bad smell.  Do not take baths, swim, or use a hot tub until your health care provider approves. Ask your health care provider if you may take showers. You may only be allowed to take sponge baths.  Managing pain, stiffness, and swelling    If directed, put ice on the injured area.  If you have a removable splint or boot, remove it as told by your health care provider.  Put ice in a plastic bag.  Place a towel between your skin and the bag or between your cast and the bag.  Leave the ice on for 20 minutes, 2-3 times per day.  Move your toes often to avoid stiffness and to lessen swelling.  Raise (elevate) the injured area above the level of your heart while you are sitting or lying down.  Medicines  Take over-the-counter and prescription medicines only as told by your health care provider.  If you were prescribed pain medicine, take steps to prevent or treat constipation. Your health care provider may recommend that you:  Take over-the-counter or prescription medicines.  Eat foods that are high in fiber, such as beans, whole grains, and fresh fruits and vegetables.  Limit foods that are high in fat and processed sugars, such as fried or sweet foods.  Driving  Do not drive or use heavy machinery  while taking prescription pain medicine.  Do not drive for 24 hours if you were given a sedative during your procedure.  Ask your health care provider when it is safe to drive if you have a cast, splint, or boot.  Safety  Do not use the injured limb to support your body weight until your health care provider says that you can. Use crutches or a walker as told by your health care provider.  To prevent falls, remove all throw rugs, electric cords, or other trip hazards from your house.  General instructions  If you have a cast or splint, do not put pressure on any part of the cast or splint until it is fully hardened. This may take several hours.  Do not use any products that contain nicotine or tobacco, such as cigarettes, e-cigarettes, and chewing tobacco. These can delay bone healing after surgery. If you need help quitting, ask your health care provider.  Drink enough fluid to keep your urine pale yellow.  Return to your normal activities as told by your health care provider. Ask your health care provider what activities are safe for you.  Keep all follow-up visits as told by your health care provider. This is important.  Contact a health care provider if:  You have:  A fever.  More redness, swelling, or pain around your incisions.  Fluid or blood coming from your incisions.  Pus or a bad smell coming from your incisions.  Your incisions feel warm to the touch.  Get help right away if:  You have:  Chest pain.  Difficulty breathing.  A severe increase in pain or swelling.  Pain, tenderness, or redness in your calf.  Your toes tingle or become blue, numb, or very cold.  Summary  After your procedure, it is common to have pain and swelling in your ankle.  Follow your health care provider's instructions if you have a cast, splint, or boot.  Take steps to treat swelling and pain. You may need to put ice on your ankle and elevate it above the level of your heart.  Contact a health care provider if you have a fever or  more redness, swelling, or pain around your incisions.  Get help right away if you have chest pain, difficulty breathing, or severe pain and swelling. These may be signs of a serious problem.  This information is not intended to replace advice given to you by your health care provider. Make sure you discuss any questions you have with your health care provider.  Document Released: 06/07/2011 Document Revised: 05/20/2019 Document Reviewed: 05/20/2019  [employer] Patient Education © 2020 [employer]

## 2023-06-22 NOTE — ANESTHESIA PROCEDURE NOTES
Airway    Date/Time: 6/22/2023 7:39 AM    Performed by: Harper Simons M.D.  Authorized by: Harper Simons M.D.    Location:  OR  Urgency:  Elective  Difficult Airway: No    Indications for Airway Management:  Anesthesia      Spontaneous Ventilation: absent    Sedation Level:  Deep  Preoxygenated: Yes    Patient Position:  Sniffing  Mask Difficulty Assessment:  1 - vent by mask  Final Airway Type:  Endotracheal airway  Final Endotracheal Airway:  ETT  Cuffed: Yes    Technique Used for Successful ETT Placement:  Direct laryngoscopy    Insertion Site:  Oral  Blade Type:  Lee  Laryngoscope Blade/Videolaryngoscope Blade Size:  3  ETT Size (mm):  7.0  Measured from:  Teeth  ETT to Teeth (cm):  21  Placement Verified by: auscultation and capnometry    Cormack-Lehane Classification:  Grade IIa - partial view of glottis  Number of Attempts at Approach:  1

## 2023-06-22 NOTE — ANESTHESIA PROCEDURE NOTES
Peripheral Block    Date/Time: 6/22/2023 7:08 AM    Performed by: Harper Simons M.D.  Authorized by: Harper Simons M.D.    Patient Location:  Pre-op  Start Time:  6/22/2023 7:08 AM  End Time:  6/22/2023 7:23 AM  Reason for Block: at surgeon's request and post-op pain management ONLY    patient identified, IV checked, site marked, risks and benefits discussed, surgical consent, monitors and equipment checked, pre-op evaluation and timeout performed    Patient Position:  Prone  Prep: ChloraPrep    Monitoring:  Heart rate, continuous pulse ox and cardiac monitor  Block Region:  Lower Extremity  Lower Extremity - Block Type:  Selective FEMORAL nerve block at the Adductor Canal and Sciatic, pos/sub - SCIATIC nerve block, posterior or sub-gluteal approach    Laterality:  Left  Procedures: ultrasound guided  Image captured, interpreted and electronically stored.  Local Infiltration:  Lidocaine  Strength:  1 %  Dose:  3 ml  Block Type:  Single-shot  Needle Length:  100mm  Needle Gauge:  21 G  Needle Localization:  Ultrasound guidance  Injection Assessment:  Negative aspiration for heme, no paresthesia on injection, incremental injection and local visualized surrounding nerve on ultrasound  Evidence of intravascular injection: No     20 mL   Bup with epi injected around the sciatic nerve and 15 mL Bup with epi injected at the adductor canal.  Good hydrodisection visualized.

## 2023-06-22 NOTE — ANESTHESIA PREPROCEDURE EVALUATION
Case: [id number] Date/Time: 06/22/23 0715    Procedure: LEFT ARTHRODESIS MIDTARASAL OR TARSOMETATARSAL MIDFOOT FUSION SINGLE JOINT WITH NEUROLYSIS OF DEEP PERONEAL NERVE; FUSE LEFT MIDFOOT AND DECOMPRESS NERVE USING HARDWARE AS NECESSARY AND REPAIRS AS INDICATED    Pre-op diagnosis: LOCALIZED PRIMARY OSTEOARTHRITIS OF ANKLE AND/OR FOOT    Location: [facility] OR 04 / SURGERY [facility]    Surgeons: Wm Jose Ro M.D.          Relevant Problems   CARDIAC   (positive) Essential hypertension      ENDO   (positive) Hypothyroidism      Other   (positive) Inflammatory bowel disease   (positive) LVH (left ventricular hypertrophy)   (positive) Metabolic syndrome   (positive) Mixed hyperlipidemia   (positive) Morbid obesity with BMI of 40.0-44.9, adult (HCC)       Physical Exam    Airway   Mallampati: II  TM distance: >3 FB  Neck ROM: full       Cardiovascular - normal exam  Rhythm: regular  Rate: normal  (-) murmur     Dental - normal exam           Pulmonary - normal exam  Breath sounds clear to auscultation     Abdominal    Neurological - normal exam                 Anesthesia Plan    ASA 3   ASA physical status 3 criteria: morbid obesity - BMI greater than or equal to 40    Plan - general       Airway plan will be ETT          Induction: intravenous    Postoperative Plan: Postoperative administration of opioids is intended.    Pertinent diagnostic labs and testing reviewed    Informed Consent:    Anesthetic plan and risks discussed with patient.    Use of blood products discussed with: patient whom consented to blood products.

## 2023-06-22 NOTE — OR NURSING
0834: Patient arrived from OR via gurney.  Surgical dressing on left foot CDI boot in place. Cold pack in place, no complaints of pain or nausea at this time, + block.     Stop Bang per protocol for a history of sleep apnea.    Sedation/Resp Status: Non responsive to verbal, no eye opening to verbal.  Respirations spontaneous and non-labored.    HR 89SR; VSS on 10L 02 via simple mask.     0849: Surgical dressing CDI, no complaints of nausea at this time, vital signs are stable. Complains of 6 out of 10 pain medicated per MAR order with Oxycodone 5mg and Fentanyl 25 mcg.     0852: Patient placed on home CPAP.     0857: Given Fentanyl 25mcg per MAR order.    0904: Surgical dressing CDI, no complaints of nausea at this time, vital signs are stable. Complains of 6 out of 10 pain will medicate per MAR order.    0912: Family called with updates.     0915: Given Fentanyl 25mcg per MAR order.    0934: Surgical dressing CDI, no complaints of nausea at this time, vital signs are stable. Complains of 4 out of 10 tolerable pain. Stop bang protocol complete.     0945: Patient meets criteria for transfer to stage II.     0950: Report to Melissa MARTINO.     0951: Patient transferred to stage II by CNA.

## 2023-06-22 NOTE — OP REPORT
DATE OF OPERATION: 6/22/2023     SURGEON: Wm Jose Ro M.D.    ANESTHESIOLOGIST: Anesthesiologist: Harper Simons M.D.    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  She had less neurologic symptoms along the DPN as compared to prior visits but they were still present.  We discussed the planned procedure including fusion, use of hardware and specifically discussed possibility of diminished sensation on a long-term basis postop.  Consent was obtained.    Once in the operating suite general anesthesia was initiated and the patient had received a regional block from anesthesia placed in preop holding.  Patient was carefully positioned on the beanbag with the foot supine with a thigh tourniquet in place.  Prep and drape followed and then a timeout per facility protocol.  I exsanguinated the foot with an Esmarch wrap 3 turns at the ankle as the operative tourniquet and fluoroscopy was used to Skin Skribe the operative site prior to incision.  A curvilinear incision dorsally centered at the second TMT joint was initiated and extended proximally distally only as indicated.  I protected the superficial peroneal nerve and then identified the deep peroneal nerve and swept this medially.  I then placed a metallic marker in the second TMT joint and confirmed position under fluoroscopy and then proceeded with dissection noting near absence of remaining articular cartilage.  There was dorsal bossing at this level and this was all taken down.  I prepared the joint for fusion with a small  resurfacing tool and then performed fenestrations with a 2 mm drill on both sides of the joint.  The fusion included the articulation to the adjacent metatarsal bases.  A slurry of bone graft was developed and allowed to remain.  The joint was then reduced and 2 [employer] medical staples at divergent angles were used to compress the joint.  Once completed there was no motion at the joint and confirmation of appropriate position was confirmed by fluoroscopy.  I requested this fluoroscopy saved the photographs in the image bank.    There was dorsal bossing at the first TMT joint which was taken down and then a neurolysis of the DPN through the region to assure no further entrapment.  The wound was irrigated and closed with a few interrupted deep Monocryl followed by subcuticular running Monocryl and skin nylon closure.  A dressing was applied followed by application boot.    Utilization of surgical first assist JOSE ANTONIO Eric was necessary for patient positioning as well as retraction and assistance with hardware placement.  She assisted in the final layer skin closure as well as application of the dressing and boot and was present the entire procedure.      ____________________________________    Jose Ro M.D.

## 2023-06-22 NOTE — ANESTHESIA TIME REPORT
Anesthesia Start and Stop Event Times     Date Time Event    6/22/2023 0650 Ready for Procedure     0733 Anesthesia Start     0835 Anesthesia Stop        Responsible Staff  06/22/23    Name Role Begin End    Harper Simons M.D. Anesth 0733 0835        Overtime Reason:  no overtime (within assigned shift)    Comments:

## 2023-07-18 ENCOUNTER — PATIENT MESSAGE (OUTPATIENT)
Dept: CARDIOLOGY | Facility: MEDICAL CENTER | Age: 63
End: 2023-07-18
Payer: COMMERCIAL

## 2023-07-18 DIAGNOSIS — I10 ESSENTIAL HYPERTENSION: ICD-10-CM

## 2023-07-19 NOTE — PATIENT COMMUNICATION
To DA: Please see patient MyChart requesting recommendations for new cardiologist, and if she needs to have labs completed. Thank you!

## 2023-07-24 ENCOUNTER — NON-PROVIDER VISIT (OUTPATIENT)
Dept: CARDIOLOGY | Facility: MEDICAL CENTER | Age: 63
End: 2023-07-24
Attending: INTERNAL MEDICINE
Payer: COMMERCIAL

## 2023-07-24 VITALS — WEIGHT: 252 LBS | BODY MASS INDEX: 38.32 KG/M2

## 2023-07-24 DIAGNOSIS — R63.5 WEIGHT GAIN: ICD-10-CM

## 2023-07-24 DIAGNOSIS — E88.810 METABOLIC SYNDROME: ICD-10-CM

## 2023-07-24 DIAGNOSIS — E78.00 HYPERCHOLESTEROLEMIA: ICD-10-CM

## 2023-07-24 DIAGNOSIS — E66.01 MORBID OBESITY WITH BMI OF 40.0-44.9, ADULT (HCC): ICD-10-CM

## 2023-07-24 DIAGNOSIS — R73.03 PREDIABETES: ICD-10-CM

## 2023-07-24 PROCEDURE — 99212 OFFICE O/P EST SF 10 MIN: CPT | Performed by: INTERNAL MEDICINE

## 2023-07-24 RX ORDER — SEMAGLUTIDE 0.68 MG/ML
0.5 INJECTION, SOLUTION SUBCUTANEOUS
Qty: 3 ML | Refills: 5 | Status: SHIPPED | OUTPATIENT
Start: 2023-07-24 | End: 2023-08-25 | Stop reason: SDUPTHER

## 2023-07-24 ASSESSMENT — FIBROSIS 4 INDEX: FIB4 SCORE: 0.86

## 2023-07-24 NOTE — PROGRESS NOTES
Patient Consult Note    Primary care physician: Tee Cedeño M.D.    Reason for consult: Obesity/Weight Management    HPI:  Nazia Lyle is a 63 y.o. old patient who comes in today for evaluation of above stated problem.    Initial Weight: 279 lbs    Initial BMI: 42.42 kg/m2    Most Recent HbA1c:   Lab Results   Component Value Date/Time    HBA1C 5.6 06/07/2023 10:01 AM      Lab Results   Component Value Date/Time    CREATININE 0.55 06/07/2023 10:01 AM        Obesity Medication History and Current Regimen  GLP-1 Agent: Ozempic 0.25 mg subQ once weekly   -Previously tried phentermine and metformin. Pt states that metformin was only Dc'd d/t being lost to f/u w/ other wt management clinic. Pt w/ hx of IBD - can only tolerate metformin 500 mg 1/2 tab BID.     Current Exercise - Pt was walking, but had surgery recently and has been very immobile since June 2023.   Exercise Goal - Increase as tolerated to goal of 150 min/week.     Dietary: Since last visit, pt has noted more of an appetite lately. Focusing on DASH Diet & more protein. Eating smaller portions more often then.  Breakfast - Protein bar, egg whites  Lunch - Salad, tuna  Dinner - Lean protein and vegetable  Snack - Doesn't typically snack  Dessert - Avoids as much as she can. Will indulge w/ fruit if she craves sweets. Occasional pastries.  Beverage - Water, coffee w/ cream & Stevia     Preventative Management  BP regimen (ACE/ARB) - Valsartan 320 mg once daily  ASA - None  Statin - Atorvastatin 40 mg once daily  Last A1c -   Lab Results   Component Value Date/Time    HBA1C 5.6 06/07/2023 10:01 AM         Past Medical History:  Patient Active Problem List    Diagnosis Date Noted    Prediabetes 10/04/2021    Hypercholesterolemia 03/31/2021    Metabolic syndrome 08/19/2020    Weight gain 04/20/2020    Inflammatory bowel disease 04/20/2020    LVH (left ventricular hypertrophy) 04/20/2020    Family history of coronary artery disease 03/17/2020     Vitamin D deficiency 11/22/2019    Hypothyroidism 08/08/2014    Elevated fasting blood sugar 08/08/2014    Hot flash, menopausal 08/08/2014    Morbid obesity with BMI of 40.0-44.9, adult (HCC) 08/08/2014    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    Essential hypertension 09/17/2012    Mixed hyperlipidemia 09/17/2012       Past Surgical History:  Past Surgical History:   Procedure Laterality Date    FUSION AND ARTHROEREISIS, JOINT, FOOT AND ANKLE Left 6/22/2023    Procedure: LEFT ARTHRODESIS TARSOMETATARSAL MIDFOOT FUSION SINGLE JOINT WITH NEUROLYSIS OF DEEP PERONEAL NERVE;  Surgeon: Wm Jose Ro M.D.;  Location: SURGERY [facility];  Service: Orthopedics    HARDWARE REMOVAL UPPER EXTREMITY Left 08/11/2015    Procedure: HARDWARE REMOVAL UPPER EXTREMITY;  Surgeon: Chaka Mckeon M.D.;  Location: SURGERY [facility];  Service:     HAND SURGERY Right 2014    middle finger    APPENDECTOMY      CHOLECYSTECTOMY      PRIMARY C SECTION      TONSILLECTOMY         Allergies:  Latex and Tape    Social History:  Social History     Socioeconomic History    Marital status:      Spouse name: Not on file    Number of children: Not on file    Years of education: Not on file    Highest education level: Not on file   Occupational History    Not on file   Tobacco Use    Smoking status: Never    Smokeless tobacco: Never   Vaping Use    Vaping Use: Never used   Substance and Sexual Activity    Alcohol use: No    Drug use: No    Sexual activity: Not on file   Other Topics Concern    Not on file   Social History Narrative    Not on file     Social Determinants of Health     Financial Resource Strain: Not on file   Food Insecurity: Not on file   Transportation Needs: Not on file   Physical Activity: Not on file   Stress: Not on file   Social Connections: Not on file   Intimate Partner Violence: Not on file   Housing Stability: Not on file       Family History:  Family History   Problem Relation Age of Onset    Thyroid  Mother     Diabetes Father     Hypertension Father     Diabetes Maternal Grandmother     Diabetes Maternal Grandfather     Heart Disease Paternal Grandmother     Cancer Paternal Grandfather        Medications:    Current Outpatient Medications:     chlorthalidone (HYGROTON) 25 MG Tab, take 1 tablet by mouth once daily (Patient taking differently: every evening.), Disp: 90 Tablet, Rfl: 0    oxyCODONE immediate-release (ROXICODONE) 5 MG Tab, Take 5 mg by mouth every 4 hours., Disp: , Rfl:     traMADol (ULTRAM) 50 MG Tab, Take 50 mg by mouth every 6 hours as needed., Disp: , Rfl:     Probiotic Product (PROBIOTIC PO), Take  by mouth every day., Disp: , Rfl:     Zinc 22.5 MG Tab, Take  by mouth every day., Disp: , Rfl:     potassium chloride SA (KDUR) 20 MEQ Tab CR, Take 2 Tablets by mouth 2 times a day., Disp: 360 Tablet, Rfl: 2    Semaglutide,0.25 or 0.5MG/DOS, (OZEMPIC, 0.25 OR 0.5 MG/DOSE,) 2 MG/3ML Solution Pen-injector, Inject 0.25 mg under the skin every 7 days., Disp: 3 mL, Rfl: 5    SYNTHROID 125 MCG Tab, Take 1 Tablet by mouth every morning on an empty stomach., Disp: 90 Tablet, Rfl: 4    omega-3 acid ethyl esters (LOVAZA) 1 GM capsule, Take 2 Capsules by mouth 2 times a day., Disp: 360 Capsule, Rfl: 3    atorvastatin (LIPITOR) 40 MG Tab, Take 1 Tablet by mouth every evening., Disp: 90 Tablet, Rfl: 3    valsartan (DIOVAN) 320 MG tablet, take 1 tablet by mouth once daily (Patient taking differently: every evening.), Disp: 90 Tablet, Rfl: 3    amLODIPine (NORVASC) 5 MG Tab, take 1 tablet by mouth once daily (Patient taking differently: every evening.), Disp: 90 Tablet, Rfl: 3    Ascorbic Acid (VITAMIN C CR) 1500 MG Tab CR, every day., Disp: , Rfl:     Colostrum 500 MG Cap, every day., Disp: , Rfl:     Labs: Reviewed    Physical Examination:  Vital signs: There were no vitals taken for this visit. There is no height or weight on file to calculate BMI.    Assessment and Plan:    1. Obesity/Weight Management  Pt  presents to clinic doing well overall. She has been working to eat more Mediterranean style, but has not been as active d/t recent surgery.  Since our last visit, pt has lost ~ 12 lbs. This amounts to ~ 27 lbs total since establishing w/ our clinic.  Pt states she has noticed somewhat of an increase in her appetite since our last visit.  No issues w/ SE or cost of Ozempic. It is ~ $25/month.  Pt interested in intensification of GLP1 therapy at this time.    - Medication changes:  INCREASE Ozempic up to 0.5 mg subQ once weekly     - Lifestyle changes:  Diet: Maximize lean proteins and veggies. Cut out/down on carbs. Avoid simple sugars.   Exercise: Increase as tolerated    FU: 4 weeks via telephone & 3 months in clinic    Michael Strong, DaylinD, BCACP  07/24/23    CC:   Tee Cedeño M.D.

## 2023-08-01 ENCOUNTER — OFFICE VISIT (OUTPATIENT)
Dept: ENDOCRINOLOGY | Facility: MEDICAL CENTER | Age: 63
End: 2023-08-01
Payer: COMMERCIAL

## 2023-08-01 VITALS
BODY MASS INDEX: 37.53 KG/M2 | HEIGHT: 68 IN | WEIGHT: 247.6 LBS | DIASTOLIC BLOOD PRESSURE: 62 MMHG | OXYGEN SATURATION: 95 % | HEART RATE: 119 BPM | SYSTOLIC BLOOD PRESSURE: 110 MMHG

## 2023-08-01 DIAGNOSIS — R73.03 PREDIABETES: ICD-10-CM

## 2023-08-01 DIAGNOSIS — E06.3 HASHIMOTO'S DISEASE: ICD-10-CM

## 2023-08-01 DIAGNOSIS — E03.9 HYPOTHYROIDISM, ACQUIRED: ICD-10-CM

## 2023-08-01 DIAGNOSIS — E55.9 VITAMIN D DEFICIENCY: ICD-10-CM

## 2023-08-01 DIAGNOSIS — E78.2 MIXED HYPERLIPIDEMIA: ICD-10-CM

## 2023-08-01 PROCEDURE — 3078F DIAST BP <80 MM HG: CPT

## 2023-08-01 PROCEDURE — 99214 OFFICE O/P EST MOD 30 MIN: CPT

## 2023-08-01 PROCEDURE — 99211 OFF/OP EST MAY X REQ PHY/QHP: CPT

## 2023-08-01 PROCEDURE — 3074F SYST BP LT 130 MM HG: CPT

## 2023-08-01 RX ORDER — LEVOTHYROXINE SODIUM 112 UG/1
112 TABLET ORAL
Qty: 90 TABLET | Refills: 4 | Status: SHIPPED | OUTPATIENT
Start: 2023-08-01

## 2023-08-01 RX ORDER — LEVOTHYROXINE SODIUM 137 UG/1
137 TABLET ORAL
Qty: 90 TABLET | Refills: 4 | Status: SHIPPED
Start: 2023-08-01 | End: 2023-08-01

## 2023-08-01 ASSESSMENT — FIBROSIS 4 INDEX: FIB4 SCORE: 0.86

## 2023-08-01 NOTE — PROGRESS NOTES
Chief Complaint: Consult requested by Tee Cedeño M.D. for evaluation of the following conditions  HPI:   Nazia Lyle is a 63 y.o. female     Hypothyroidism, acquired:  Diagnosed many years ago.     She is currently on Synthroid 125 mcg daily    She denies Good compliance and takes her thyroid hormone daily before breakfast.    She denies taking any iron, calcium supplements or antacids.  Denies multivitamins  Denies taking biotin     She reports:fatigue-fluctuates, recent surgery, mild palpitation   She denies weight gain, palpitations, feeling cold and cold intolerance, constipation, swelling, feeling slow, losing hair, anxiousness, feeling excessive energy, tremulousness, sweating, and weight loss.      She reports a family history of Thyroid disease.     Blood work done at [facility] 7/28/2023  -TSH at 0.10 (0.47-4.90)  -Free T4 1.45 (0.47-4.90)       2.  Hashimoto's disease:  Presumable etiology of her hypothyroidism        3.  Prediabetes:  FV by pcp   POC A1c on 4/18/2023 at 5.8%  Ozempic 0.5 mg weekly     4.  Vitamin D deficiency:  Currently not taking any supplementation -    Blood work done at [facility] 7/28/2023  Vit D3 total at 43    5. Mixed hyperlipidemia:  Patient requesting courtesy prescription for her omega-3 before she sees her PCP    Patient's medications, allergies, and social histories were reviewed and updated as appropriate.      ROS:     CONS:     No fever, no chills, no weight loss, no fatigue   EYES:      No diplopia, no blurry vision, no redness of eyes, no swelling of eyelids   ENT:    No hearing loss, No ear pain, No sore throat, no dysphagia, no neck swelling   CV:     No chest pain, no palpitations, no claudication, no orthopnea, no PND   PULM:    No SOB, no cough, no hemoptysis, no wheezing    GI:   No nausea, no vomiting, no diarrhea, no constipation, no bloody stools   :  Passing urine well, no dysuria, no hematuria   ENDO:   No polyuria, no polydipsia, no heat  intolerance, no cold intolerance   NEURO: No headaches, no dizziness, no convulsions, no tremors   MUSC:  No joint swellings, no arthralgias, no myalgias, no weakness   SKIN:   No rash, no ulcers, no dry skin   PSYCH:   No depression, no anxiety, no difficulty sleeping       Past Medical History:  Patient Active Problem List    Diagnosis Date Noted    Prediabetes 10/04/2021    Hypercholesterolemia 03/31/2021    Metabolic syndrome 08/19/2020    Weight gain 04/20/2020    Inflammatory bowel disease 04/20/2020    LVH (left ventricular hypertrophy) 04/20/2020    Family history of coronary artery disease 03/17/2020    Vitamin D deficiency 11/22/2019    Hypothyroidism 08/08/2014    Elevated fasting blood sugar 08/08/2014    Hot flash, menopausal 08/08/2014    Morbid obesity with BMI of 40.0-44.9, adult (HCC) 08/08/2014    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    Essential hypertension 09/17/2012    Mixed hyperlipidemia 09/17/2012       Past Surgical History:  Past Surgical History:   Procedure Laterality Date    FUSION AND ARTHROEREISIS, JOINT, FOOT AND ANKLE Left 6/22/2023    Procedure: LEFT ARTHRODESIS TARSOMETATARSAL MIDFOOT FUSION SINGLE JOINT WITH NEUROLYSIS OF DEEP PERONEAL NERVE;  Surgeon: Wm Jose Ro M.D.;  Location: SURGERY [facility];  Service: Orthopedics    HARDWARE REMOVAL UPPER EXTREMITY Left 08/11/2015    Procedure: HARDWARE REMOVAL UPPER EXTREMITY;  Surgeon: Chaka Mckeon M.D.;  Location: SURGERY [facility];  Service:     HAND SURGERY Right 2014    middle finger    APPENDECTOMY      CHOLECYSTECTOMY      PRIMARY C SECTION      TONSILLECTOMY          Allergies:  Latex and Nkda [no known drug allergy]     Current Medications:    Current Outpatient Medications:     chlorthalidone (HYGROTON) 25 MG Tab, take 1 tablet by mouth once daily (Patient taking differently: every evening.), Disp: 90 Tablet, Rfl: 0    Semaglutide,0.25 or 0.5MG/DOS, (OZEMPIC, 0.25 OR 0.5 MG/DOSE,) 2 MG/3ML Solution  Pen-injector, Inject 0.5 mg under the skin every 7 days., Disp: 3 mL, Rfl: 5    oxyCODONE immediate-release (ROXICODONE) 5 MG Tab, Take 5 mg by mouth every 4 hours., Disp: , Rfl:     traMADol (ULTRAM) 50 MG Tab, Take 50 mg by mouth every 6 hours as needed., Disp: , Rfl:     Probiotic Product (PROBIOTIC PO), Take  by mouth every day., Disp: , Rfl:     Zinc 22.5 MG Tab, Take  by mouth every day., Disp: , Rfl:     potassium chloride SA (KDUR) 20 MEQ Tab CR, Take 2 Tablets by mouth 2 times a day., Disp: 360 Tablet, Rfl: 2    SYNTHROID 125 MCG Tab, Take 1 Tablet by mouth every morning on an empty stomach., Disp: 90 Tablet, Rfl: 4    omega-3 acid ethyl esters (LOVAZA) 1 GM capsule, Take 2 Capsules by mouth 2 times a day., Disp: 360 Capsule, Rfl: 3    atorvastatin (LIPITOR) 40 MG Tab, Take 1 Tablet by mouth every evening., Disp: 90 Tablet, Rfl: 3    valsartan (DIOVAN) 320 MG tablet, take 1 tablet by mouth once daily (Patient taking differently: every evening.), Disp: 90 Tablet, Rfl: 3    amLODIPine (NORVASC) 5 MG Tab, take 1 tablet by mouth once daily (Patient taking differently: every evening.), Disp: 90 Tablet, Rfl: 3    Ascorbic Acid (VITAMIN C CR) 1500 MG Tab CR, every day., Disp: , Rfl:     Colostrum 500 MG Cap, every day., Disp: , Rfl:     Social History:  Social History     Socioeconomic History    Marital status:      Spouse name: Not on file    Number of children: Not on file    Years of education: Not on file    Highest education level: Not on file   Occupational History    Not on file   Tobacco Use    Smoking status: Never    Smokeless tobacco: Never   Vaping Use    Vaping Use: Never used   Substance and Sexual Activity    Alcohol use: No    Drug use: No    Sexual activity: Not on file   Other Topics Concern    Not on file   Social History Narrative    Not on file     Social Determinants of Health     Financial Resource Strain: Not on file   Food Insecurity: Not on file   Transportation Needs: Not on file  "  Physical Activity: Not on file   Stress: Not on file   Social Connections: Not on file   Intimate Partner Violence: Not on file   Housing Stability: Not on file        Family History:   Family History   Problem Relation Age of Onset    Thyroid Mother     Diabetes Father     Hypertension Father     Diabetes Maternal Grandmother     Diabetes Maternal Grandfather     Heart Disease Paternal Grandmother     Cancer Paternal Grandfather          PHYSICAL EXAM:   Vital signs: /62 (BP Location: Left arm, Patient Position: Sitting, BP Cuff Size: Large adult)   Pulse (!) 119   Ht 1.727 m (5' 8\")   Wt 112 kg (247 lb 9.6 oz)   SpO2 95%   BMI 37.65 kg/m²   GENERAL: Well-developed, well-nourished  in no apparent distress.    SKIN: No rashes, lesions. Turgor is normal.    Labs:      Imaging:      ASSESSMENT/PLAN:   1. Hypothyroidism, acquired  Clinically unstable with reports of palpitations  Biochemecally mildly suppressed TSH  On last appointment a prescription was sent to [facility] with a dose of 125 mcg daily on an empty stomach  We will decrease her medication by taking Synthroid 112 mcg daily on an empty stomach-sent to [facility]  - TSH; Future  - FREE THYROXINE; Future  - Comp Metabolic Panel; Future    2. Hashimoto's disease  This is etiology of her hypothyroidism    3. Prediabetes  Stable  Follow up PCP    4. Vitamin D deficiency  Stable  Continue regimen  - VITAMIN D,25 HYDROXY (DEFICIENCY); Future    5. Mixed hyperlipidemia  Stable  Follow up PCP    Disposition: Make an appointment to follow-up in 3 months  Do your blood work 2 weeks prior to next appointment    Thank you kindly for allowing me to participate in the thyroid care plan for this patient.    CHIKA [email or web address].  04/20/23    CC:   Tee Cedeño M.D.  "

## 2023-08-10 ENCOUNTER — OFFICE VISIT (OUTPATIENT)
Dept: CARDIOLOGY | Facility: MEDICAL CENTER | Age: 63
End: 2023-08-10
Attending: INTERNAL MEDICINE
Payer: COMMERCIAL

## 2023-08-10 VITALS
HEART RATE: 87 BPM | WEIGHT: 246 LBS | SYSTOLIC BLOOD PRESSURE: 110 MMHG | OXYGEN SATURATION: 94 % | BODY MASS INDEX: 37.28 KG/M2 | DIASTOLIC BLOOD PRESSURE: 68 MMHG | HEIGHT: 68 IN | RESPIRATION RATE: 16 BRPM

## 2023-08-10 DIAGNOSIS — E78.2 MIXED HYPERLIPIDEMIA: ICD-10-CM

## 2023-08-10 DIAGNOSIS — I10 ESSENTIAL HYPERTENSION: ICD-10-CM

## 2023-08-10 PROCEDURE — 99213 OFFICE O/P EST LOW 20 MIN: CPT | Performed by: INTERNAL MEDICINE

## 2023-08-10 PROCEDURE — 3078F DIAST BP <80 MM HG: CPT | Performed by: INTERNAL MEDICINE

## 2023-08-10 PROCEDURE — 3074F SYST BP LT 130 MM HG: CPT | Performed by: INTERNAL MEDICINE

## 2023-08-10 PROCEDURE — 99214 OFFICE O/P EST MOD 30 MIN: CPT | Performed by: INTERNAL MEDICINE

## 2023-08-10 ASSESSMENT — FIBROSIS 4 INDEX: FIB4 SCORE: 0.86

## 2023-08-10 NOTE — PROGRESS NOTES
Cardiology Telemedicine Visit Follow-up Consultation Note    Date of note:     8/10/2023  Primary Care Provider: Tee Cedeño M.D.    Name:             Nazia Lyle   YOB: 1960  MRN:               [id number]      Chief Complaint   Patient presents with    Hypertension       HISTORY OF PRESENT ILLNESS  Ms. Nazia Lyle is a 60 y.o. female who returns to see us for follow-up     Pertinent History:  Essential HTN   Dyslipidemia  TELLES  Obesity with BMI 38.3     Last clinic visit: 4/13/2023    Interim history:  Since her last visit, has been doing well from a cardiac perspective.  Blood pressure well-controlled.  Has successfully lost weight on Ozempic which she is happy about.  Denies any lightheadedness, dizziness, chest pain or pressure.    During visit 1/12/2023:  Had a rough 2022.  Mother had a stroke end of May 2022 with significant residual deficits.  Unfortunately, she lost her only brother from COVID infection who lives in [place].  Mother recently passed on 12/21/2022 from complications of stroke.     From a cardiac perspective, denies any concerning symptoms of chest pain, pressure, palpitations or elevated blood pressure with recent stressful events.  Is taking medication on a regular basis without any side effects.    For weight loss, is working with a  with focus on dietary and lifestyle modifications.      Past Medical History:   Diagnosis Date    Anesthesia     PONV    Arthritis     osteoarthritis, hands    Bowel habit changes     IBD,, occasional diarrhea    Chickenpox     Dyslipidemia 09/17/2012    Fatty liver     [place] measles     as a child    Heart murmur     High cholesterol     History of kidney stones     HTN (hypertension) 09/17/2012    Hypothyroid     IBD (inflammatory bowel disease)     Obesity 09/17/2012    PONV (postoperative nausea and vomiting)     Pre-diabetes     Sleep apnea     CPAP         Past Surgical History:   Procedure Laterality Date     FUSION AND ARTHROEREISIS, JOINT, FOOT AND ANKLE Left 6/22/2023    Procedure: LEFT ARTHRODESIS TARSOMETATARSAL MIDFOOT FUSION SINGLE JOINT WITH NEUROLYSIS OF DEEP PERONEAL NERVE;  Surgeon: Wm Jose Ro M.D.;  Location: SURGERY [facility];  Service: Orthopedics    HARDWARE REMOVAL UPPER EXTREMITY Left 08/11/2015    Procedure: HARDWARE REMOVAL UPPER EXTREMITY;  Surgeon: Chaka Mckeon M.D.;  Location: SURGERY [facility];  Service:     HAND SURGERY Right 2014    middle finger    APPENDECTOMY      CHOLECYSTECTOMY      PRIMARY C SECTION      TONSILLECTOMY           Current Outpatient Medications   Medication Sig Dispense Refill    levothyroxine (SYNTHROID) 112 MCG Tab Take 1 Tablet by mouth every morning on an empty stomach. 90 Tablet 4    Semaglutide,0.25 or 0.5MG/DOS, (OZEMPIC, 0.25 OR 0.5 MG/DOSE,) 2 MG/3ML Solution Pen-injector Inject 0.5 mg under the skin every 7 days. 3 mL 5    Probiotic Product (PROBIOTIC PO) Take  by mouth every day.      Zinc 22.5 MG Tab Take  by mouth every day.      chlorthalidone (HYGROTON) 25 MG Tab take 1 tablet by mouth once daily (Patient taking differently: every evening.) 90 Tablet 0    potassium chloride SA (KDUR) 20 MEQ Tab CR Take 2 Tablets by mouth 2 times a day. 360 Tablet 2    omega-3 acid ethyl esters (LOVAZA) 1 GM capsule Take 2 Capsules by mouth 2 times a day. 360 Capsule 3    atorvastatin (LIPITOR) 40 MG Tab Take 1 Tablet by mouth every evening. 90 Tablet 3    valsartan (DIOVAN) 320 MG tablet take 1 tablet by mouth once daily (Patient taking differently: every evening.) 90 Tablet 3    amLODIPine (NORVASC) 5 MG Tab take 1 tablet by mouth once daily (Patient taking differently: every evening.) 90 Tablet 3    Ascorbic Acid (VITAMIN C CR) 1500 MG Tab CR every day.      Colostrum 500 MG Cap every day.      traMADol (ULTRAM) 50 MG Tab Take 50 mg by mouth every 6 hours as needed.       No current facility-administered medications for this visit.         Allergies  "  Allergen Reactions    Latex Rash     rash    Tape Rash     .         Family History   Problem Relation Age of Onset    Thyroid Mother     Diabetes Father     Hypertension Father     Diabetes Maternal Grandmother     Diabetes Maternal Grandfather     Heart Disease Paternal Grandmother     Cancer Paternal Grandfather          Social History     Socioeconomic History    Marital status:      Spouse name: Not on file    Number of children: Not on file    Years of education: Not on file    Highest education level: Not on file   Occupational History    Not on file   Tobacco Use    Smoking status: Never    Smokeless tobacco: Never   Vaping Use    Vaping Use: Never used   Substance and Sexual Activity    Alcohol use: No    Drug use: No    Sexual activity: Not on file   Other Topics Concern    Not on file   Social History Narrative    Not on file     Social Determinants of Health     Financial Resource Strain: Not on file   Food Insecurity: Not on file   Transportation Needs: Not on file   Physical Activity: Not on file   Stress: Not on file   Social Connections: Not on file   Intimate Partner Violence: Not on file   Housing Stability: Not on file         Physical Exam:  Vitals as provided by the patient  /68 (BP Location: Left arm, Patient Position: Sitting, BP Cuff Size: Adult)   Pulse 87   Resp 16   Ht 1.727 m (5' 8\")   Wt 112 kg (246 lb)   SpO2 94%    Oxygen Therapy:  Pulse Oximetry: 94 %  BP Readings from Last 4 Encounters:   08/10/23 110/68   08/01/23 110/62   06/22/23 (!) 147/79   04/20/23 118/70       Weight/BMI: Body mass index is 37.4 kg/m².  Wt Readings from Last 4 Encounters:   08/10/23 112 kg (246 lb)   08/01/23 112 kg (247 lb 9.6 oz)   07/24/23 114 kg (252 lb)   06/22/23 115 kg (254 lb 3.1 oz)       GEN: Well developed, well nourished and in no acute distress.  Neck: Trachea midline, supple neck  Resp: CTAB, no wheezing/Rales/crackles, satting well on room air  CVS: RRR, no murmur/rub/gallop, " no RLE edema, no carotid bruit  MSK/Ext: No clubbing or cyanosis visible appreciated.  LLE in orthopedic boot  Skin: No rashes in visible areas.  Psych: A&O x 3, appropriate affect, good judgement, well groomed      Outside lab data review 8/9/2023:  K 4.1, normal Cr    Outside lab data review 4/7/2023:  Sodium 139, potassium 2.9, BUN 15, creatinine 0.6  , HDL 26, LDL 97,     Outside lab Data Review 2/13/2023:  WBC 6.6, hemoglobin 14.5, platelets 356  A1c 6.1  Sodium 138, potassium 3.3, BUN 18, creatinine 0.5    Sodium 139, potassium 3.8, bicarb 30, BUN 16, creatinine 1.8  Total cholesterol 146, HDL 24, LDL 88, triglycerides 167      Cardiac Imaging and Procedures Review:    Overnight pulse oximetry test:  Lowest oxygen saturation 66%, total time with oxygen saturation less than 88% is 39 minutes      Assessment & Plan     1. Essential hypertension        2. Mixed hyperlipidemia            Patient doing excellent from a cardiac perspective.  Blood pressure at goal.  We discussed monitoring signs and symptoms of hypotension.  Continue current antihypertensive therapy without any changes.  We discussed that with further weight loss we may lower medications for BP.  Would recommend discontinuing amlodipine if she is experiencing symptomatic hypotension.    For dyslipidemia, continue Lipitor 40 mg daily.      For obesity and prediabetes, continue Ozempic.    All of patient's excellent questions were answered to the best of my knowledge and to her satisfaction.  It was a pleasure seeing Ms. Nazia Lyle in my clinic today. Return in about 1 year (around 8/10/2024). Patient is aware to call the cardiology clinic with any questions or concerns.      Leon Herman MD  [facility]  [facility], [address]  [address] [address]  Phone: [phone]  Fax: [phone]

## 2023-08-14 DIAGNOSIS — I16.0 HYPERTENSIVE URGENCY: ICD-10-CM

## 2023-08-16 RX ORDER — CHLORTHALIDONE 25 MG/1
25 TABLET ORAL EVERY EVENING
Qty: 90 TABLET | Refills: 3 | Status: SHIPPED | OUTPATIENT
Start: 2023-08-16 | End: 2023-08-28 | Stop reason: SDUPTHER

## 2023-08-18 ENCOUNTER — TELEPHONE (OUTPATIENT)
Dept: CARDIOLOGY | Facility: MEDICAL CENTER | Age: 63
End: 2023-08-18
Payer: COMMERCIAL

## 2023-08-18 NOTE — TELEPHONE ENCOUNTER
Called pt [phone]   Relayed DINO OV note recommendations.  Recommended adequate hydration of 8-8 oz WATER (64 fluid oz) daily to help support blood pressure volume. Pt in agreement. Pt agrees to call next week with update home readings for follow up. Pt verbalized understanding and is appreciative of call back.       MAR updated       -----------------------------------------------------------------------------------------------------------  DION OV note dated 8/10/23:  Patient doing excellent from a cardiac perspective.  Blood pressure at goal.  We discussed monitoring signs and symptoms of hypotension.  Continue current antihypertensive therapy without any changes.  We discussed that with further weight loss we may lower medications for BP. Would recommend discontinuing amlodipine if she is experiencing symptomatic hypotension.

## 2023-08-18 NOTE — TELEPHONE ENCOUNTER
"DA  Caller: Nazia Lyle     Blood pressure/HR reading last 2-3 days:     Pulse average 65-80    8/16/23    Night : BP- 95/63   30 min later   BP-88/61  Hour later   /79     8/18/23  Between 2:30 - 3:30 pm   BP- 76/87  BP-75/95  BP- 80/55  BP- 100/61    Symptoms: Lightheaded, \" warn out\"    Callback Number: [phone]     Thank you  Mandy COSTA"

## 2023-08-21 NOTE — PROGRESS NOTES
Called pt to f/u on GLP1 use - she states she tolerated her dose increase well w/ no issues and is w/ noted appetite suppression. Given this we will continue current therapy w/ no changes.    FU in clinic on 10/2023.    Michael Strong, DaylinD, BCACP

## 2023-08-23 ENCOUNTER — TELEPHONE (OUTPATIENT)
Dept: CARDIOLOGY | Facility: MEDICAL CENTER | Age: 63
End: 2023-08-23
Payer: COMMERCIAL

## 2023-08-23 NOTE — TELEPHONE ENCOUNTER
DINO    Caller: Nazia Lyle    Topic/issue: The pharmacist had increased the patient's Ozempic medication and insurance wants an explanation on why before they refill it again. Insurance pharmacy number is: [phone]    Callback Number: [phone]    Thank you,  -Yen BOWER

## 2023-08-24 NOTE — TELEPHONE ENCOUNTER
DINO    Caller: Nazia Chrissy Lyle     Blood pressure/HR reading last 2-3 days:    08/ 19 BP 5:45 am 120/71 pulse 67  08/19/ BP  9:15 pm  98/63 pulse 66    08/20 BP: 4:20 /72 Pulse 71  08/20 BP 10:43 pm 124/75 pulse 74    08/24 bp: 9:50 am  bp 96/56 pulse 67  08:24 bp  12:00 pm bp 100/47 pulse 78  08/24 bp  2:00 pm   bp 102/60 pulse 65  08/24 bp  4:30 pm  bp106/61 pulse 99    Symptoms: Pt states she is feeling lightheaded. She does not have any energy and feels like she is just breathing shallow. She notices the lightheadedness more when she bends over and stands up.    Pt states her medication chlorthalidone has still not been filled with [facility]    Callback Number: [phone] (home) [phone] (work)     Thank You    Evita ABREU

## 2023-08-25 DIAGNOSIS — R63.5 WEIGHT GAIN: ICD-10-CM

## 2023-08-25 DIAGNOSIS — E78.00 HYPERCHOLESTEROLEMIA: ICD-10-CM

## 2023-08-25 DIAGNOSIS — E66.01 MORBID OBESITY WITH BMI OF 40.0-44.9, ADULT (HCC): ICD-10-CM

## 2023-08-25 DIAGNOSIS — E88.810 METABOLIC SYNDROME: ICD-10-CM

## 2023-08-25 DIAGNOSIS — R73.03 PREDIABETES: ICD-10-CM

## 2023-08-25 RX ORDER — SEMAGLUTIDE 0.68 MG/ML
0.5 INJECTION, SOLUTION SUBCUTANEOUS
Qty: 3 ML | Refills: 5 | Status: SHIPPED | OUTPATIENT
Start: 2023-08-25 | End: 2023-10-23 | Stop reason: SDUPTHER

## 2023-08-25 NOTE — TELEPHONE ENCOUNTER
Called pt's ins - they will no longer cover Ozempic d/t pt lacking dx of T2DM.    Called and notified pt of the above. Her A1c is WNL.    Advised pt to reach out to her ins to discuss other formulary options for weight management (if any).    Pt interested in 340b pricing of Ozempic if unable to find an alternative. Would be ~ $400/month.    Pt to c/b w/ how she'd like to proceed after s/w ins.    Michael Strong, DaylinD, BCACP

## 2023-08-25 NOTE — TELEPHONE ENCOUNTER
S/w DA in clinic.  Home readings reviewed. Okay to stop Amlodipine. Continue to monitor home readings. If symptomatic hypotension continues, can cut chlorthalidone in half or 12.5 mg Q evening.

## 2023-08-25 NOTE — TELEPHONE ENCOUNTER
Called pt [phone]   Relayed DA recommendations. Pt in agreement. Advised MAR will be updated. Encouraged pt to send mychart or call clinic for any questions/concerns. Pt verbalized understanding and is appreciative of call back.       MAR updated

## 2023-08-28 ENCOUNTER — TELEPHONE (OUTPATIENT)
Dept: CARDIOLOGY | Facility: MEDICAL CENTER | Age: 63
End: 2023-08-28
Payer: COMMERCIAL

## 2023-08-28 DIAGNOSIS — I16.0 HYPERTENSIVE URGENCY: ICD-10-CM

## 2023-08-28 RX ORDER — CHLORTHALIDONE 25 MG/1
25 TABLET ORAL EVERY EVENING
Qty: 90 TABLET | Refills: 3 | Status: SHIPPED | OUTPATIENT
Start: 2023-08-28

## 2023-08-28 NOTE — TELEPHONE ENCOUNTER
DINO    Caller:  Nazia    Medication Name and Dosage:    chlorthalidone (HYGROTON) 25 MG Tab [[id number]]    Medication amount left: 0    Preferred Pharmacy:   [facility] - [place]    Other questions (Topic):   [facility] is having issues filling, please send script here.     Callback Number (Will only call for issues): [phone]    Thank you,   Julissa BOLAND

## 2023-08-31 ENCOUNTER — APPOINTMENT (RX ONLY)
Dept: URBAN - METROPOLITAN AREA CLINIC 36 | Facility: CLINIC | Age: 63
Setting detail: DERMATOLOGY
End: 2023-08-31

## 2023-08-31 ENCOUNTER — PHARMACY VISIT (OUTPATIENT)
Dept: PHARMACY | Facility: MEDICAL CENTER | Age: 63
End: 2023-08-31
Payer: COMMERCIAL

## 2023-08-31 DIAGNOSIS — Z41.9 ENCOUNTER FOR PROCEDURE FOR PURPOSES OTHER THAN REMEDYING HEALTH STATE, UNSPECIFIED: ICD-10-CM

## 2023-08-31 PROCEDURE — RXMED WILLOW AMBULATORY MEDICATION CHARGE: Performed by: INTERNAL MEDICINE

## 2023-08-31 PROCEDURE — ? BOTOX

## 2023-08-31 NOTE — PROCEDURE: BOTOX
Expiration Date (Month Year): 01/2026
Left Pupillary Line Units: 0
Show Additional Area 2: Yes
Post-Care Instructions: Patient instructed to not lie down for 4 hours and limit physical activity for 24 hours. Patient instructed not to travel by airplane for 48 hours.
Detail Level: Detailed
Additional Area 3 Location: masseter
Price (Use Numbers Only, No Special Characters Or $): 0.00
Show Lcl Units: No
Lot #: [id number]
Glabellar Complex Units: 20
Consent: Written consent obtained. Risks include but not limited to lid/brow ptosis, bruising, swelling, diplopia, temporary effect, incomplete chemical denervation.
Forehead Units: 10
Dilution (U/0.1 Cc): 0.6
Additional Area 1 Location: upper lip
Depressor Anguli Oris Units: 6
Additional Area 2 Location: chin
Periorbital Skin Units: 14

## 2023-09-29 PROCEDURE — RXMED WILLOW AMBULATORY MEDICATION CHARGE: Performed by: INTERNAL MEDICINE

## 2023-10-03 ENCOUNTER — PHARMACY VISIT (OUTPATIENT)
Dept: PHARMACY | Facility: MEDICAL CENTER | Age: 63
End: 2023-10-03
Payer: COMMERCIAL

## 2023-10-23 ENCOUNTER — NON-PROVIDER VISIT (OUTPATIENT)
Dept: CARDIOLOGY | Facility: MEDICAL CENTER | Age: 63
End: 2023-10-23
Attending: INTERNAL MEDICINE
Payer: COMMERCIAL

## 2023-10-23 VITALS — WEIGHT: 236 LBS | BODY MASS INDEX: 35.88 KG/M2

## 2023-10-23 DIAGNOSIS — R63.5 WEIGHT GAIN: ICD-10-CM

## 2023-10-23 DIAGNOSIS — E88.810 METABOLIC SYNDROME: ICD-10-CM

## 2023-10-23 DIAGNOSIS — R73.03 PREDIABETES: ICD-10-CM

## 2023-10-23 DIAGNOSIS — E78.00 HYPERCHOLESTEROLEMIA: ICD-10-CM

## 2023-10-23 DIAGNOSIS — E66.01 MORBID OBESITY WITH BMI OF 40.0-44.9, ADULT (HCC): ICD-10-CM

## 2023-10-23 PROCEDURE — RXMED WILLOW AMBULATORY MEDICATION CHARGE: Performed by: INTERNAL MEDICINE

## 2023-10-23 PROCEDURE — 99212 OFFICE O/P EST SF 10 MIN: CPT | Performed by: INTERNAL MEDICINE

## 2023-10-23 RX ORDER — SEMAGLUTIDE 0.68 MG/ML
0.5 INJECTION, SOLUTION SUBCUTANEOUS
Qty: 3 ML | Refills: 5 | Status: SHIPPED | OUTPATIENT
Start: 2023-10-23 | End: 2024-01-08

## 2023-10-23 ASSESSMENT — FIBROSIS 4 INDEX: FIB4 SCORE: 0.86

## 2023-10-23 NOTE — PROGRESS NOTES
Patient Consult Note    Primary care physician: Tee Cedeño M.D.    Reason for consult: Obesity/Weight Management    HPI:  Nazia Lyle is a 63 y.o. old patient who comes in today for evaluation of above stated problem.    Initial Weight: 279 lbs    Initial BMI: 42.42 kg/m2    Most Recent HbA1c:   Lab Results   Component Value Date/Time    HBA1C 5.6 06/07/2023 10:01 AM      Lab Results   Component Value Date/Time    CREATININE 0.55 06/07/2023 10:01 AM        Obesity Medication History and Current Regimen  GLP-1 Agent: Ozempic 0.5 mg subQ once weekly     Previous Obesity Medications:  Phentermine and metformin - Pt states that metformin was only Dc'd d/t being lost to f/u w/ other wt management clinic. Pt w/ hx of IBD - can only tolerate metformin 500 mg 1/2 tab BID.    Lifestyle:    Current Exercise - Pt has been walking more since last visit (1-2x/week for 15-30 min). Plans to use indoor cardio equipment w/ winter season.  Exercise Goal - Increase as tolerated to goal of 150 min/week.     Dietary: Continues to note appetite suppression. Focusing on DASH Diet & more protein. Eating smaller portions more often then.  Breakfast - Protein bar, egg whites, almonds, cottage cheese  Lunch - Salad, tuna  Dinner - Lean protein and vegetable  Snack - Doesn't typically snack  Dessert - Avoids as much as she can. Will indulge w/ fruit if she craves sweets. Occasional pastries.  Beverage - Water, coffee w/ cream & Stevia     Preventative Management  BP regimen (ACE/ARB) - Valsartan 320 mg once daily  ASA - None  Statin - Atorvastatin 40 mg once daily  Last A1c -   Lab Results   Component Value Date/Time    HBA1C 5.6 06/07/2023 10:01 AM         Past Medical History:  Patient Active Problem List    Diagnosis Date Noted    Prediabetes 10/04/2021    Hypercholesterolemia 03/31/2021    Metabolic syndrome 08/19/2020    Weight gain 04/20/2020    Inflammatory bowel disease 04/20/2020    LVH (left ventricular hypertrophy)  04/20/2020    Family history of coronary artery disease 03/17/2020    Vitamin D deficiency 11/22/2019    Hypothyroidism 08/08/2014    Elevated fasting blood sugar 08/08/2014    Hot flash, menopausal 08/08/2014    Morbid obesity with BMI of 40.0-44.9, adult (HCC) 08/08/2014    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    Essential hypertension 09/17/2012    Mixed hyperlipidemia 09/17/2012       Past Surgical History:  Past Surgical History:   Procedure Laterality Date    FUSION AND ARTHROEREISIS, JOINT, FOOT AND ANKLE Left 6/22/2023    Procedure: LEFT ARTHRODESIS TARSOMETATARSAL MIDFOOT FUSION SINGLE JOINT WITH NEUROLYSIS OF DEEP PERONEAL NERVE;  Surgeon: Wm Jose Ro M.D.;  Location: SURGERY [facility];  Service: Orthopedics    HARDWARE REMOVAL UPPER EXTREMITY Left 08/11/2015    Procedure: HARDWARE REMOVAL UPPER EXTREMITY;  Surgeon: Chaka Mckeon M.D.;  Location: SURGERY [facility];  Service:     HAND SURGERY Right 2014    middle finger    APPENDECTOMY      CHOLECYSTECTOMY      PRIMARY C SECTION      TONSILLECTOMY         Allergies:  Latex and Tape    Social History:  Social History     Socioeconomic History    Marital status:      Spouse name: Not on file    Number of children: Not on file    Years of education: Not on file    Highest education level: Not on file   Occupational History    Not on file   Tobacco Use    Smoking status: Never    Smokeless tobacco: Never   Vaping Use    Vaping Use: Never used   Substance and Sexual Activity    Alcohol use: No    Drug use: No    Sexual activity: Not on file   Other Topics Concern    Not on file   Social History Narrative    Not on file     Social Determinants of Health     Financial Resource Strain: Not on file   Food Insecurity: Not on file   Transportation Needs: Not on file   Physical Activity: Not on file   Stress: Not on file   Social Connections: Not on file   Intimate Partner Violence: Not on file   Housing Stability: Not on file       Family  History:  Family History   Problem Relation Age of Onset    Thyroid Mother     Diabetes Father     Hypertension Father     Diabetes Maternal Grandmother     Diabetes Maternal Grandfather     Heart Disease Paternal Grandmother     Cancer Paternal Grandfather        Medications:    Current Outpatient Medications:     Semaglutide,0.25 or 0.5MG/DOS, (OZEMPIC, 0.25 OR 0.5 MG/DOSE,) 2 MG/3ML Solution Pen-injector, Inject 0.5 mg under the skin every 7 days., Disp: 3 mL, Rfl: 5    chlorthalidone (HYGROTON) 25 MG Tab, Take 1 Tablet by mouth every evening., Disp: 90 Tablet, Rfl: 3    levothyroxine (SYNTHROID) 112 MCG Tab, Take 1 Tablet by mouth every morning on an empty stomach., Disp: 90 Tablet, Rfl: 4    traMADol (ULTRAM) 50 MG Tab, Take 50 mg by mouth every 6 hours as needed., Disp: , Rfl:     Probiotic Product (PROBIOTIC PO), Take  by mouth every day., Disp: , Rfl:     Zinc 22.5 MG Tab, Take  by mouth every day., Disp: , Rfl:     potassium chloride SA (KDUR) 20 MEQ Tab CR, Take 2 Tablets by mouth 2 times a day., Disp: 360 Tablet, Rfl: 2    omega-3 acid ethyl esters (LOVAZA) 1 GM capsule, Take 2 Capsules by mouth 2 times a day., Disp: 360 Capsule, Rfl: 3    atorvastatin (LIPITOR) 40 MG Tab, Take 1 Tablet by mouth every evening., Disp: 90 Tablet, Rfl: 3    valsartan (DIOVAN) 320 MG tablet, take 1 tablet by mouth once daily (Patient taking differently: every evening.), Disp: 90 Tablet, Rfl: 3    Ascorbic Acid (VITAMIN C CR) 1500 MG Tab CR, every day., Disp: , Rfl:     Colostrum 500 MG Cap, every day., Disp: , Rfl:     Labs: Reviewed    Physical Examination:  Vital signs: Wt 107 kg (236 lb)   BMI 35.88 kg/m²  Body mass index is 35.88 kg/m².    Assessment and Plan:    1. Obesity/Weight Management  Pt presents to clinic doing well since last visit.   Since last visit, pt has lost ~ 16 lbs. Since starting w/ our clinic she's lost a total of 43 lbs.  Pt now doing more physical activity - will work to further increase.  Continues  to note appetite suppression. Tries to follow mediterranean/DASH Diet.  Ozempic is affordable to her at this time via [facility] 340b program.  Pt declines intensification of GLP1 therapy today.    - Medication changes:  Continue Ozempic 0.5mg subQ once weekly    - Lifestyle changes:  Diet: Maximize lean proteins and veggies. Cut out/down on carbs. Avoid simple sugars.   Exercise: Increase as tolerated    FU: 3 months    Michael Strong, DaylinD, BCACP  07/24/23    CC:   Tee Cedeño M.D.

## 2023-11-02 ENCOUNTER — PHARMACY VISIT (OUTPATIENT)
Dept: PHARMACY | Facility: MEDICAL CENTER | Age: 63
End: 2023-11-02
Payer: COMMERCIAL

## 2023-11-27 PROCEDURE — RXMED WILLOW AMBULATORY MEDICATION CHARGE: Performed by: INTERNAL MEDICINE

## 2023-11-30 ENCOUNTER — PHARMACY VISIT (OUTPATIENT)
Dept: PHARMACY | Facility: MEDICAL CENTER | Age: 63
End: 2023-11-30
Payer: COMMERCIAL

## 2023-12-12 PROCEDURE — RXMED WILLOW AMBULATORY MEDICATION CHARGE: Performed by: INTERNAL MEDICINE

## 2023-12-15 ENCOUNTER — PHARMACY VISIT (OUTPATIENT)
Dept: PHARMACY | Facility: MEDICAL CENTER | Age: 63
End: 2023-12-15
Payer: COMMERCIAL

## 2023-12-15 ENCOUNTER — APPOINTMENT (RX ONLY)
Dept: URBAN - METROPOLITAN AREA CLINIC 36 | Facility: CLINIC | Age: 63
Setting detail: DERMATOLOGY
End: 2023-12-15

## 2023-12-15 DIAGNOSIS — Z41.9 ENCOUNTER FOR PROCEDURE FOR PURPOSES OTHER THAN REMEDYING HEALTH STATE, UNSPECIFIED: ICD-10-CM

## 2023-12-15 PROCEDURE — ? BOTOX

## 2023-12-15 NOTE — PROCEDURE: BOTOX
Dilution (U/0.1 Cc): 0.6
Forehead Units: 0
Show Periorbital Units: Yes
Show Right And Left Pupillary Line Units: No
Detail Level: Detailed
Additional Area 1 Location: upper lip
Forehead Units: 10
Consent: Written consent obtained. Risks include but not limited to lid/brow ptosis, bruising, swelling, diplopia, temporary effect, incomplete chemical denervation.
Glabellar Complex Units: 14
Post-Care Instructions: Patient instructed to not lie down for 4 hours and limit physical activity for 24 hours. Patient instructed not to travel by airplane for 48 hours.
Depressor Anguli Oris Units: 6
Expiration Date (Month Year): 07/2026
Additional Area 3 Location: masseter
Periorbital Skin Units: 20
Incrementing Botox Units: By 0.5 Units
Lot #: [id number]
Additional Area 2 Location: chin

## 2024-01-08 ENCOUNTER — NON-PROVIDER VISIT (OUTPATIENT)
Dept: CARDIOLOGY | Facility: MEDICAL CENTER | Age: 64
End: 2024-01-08
Attending: INTERNAL MEDICINE
Payer: COMMERCIAL

## 2024-01-08 VITALS — WEIGHT: 233.8 LBS | BODY MASS INDEX: 35.55 KG/M2

## 2024-01-08 DIAGNOSIS — E66.01 MORBID OBESITY WITH BMI OF 40.0-44.9, ADULT (HCC): ICD-10-CM

## 2024-01-08 DIAGNOSIS — R73.01 ELEVATED FASTING BLOOD SUGAR: ICD-10-CM

## 2024-01-08 DIAGNOSIS — R73.03 PREDIABETES: ICD-10-CM

## 2024-01-08 DIAGNOSIS — E78.00 HYPERCHOLESTEROLEMIA: ICD-10-CM

## 2024-01-08 PROCEDURE — 99212 OFFICE O/P EST SF 10 MIN: CPT | Performed by: INTERNAL MEDICINE

## 2024-01-08 RX ORDER — SEMAGLUTIDE 1.34 MG/ML
1 INJECTION, SOLUTION SUBCUTANEOUS
Qty: 3 ML | Refills: 5 | Status: SHIPPED | OUTPATIENT
Start: 2024-01-08

## 2024-01-08 ASSESSMENT — FIBROSIS 4 INDEX: FIB4 SCORE: 0.86

## 2024-01-08 NOTE — PROGRESS NOTES
Patient Consult Note    Primary care physician: Tee Cedeño M.D.    Reason for consult: Obesity/Weight Management    HPI:  Nazia Lyle is a 63 y.o. old patient who comes in today for evaluation of above stated problem.    Initial Weight: 279 lbs    Initial BMI: 42.42 kg/m2    Most Recent HbA1c:   Lab Results   Component Value Date/Time    HBA1C 5.6 06/07/2023 10:01 AM      Lab Results   Component Value Date/Time    CREATININE 0.55 06/07/2023 10:01 AM        Obesity Medication History and Current Regimen  GLP-1 Agent: Ozempic 0.5 mg subQ once weekly     Previous Obesity Medications:  Phentermine and metformin - Pt states that metformin was only Dc'd d/t being lost to f/u w/ other wt management clinic. Pt w/ hx of IBD - can only tolerate metformin 500 mg 1/2 tab BID.    Lifestyle:  -Current Exercise - Pt has been walking more since last visit (1-2x/week for 15-30 min). Plans to use indoor cardio equipment w/ winter season - elliptical.  -Exercise Goal - Increase as tolerated to goal of 150 min/week.     -Dietary: Continues to note appetite suppression, but states that she feels as though her appetite suppression is less so. Focusing on DASH Diet & more protein. Eating smaller portions more often then.  Breakfast - Protein bar, egg whites, almonds, cottage cheese  Lunch - Salad, tuna, protein drinks lately  Dinner - Lean protein and vegetable  Snack - Doesn't typically snack  Dessert - Avoids as much as she can. Will indulge w/ fruit if she craves sweets. Occasional pastries.  Beverage - Water, coffee w/ cream & Stevia     Preventative Management  BP regimen (ACE/ARB) - Valsartan 320 mg once daily  ASA - None  Statin - Atorvastatin 40 mg once daily  Last A1c -   Lab Results   Component Value Date/Time    HBA1C 5.6 06/07/2023 10:01 AM         Past Medical History:  Patient Active Problem List    Diagnosis Date Noted    Prediabetes 10/04/2021    Hypercholesterolemia 03/31/2021    Metabolic syndrome  08/19/2020    Weight gain 04/20/2020    Inflammatory bowel disease 04/20/2020    LVH (left ventricular hypertrophy) 04/20/2020    Family history of coronary artery disease 03/17/2020    Vitamin D deficiency 11/22/2019    Hypothyroidism 08/08/2014    Elevated fasting blood sugar 08/08/2014    Hot flash, menopausal 08/08/2014    Morbid obesity with BMI of 40.0-44.9, adult (HCC) 08/08/2014    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    Essential hypertension 09/17/2012    Mixed hyperlipidemia 09/17/2012       Past Surgical History:  Past Surgical History:   Procedure Laterality Date    FUSION AND ARTHROEREISIS, JOINT, FOOT AND ANKLE Left 6/22/2023    Procedure: LEFT ARTHRODESIS TARSOMETATARSAL MIDFOOT FUSION SINGLE JOINT WITH NEUROLYSIS OF DEEP PERONEAL NERVE;  Surgeon: Wm Jose Ro M.D.;  Location: SURGERY [facility];  Service: Orthopedics    HARDWARE REMOVAL UPPER EXTREMITY Left 08/11/2015    Procedure: HARDWARE REMOVAL UPPER EXTREMITY;  Surgeon: Chaka Mckeon M.D.;  Location: SURGERY [facility];  Service:     HAND SURGERY Right 2014    middle finger    APPENDECTOMY      CHOLECYSTECTOMY      PRIMARY C SECTION      TONSILLECTOMY         Allergies:  Latex and Tape    Social History:  Social History     Socioeconomic History    Marital status:      Spouse name: Not on file    Number of children: Not on file    Years of education: Not on file    Highest education level: Not on file   Occupational History    Not on file   Tobacco Use    Smoking status: Never    Smokeless tobacco: Never   Vaping Use    Vaping Use: Never used   Substance and Sexual Activity    Alcohol use: No    Drug use: No    Sexual activity: Not on file   Other Topics Concern    Not on file   Social History Narrative    Not on file     Social Determinants of Health     Financial Resource Strain: Not on file   Food Insecurity: Not on file   Transportation Needs: Not on file   Physical Activity: Not on file   Stress: Not on file    Social Connections: Not on file   Intimate Partner Violence: Not on file   Housing Stability: Not on file       Family History:  Family History   Problem Relation Age of Onset    Thyroid Mother     Diabetes Father     Hypertension Father     Diabetes Maternal Grandmother     Diabetes Maternal Grandfather     Heart Disease Paternal Grandmother     Cancer Paternal Grandfather        Medications:    Current Outpatient Medications:     Semaglutide, 1 MG/DOSE, (OZEMPIC, 1 MG/DOSE,) 4 MG/3ML Solution Pen-injector, Inject 1 mg under the skin every 7 days., Disp: 3 mL, Rfl: 5    chlorthalidone (HYGROTON) 25 MG Tab, Take 1 Tablet by mouth every evening., Disp: 90 Tablet, Rfl: 3    levothyroxine (SYNTHROID) 112 MCG Tab, Take 1 Tablet by mouth every morning on an empty stomach., Disp: 90 Tablet, Rfl: 4    traMADol (ULTRAM) 50 MG Tab, Take 50 mg by mouth every 6 hours as needed., Disp: , Rfl:     Probiotic Product (PROBIOTIC PO), Take  by mouth every day., Disp: , Rfl:     Zinc 22.5 MG Tab, Take  by mouth every day., Disp: , Rfl:     potassium chloride SA (KDUR) 20 MEQ Tab CR, Take 2 Tablets by mouth 2 times a day., Disp: 360 Tablet, Rfl: 2    omega-3 acid ethyl esters (LOVAZA) 1 GM capsule, Take 2 Capsules by mouth 2 times a day., Disp: 360 Capsule, Rfl: 3    atorvastatin (LIPITOR) 40 MG Tab, Take 1 Tablet by mouth every evening., Disp: 90 Tablet, Rfl: 3    valsartan (DIOVAN) 320 MG tablet, take 1 tablet by mouth once daily (Patient taking differently: every evening.), Disp: 90 Tablet, Rfl: 3    Ascorbic Acid (VITAMIN C CR) 1500 MG Tab CR, every day., Disp: , Rfl:     Colostrum 500 MG Cap, every day., Disp: , Rfl:     Labs: Reviewed    Physical Examination:  Vital signs: Wt 106 kg (233 lb 12.8 oz)   BMI 35.55 kg/m²  Body mass index is 35.55 kg/m².    Assessment and Plan:    1. Obesity/Weight Management  Pt presents to clinic doing well since last visit.   Since last visit, pt has lost ~ 2 lbs. Since starting w/ our clinic  she's lost a total of 45 lbs.  Pt admits to doing less physical activity lately w/ the winter weather - encouraged to begin using her indoor cardio equipment.  Pt admits to less appetite suppression since last visit. She does follow mediterranean/DASH Diet as best as she can overall..  Ozempic is affordable to her at this time via [facility] 340b program.  Used shared decision making to intensify GLP1 therapy today given therapeutic plateau.    - Medication changes:  INCREASE Ozempic up to 1 mg subQ once weekly    - Lifestyle changes:  Diet: Maximize lean proteins and veggies. Cut out/down on carbs. Avoid simple sugars.   Exercise: Increase as tolerated    FU: 3 months    Michael Strong, PharmD, BCACP  07/24/23    CC:   Tee Cedeño M.D.

## 2024-01-09 ENCOUNTER — TELEPHONE (OUTPATIENT)
Dept: CARDIOLOGY | Facility: MEDICAL CENTER | Age: 64
End: 2024-01-09
Payer: COMMERCIAL

## 2024-01-09 NOTE — TELEPHONE ENCOUNTER
Attempted to contact patient at [phone] to discuss [facility] and services/benefits offered. No answer, left voicemail.    Meg Fay

## 2024-01-10 PROCEDURE — RXMED WILLOW AMBULATORY MEDICATION CHARGE: Performed by: INTERNAL MEDICINE

## 2024-01-23 ENCOUNTER — PHARMACY VISIT (OUTPATIENT)
Dept: PHARMACY | Facility: MEDICAL CENTER | Age: 64
End: 2024-01-23
Payer: COMMERCIAL

## 2024-01-26 NOTE — PROGRESS NOTES
[facility] Follow-up Visit    Date of Visit: 1/31/2024     CC:  Follow-up for MADDI management      HPI:  Nazia Lyle is a very pleasant 63 y.o. year old female never smoker, with a PMHx of MADDI on CPAP, elevated BMI, inflammatory bowel disease, HTN, who presented to the [facility] for a regular follow up. Last seen in the office on 1/24/2023 with Dr. Pruitt.     Patient presents for compliance.     Sleep more restful with CPAP usage    Denies morning headaches.    Denies daytime drowsiness / driving drowsy.     Denies any issues falling asleep.      Snoring / Gasping / Apneas    Palpitations    Dry mouth    Aerophagia    Mask leak / Skin irritation    Goes to bed:  Wakes up:  Naps (frequency and duration):  Awakenings:  Issues falling back to sleep?        DME provider: ***  Device: ***  Settings:***  When:***  Mask: ***  Chin strap: {YES/NO:20266}    Cleaning regimen: ***    Compliance:  Compliance data reviewed showing ***% usage > 4hours in last 30 *** days. Average AHI *** events/hour. 95% pressure *** CWP. 95% leaks *** L/min. Patient continues to use and benefit from machine. ***     Sleep History:  HSS 6/17/2021-      Patient Active Problem List    Diagnosis Date Noted    Prediabetes 10/04/2021    Hypercholesterolemia 03/31/2021    Metabolic syndrome 08/19/2020    Weight gain 04/20/2020    Inflammatory bowel disease 04/20/2020    LVH (left ventricular hypertrophy) 04/20/2020    Family history of coronary artery disease 03/17/2020    Vitamin D deficiency 11/22/2019    Hypothyroidism 08/08/2014    Elevated fasting blood sugar 08/08/2014    Hot flash, menopausal 08/08/2014    Morbid obesity with BMI of 40.0-44.9, adult (HCC) 08/08/2014    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    Essential hypertension 09/17/2012    Mixed hyperlipidemia 09/17/2012     Past Medical History:   Diagnosis Date    Anesthesia     PONV    Arthritis     osteoarthritis, hands    Bowel habit changes     IBD,,  occasional diarrhea    Chickenpox     Dyslipidemia 09/17/2012    Fatty liver     Urdu measles     as a child    Heart murmur     High cholesterol     History of kidney stones     HTN (hypertension) 09/17/2012    Hypothyroid     IBD (inflammatory bowel disease)     Obesity 09/17/2012    PONV (postoperative nausea and vomiting)     Pre-diabetes     Sleep apnea     CPAP      Past Surgical History:   Procedure Laterality Date    FUSION AND ARTHROEREISIS, JOINT, FOOT AND ANKLE Left 6/22/2023    Procedure: LEFT ARTHRODESIS TARSOMETATARSAL MIDFOOT FUSION SINGLE JOINT WITH NEUROLYSIS OF DEEP PERONEAL NERVE;  Surgeon: Wm Jose Ro M.D.;  Location: SURGERY [facility];  Service: Orthopedics    HARDWARE REMOVAL UPPER EXTREMITY Left 08/11/2015    Procedure: HARDWARE REMOVAL UPPER EXTREMITY;  Surgeon: Chaka Mckeon M.D.;  Location: SURGERY [facility];  Service:     HAND SURGERY Right 2014    middle finger    APPENDECTOMY      CHOLECYSTECTOMY      PRIMARY C SECTION      TONSILLECTOMY       Family History   Problem Relation Age of Onset    Thyroid Mother     Diabetes Father     Hypertension Father     Diabetes Maternal Grandmother     Diabetes Maternal Grandfather     Heart Disease Paternal Grandmother     Cancer Paternal Grandfather      Social History     Socioeconomic History    Marital status:      Spouse name: Not on file    Number of children: Not on file    Years of education: Not on file    Highest education level: Not on file   Occupational History    Not on file   Tobacco Use    Smoking status: Never    Smokeless tobacco: Never   Vaping Use    Vaping Use: Never used   Substance and Sexual Activity    Alcohol use: No    Drug use: No    Sexual activity: Not on file   Other Topics Concern    Not on file   Social History Narrative    Not on file     Social Determinants of Health     Financial Resource Strain: Not on file   Food Insecurity: Not on file   Transportation Needs: Not on file   Physical  Activity: Not on file   Stress: Not on file   Social Connections: Not on file   Intimate Partner Violence: Not on file   Housing Stability: Not on file     Current Outpatient Medications   Medication Sig Dispense Refill    Semaglutide, 1 MG/DOSE, (OZEMPIC, 1 MG/DOSE,) 4 MG/3ML Solution Pen-injector Inject 1 mg under the skin every 7 days. 3 mL 5    chlorthalidone (HYGROTON) 25 MG Tab Take 1 Tablet by mouth every evening. 90 Tablet 3    levothyroxine (SYNTHROID) 112 MCG Tab Take 1 Tablet by mouth every morning on an empty stomach. 90 Tablet 4    traMADol (ULTRAM) 50 MG Tab Take 50 mg by mouth every 6 hours as needed.      Probiotic Product (PROBIOTIC PO) Take  by mouth every day.      Zinc 22.5 MG Tab Take  by mouth every day.      potassium chloride SA (KDUR) 20 MEQ Tab CR Take 2 Tablets by mouth 2 times a day. 360 Tablet 2    omega-3 acid ethyl esters (LOVAZA) 1 GM capsule Take 2 Capsules by mouth 2 times a day. 360 Capsule 3    atorvastatin (LIPITOR) 40 MG Tab Take 1 Tablet by mouth every evening. 90 Tablet 3    valsartan (DIOVAN) 320 MG tablet take 1 tablet by mouth once daily (Patient taking differently: every evening.) 90 Tablet 3    Ascorbic Acid (VITAMIN C CR) 1500 MG Tab CR every day.      Colostrum 500 MG Cap every day.       No current facility-administered medications for this visit.      ALLERGIES: Latex and Tape    ROS:  Constitutional: Denies fever, chills, sweats,  weight loss, fatigue  Cardiovascular: Denies chest pain, tightness, palpitations, swelling in legs/feet  Respiratory: Denies shortness of breath, cough, sputum, wheezing, painful breathing   Sleep: per HPI  Gastrointestinal: Denies  difficulty swallowing, nausea, abdominal pain, diarrhea, constipation, heartburn.  Musculoskeletal: Denies painful joints, sore muscles,       PHYSICAL EXAM:  There were no vitals taken for this visit.  Appearance: Well-nourished, well-developed, no acute distress  Eyes:  No scleral icterus , EOMI  ENMT: No  redness of the oropharynx***  Lung auscultation:  No wheezes rhonchi rubs or rales***  Cardiac: No murmurs, rubs, or gallops; regular rhythm, normal rate; no edema***  Musculoskeletal:  Grossly normal; gait and station normal; digits and nails normal  Skin:  No rashes, petechiae, cyanosis  Neurologic: without focal signs; oriented to person, time, place, and purpose; judgement intact  Psychiatric:  No depression, anxiety, agitation  Mallampati score: Class ***    Assessment and Plan:    The medical record was reviewed.    Diagnostic and titration nocturnal polysomnogram's, home sleep apnea tests, continuous nocturnal oximetry results, multiple sleep latency tests, and compliance reports reviewed.    Problem List Items Addressed This Visit    None      Have advised the patient to follow up with the appropriate healthcare practitioners for all other medical problems and issues.    No follow-ups on file.      Please note portions of this record was created using voice recognition software. I have made every reasonable attempt to correct obvious errors, but I expect that there are errors of grammar and possibly content I did not discover before finalizing the note.    Time spent in record review prior to patient arrival, reviewing results, and in face-to-face encounter totaled *** min.  __________  ESDRAS Gray  Pulmonary & Sleep Medicine  [facility]

## 2024-01-29 ENCOUNTER — TELEPHONE (OUTPATIENT)
Dept: PHARMACY | Facility: MEDICAL CENTER | Age: 64
End: 2024-01-29
Payer: COMMERCIAL

## 2024-01-29 NOTE — TELEPHONE ENCOUNTER
Contact: [phone]    Nazia Lyle       Phone number: [phone]    Name of person spoken with and relationship to patient: Nazia, self   Patient’s Adherence:            How patient is doing on medication:  well    How many missed doses and reason: 0    Any new medications: no    Any new conditions: no    Any new allergies: no    Any new side effects: no     Any new diagnoses: no     How many doses remainin prior to receiving this fill on     Did patient want to speak with pharmacist: no  Delivery:            Delivery date and method:  patient already picked up on . Adherence questions completed    Needs by Date:  na    Signature required:  no    Any additional details for :  morris  Teach Appointment Date:  na  Shipping Address:  [address] [place] [id number]  Medication(name, strength and dose):  [id number] / Ozempic (1 MG/DOSE) 4 MG/3ML Sopn SubQ Inject every 7 days  Copay: $391.04  Payment Method: ccof  Supplies:  na  Additional info:  HUSSAIN Mandujano. She stated doing well on the medication and new increased dose is doing good so far. She has existing intestinal issues, but the new dose has not aggravated or worsened for her. Her insurance will not cover this strength, so she gets special pricing per pharmacy approval. No further questions/concerns at this time

## 2024-01-31 ENCOUNTER — APPOINTMENT (OUTPATIENT)
Dept: SLEEP MEDICINE | Facility: MEDICAL CENTER | Age: 64
End: 2024-01-31
Attending: PHYSICIAN ASSISTANT
Payer: COMMERCIAL

## 2024-02-02 ENCOUNTER — APPOINTMENT (RX ONLY)
Dept: URBAN - METROPOLITAN AREA CLINIC 36 | Facility: CLINIC | Age: 64
Setting detail: DERMATOLOGY
End: 2024-02-02

## 2024-02-02 DIAGNOSIS — Z41.9 ENCOUNTER FOR PROCEDURE FOR PURPOSES OTHER THAN REMEDYING HEALTH STATE, UNSPECIFIED: ICD-10-CM

## 2024-02-02 PROCEDURE — ? KYBELLA INJECTION

## 2024-02-02 NOTE — PROCEDURE: KYBELLA INJECTION
Lot #: [id number]
Treatment Area: submental chin
Price (Use Numbers Only, No Special Characters Or $): 0.00
Anesthesia Type: 1% lidocaine with 1:200,000 epinephrine and a 1:10 solution of 8.4% sodium bicarbonate
Anesthesia Volume In Cc: 0.5
Number Of Grid Dots (Won't Render If 0): 0
Treatment Number (Won't Render If 0): 1
Procedure Text: The treatment areas were cleansed. Kybella was administered using the parameters mentioned above.
Kybella Volume In Cc (Won't Render If 0): 2
Detail Level: Detailed
Consent: Written consent obtained. Risks include but not limited to bruising, beading, irregular texture, ulceration, infection, allergic reaction, scar formation, incomplete augmentation, temporary nature, procedural pain.
Post-Care Instructions: Patient instructed to apply ice to reduce swelling.
Expiration Date (Month Year): 9/2024

## 2024-02-16 ENCOUNTER — TELEPHONE (OUTPATIENT)
Dept: VASCULAR LAB | Facility: MEDICAL CENTER | Age: 64
End: 2024-02-16

## 2024-02-16 DIAGNOSIS — R73.03 PREDIABETES: ICD-10-CM

## 2024-02-16 DIAGNOSIS — R73.01 ELEVATED FASTING BLOOD SUGAR: ICD-10-CM

## 2024-02-16 DIAGNOSIS — E78.00 HYPERCHOLESTEROLEMIA: ICD-10-CM

## 2024-02-16 DIAGNOSIS — E66.01 MORBID OBESITY WITH BMI OF 40.0-44.9, ADULT (HCC): ICD-10-CM

## 2024-02-16 NOTE — TELEPHONE ENCOUNTER
Caller: Nazia Lyle     Topic/issue: Patient requested to speak with Michael MIRZA  Patient is part of a discount program for her medication Semaglutide, 1 MG/DOSE, (OZEMPIC, 1 MG/DOSE,) 4 MG/3ML Solution Pen-injector . She was notified that there was missing information preventing her from getting the discount for the medication.    Callback Number: [phone]     Thank you  Mandy BAILEY

## 2024-02-17 NOTE — TELEPHONE ENCOUNTER
Called and spoke to patient. She states that she was informed by pharmacy that additional documentation is needed to qualify for 340b pricing. Will clarify with [facility].    Karolyn Prado, PharmD, BCACP

## 2024-02-19 PROCEDURE — RXMED WILLOW AMBULATORY MEDICATION CHARGE: Performed by: INTERNAL MEDICINE

## 2024-02-20 ENCOUNTER — TELEPHONE (OUTPATIENT)
Dept: PHARMACY | Facility: MEDICAL CENTER | Age: 64
End: 2024-02-20
Payer: COMMERCIAL

## 2024-02-21 RX ORDER — SEMAGLUTIDE 1.34 MG/ML
1 INJECTION, SOLUTION SUBCUTANEOUS
Qty: 3 ML | Refills: 5 | Status: SHIPPED | OUTPATIENT
Start: 2024-02-21

## 2024-02-21 NOTE — TELEPHONE ENCOUNTER
Per [facility], pt's cardiologist does not qualify for 340b pricing. Sent in Rx under different cardiology provider.    Called and notified pt of the above.    Michael Strong, PharmD, BCACP

## 2024-02-22 ENCOUNTER — PHARMACY VISIT (OUTPATIENT)
Dept: PHARMACY | Facility: MEDICAL CENTER | Age: 64
End: 2024-02-22
Payer: COMMERCIAL

## 2024-02-22 ENCOUNTER — HOSPITAL ENCOUNTER (OUTPATIENT)
Dept: RADIOLOGY | Facility: MEDICAL CENTER | Age: 64
End: 2024-02-22
Attending: STUDENT IN AN ORGANIZED HEALTH CARE EDUCATION/TRAINING PROGRAM
Payer: COMMERCIAL

## 2024-02-22 ENCOUNTER — APPOINTMENT (RX ONLY)
Dept: URBAN - METROPOLITAN AREA CLINIC 6 | Facility: CLINIC | Age: 64
Setting detail: DERMATOLOGY
End: 2024-02-22

## 2024-02-22 ENCOUNTER — OFFICE VISIT (OUTPATIENT)
Dept: SLEEP MEDICINE | Facility: MEDICAL CENTER | Age: 64
End: 2024-02-22
Attending: PHYSICIAN ASSISTANT
Payer: COMMERCIAL

## 2024-02-22 VITALS
HEART RATE: 98 BPM | OXYGEN SATURATION: 92 % | HEIGHT: 68 IN | SYSTOLIC BLOOD PRESSURE: 104 MMHG | RESPIRATION RATE: 20 BRPM | BODY MASS INDEX: 34.71 KG/M2 | DIASTOLIC BLOOD PRESSURE: 62 MMHG | WEIGHT: 229 LBS

## 2024-02-22 DIAGNOSIS — D18.0 HEMANGIOMA: ICD-10-CM

## 2024-02-22 DIAGNOSIS — Z71.89 OTHER SPECIFIED COUNSELING: ICD-10-CM

## 2024-02-22 DIAGNOSIS — G47.33 OSA (OBSTRUCTIVE SLEEP APNEA): ICD-10-CM

## 2024-02-22 DIAGNOSIS — L82.1 OTHER SEBORRHEIC KERATOSIS: ICD-10-CM

## 2024-02-22 DIAGNOSIS — E66.9 OBESITY (BMI 30.0-34.9): ICD-10-CM

## 2024-02-22 DIAGNOSIS — Z12.31 BREAST CANCER SCREENING BY MAMMOGRAM: ICD-10-CM

## 2024-02-22 DIAGNOSIS — L82.0 INFLAMED SEBORRHEIC KERATOSIS: ICD-10-CM

## 2024-02-22 DIAGNOSIS — L81.4 OTHER MELANIN HYPERPIGMENTATION: ICD-10-CM

## 2024-02-22 DIAGNOSIS — Z86.007 PERSONAL HISTORY OF IN-SITU NEOPLASM OF SKIN: ICD-10-CM

## 2024-02-22 DIAGNOSIS — D22 MELANOCYTIC NEVI: ICD-10-CM

## 2024-02-22 PROBLEM — D22.62 MELANOCYTIC NEVI OF LEFT UPPER LIMB, INCLUDING SHOULDER: Status: ACTIVE | Noted: 2024-02-22

## 2024-02-22 PROBLEM — D22.71 MELANOCYTIC NEVI OF RIGHT LOWER LIMB, INCLUDING HIP: Status: ACTIVE | Noted: 2024-02-22

## 2024-02-22 PROBLEM — D48.5 NEOPLASM OF UNCERTAIN BEHAVIOR OF SKIN: Status: ACTIVE | Noted: 2024-02-22

## 2024-02-22 PROBLEM — D22.72 MELANOCYTIC NEVI OF LEFT LOWER LIMB, INCLUDING HIP: Status: ACTIVE | Noted: 2024-02-22

## 2024-02-22 PROBLEM — D22.61 MELANOCYTIC NEVI OF RIGHT UPPER LIMB, INCLUDING SHOULDER: Status: ACTIVE | Noted: 2024-02-22

## 2024-02-22 PROBLEM — D22.5 MELANOCYTIC NEVI OF TRUNK: Status: ACTIVE | Noted: 2024-02-22

## 2024-02-22 PROBLEM — D18.01 HEMANGIOMA OF SKIN AND SUBCUTANEOUS TISSUE: Status: ACTIVE | Noted: 2024-02-22

## 2024-02-22 PROCEDURE — 3078F DIAST BP <80 MM HG: CPT | Performed by: PHYSICIAN ASSISTANT

## 2024-02-22 PROCEDURE — 99213 OFFICE O/P EST LOW 20 MIN: CPT | Performed by: PHYSICIAN ASSISTANT

## 2024-02-22 PROCEDURE — 3074F SYST BP LT 130 MM HG: CPT | Performed by: PHYSICIAN ASSISTANT

## 2024-02-22 PROCEDURE — ? COUNSELING

## 2024-02-22 PROCEDURE — ? LIQUID NITROGEN

## 2024-02-22 PROCEDURE — 99213 OFFICE O/P EST LOW 20 MIN: CPT | Mod: 25

## 2024-02-22 PROCEDURE — ? DIAGNOSIS COMMENT

## 2024-02-22 PROCEDURE — ? BIOPSY BY SHAVE METHOD

## 2024-02-22 PROCEDURE — 11102 TANGNTL BX SKIN SINGLE LES: CPT | Mod: 59

## 2024-02-22 PROCEDURE — 77067 SCR MAMMO BI INCL CAD: CPT

## 2024-02-22 PROCEDURE — 17111 DESTRUCTION B9 LESIONS 15/>: CPT

## 2024-02-22 ASSESSMENT — LOCATION SIMPLE DESCRIPTION DERM
LOCATION SIMPLE: RIGHT UPPER ARM
LOCATION SIMPLE: LEFT SHOULDER
LOCATION SIMPLE: LEFT THIGH
LOCATION SIMPLE: LEFT FOREARM
LOCATION SIMPLE: RIGHT CALF
LOCATION SIMPLE: RIGHT EYEBROW
LOCATION SIMPLE: RIGHT BREAST
LOCATION SIMPLE: RIGHT TEMPLE
LOCATION SIMPLE: LEFT UPPER ARM
LOCATION SIMPLE: LEFT LOWER BACK
LOCATION SIMPLE: RIGHT FOREHEAD
LOCATION SIMPLE: SCALP
LOCATION SIMPLE: LEFT CHEEK
LOCATION SIMPLE: RIGHT PRETIBIAL REGION
LOCATION SIMPLE: LEFT BREAST
LOCATION SIMPLE: ABDOMEN
LOCATION SIMPLE: RIGHT UPPER BACK
LOCATION SIMPLE: RIGHT POSTERIOR THIGH
LOCATION SIMPLE: RIGHT FOREARM
LOCATION SIMPLE: LEFT POSTERIOR THIGH
LOCATION SIMPLE: CHEST
LOCATION SIMPLE: LEFT UPPER BACK
LOCATION SIMPLE: RIGHT THIGH
LOCATION SIMPLE: LEFT PRETIBIAL REGION

## 2024-02-22 ASSESSMENT — LOCATION DETAILED DESCRIPTION DERM
LOCATION DETAILED: RIGHT INFERIOR MEDIAL UPPER BACK
LOCATION DETAILED: RIGHT PROXIMAL POSTERIOR THIGH
LOCATION DETAILED: RIGHT DISTAL POSTERIOR THIGH
LOCATION DETAILED: RIGHT VENTRAL PROXIMAL FOREARM
LOCATION DETAILED: RIGHT INFERIOR LATERAL FOREHEAD
LOCATION DETAILED: EPIGASTRIC SKIN
LOCATION DETAILED: LEFT MID-UPPER BACK
LOCATION DETAILED: RIGHT MEDIAL BREAST 2-3:00 REGION
LOCATION DETAILED: LEFT ANTERIOR DISTAL UPPER ARM
LOCATION DETAILED: RIGHT SUPERIOR FOREHEAD
LOCATION DETAILED: RIGHT PROXIMAL PRETIBIAL REGION
LOCATION DETAILED: RIGHT ANTERIOR DISTAL THIGH
LOCATION DETAILED: RIGHT ANTERIOR DISTAL UPPER ARM
LOCATION DETAILED: LEFT MEDIAL SUPERIOR CHEST
LOCATION DETAILED: RIGHT LATERAL EYEBROW
LOCATION DETAILED: LEFT PROXIMAL POSTERIOR THIGH
LOCATION DETAILED: LEFT PROXIMAL DORSAL FOREARM
LOCATION DETAILED: LEFT SUPERIOR MEDIAL MIDBACK
LOCATION DETAILED: LEFT INFERIOR CENTRAL MALAR CHEEK
LOCATION DETAILED: RIGHT CENTRAL PARIETAL SCALP
LOCATION DETAILED: LEFT LATERAL ABDOMEN
LOCATION DETAILED: LEFT VENTRAL PROXIMAL FOREARM
LOCATION DETAILED: RIGHT MID TEMPLE
LOCATION DETAILED: LEFT ANTERIOR SHOULDER
LOCATION DETAILED: LEFT VENTRAL DISTAL FOREARM
LOCATION DETAILED: LEFT PROXIMAL PRETIBIAL REGION
LOCATION DETAILED: LEFT MEDIAL BREAST 10-11:00 REGION
LOCATION DETAILED: RIGHT PROXIMAL DORSAL FOREARM
LOCATION DETAILED: LEFT DISTAL POSTERIOR THIGH
LOCATION DETAILED: RIGHT VENTRAL DISTAL FOREARM
LOCATION DETAILED: RIGHT DISTAL CALF
LOCATION DETAILED: LEFT LATERAL SUPERIOR CHEST
LOCATION DETAILED: LEFT AXILLARY TAIL OF BREAST
LOCATION DETAILED: LEFT ANTERIOR DISTAL THIGH

## 2024-02-22 ASSESSMENT — ENCOUNTER SYMPTOMS
SINUS PAIN: 0
ROS GI COMMENTS: NO DENTURES, NO MISSING TEETH OR SWALLOWING ISSUES
COUGH: 0
FEVER: 0
CHILLS: 0
ORTHOPNEA: 0
SPUTUM PRODUCTION: 1
TREMORS: 0
SHORTNESS OF BREATH: 0
INSOMNIA: 0
HEARTBURN: 0
WHEEZING: 0
HEADACHES: 1
SORE THROAT: 0
WEIGHT LOSS: 0
DIZZINESS: 0
PALPITATIONS: 0

## 2024-02-22 ASSESSMENT — LOCATION ZONE DERM
LOCATION ZONE: ARM
LOCATION ZONE: TRUNK
LOCATION ZONE: FACE
LOCATION ZONE: SCALP
LOCATION ZONE: LEG

## 2024-02-22 ASSESSMENT — FIBROSIS 4 INDEX: FIB4 SCORE: 0.87

## 2024-02-22 ASSESSMENT — PATIENT HEALTH QUESTIONNAIRE - PHQ9: CLINICAL INTERPRETATION OF PHQ2 SCORE: 0

## 2024-02-22 NOTE — PROGRESS NOTES
"Chief Complaint   Patient presents with    Follow-Up     MADDI. Last seen 01/24/23       HPI:  Nazia Lyle is a 64 y.o. year old female here today for follow-up on obstructive sleep apnea.  Patient last seen in clinic 1/24/2023 by Dr. Edvin Pruitt.    Past Medical History: Essential hypertension, mixed hyperlipidemia, obesity, IBS, metabolic syndrome, hypertension, fatty liver, heart murmur, left ventricular hypertrophy.    Vitals:  /62 (BP Location: Left arm, Patient Position: Sitting, BP Cuff Size: Large adult)   Pulse 98   Resp 20   Ht 1.727 m (5' 8\")   Wt 104 kg (229 lb)   SpO2 92% BMI of 34.82 kg/m², patient reports weight down approximately 50 pounds since starting Ozempic.    Recent Imaging: None    Echocardiogram obtained 12/3/2021 demonstrated normal left ventricular chamber size, systolic and diastolic function, LVEF estimated 60%.  Normal right ventricular size and systolic function.  Mild mitral annular calcification with thickened leaflets, mild mitral regurgitation, mild tricuspid regurgitation with estimated RVSP of 25 mmHg.    Currently using  Loginza  auto CPAP @5-15 cm H20 pressure; compliance reviewed for 1/22/2024 through 2/20/2024, days used 24/30 or 80%, average daily usage 5 hours 42 minutes, 76.7% of days greater than or equal to 4 hours, minimal mask leak at 48 seconds, AHI 2.2 events per hour.  See media for full report.    Device obtained prior to 2021  DME provider [facility] in [place]  Mask interface nasal pillows    Home sleep study obtained 6/17/2021 demonstrated findings consistent with severe obstructive sleep apnea with overall DANILO of 60.6 events per hour increased to 71.7 events per hour in supine position.  Low O2 sat of 63% and sats less than 89% for 424 minutes of flow evaluated time..    Sleep schedule goes to bed around 10 PM, wakens 530 to 6:30 AM , and gets up during the night about 1 time   Symptoms denies day time somnolence and denies morning " headache    Stop Bang Score 5 (2/23/2024  7:09 AM)         Review of Systems   Constitutional:  Negative for chills, fever, malaise/fatigue and weight loss.   HENT:  Negative for congestion, hearing loss, nosebleeds, sinus pain, sore throat and tinnitus.    Eyes:         Presc glasses    Respiratory:  Positive for sputum production (occasional). Negative for cough, shortness of breath and wheezing.    Cardiovascular:  Negative for chest pain, palpitations, orthopnea and leg swelling.   Gastrointestinal:  Negative for heartburn.        No dentures, no missing teeth or swallowing issues    Neurological:  Positive for headaches. Negative for dizziness and tremors.   Psychiatric/Behavioral:  The patient does not have insomnia.        Past Medical History:   Diagnosis Date    Anesthesia     PONV    Arthritis     osteoarthritis, hands    Bowel habit changes     IBD,, occasional diarrhea    Chickenpox     Dyslipidemia 09/17/2012    Fatty liver     Swazi measles     as a child    Heart murmur     High cholesterol     History of kidney stones     HTN (hypertension) 09/17/2012    Hypothyroid     IBD (inflammatory bowel disease)     Obesity 09/17/2012    PONV (postoperative nausea and vomiting)     Pre-diabetes     Sleep apnea     CPAP       Past Surgical History:   Procedure Laterality Date    FUSION AND ARTHROEREISIS, JOINT, FOOT AND ANKLE Left 6/22/2023    Procedure: LEFT ARTHRODESIS TARSOMETATARSAL MIDFOOT FUSION SINGLE JOINT WITH NEUROLYSIS OF DEEP PERONEAL NERVE;  Surgeon: Wm Jose Ro M.D.;  Location: SURGERY [facility];  Service: Orthopedics    HARDWARE REMOVAL UPPER EXTREMITY Left 08/11/2015    Procedure: HARDWARE REMOVAL UPPER EXTREMITY;  Surgeon: Chaka Mckeon M.D.;  Location: SURGERY [facility];  Service:     HAND SURGERY Right 2014    middle finger    APPENDECTOMY      CHOLECYSTECTOMY      PRIMARY C SECTION      TONSILLECTOMY         Family History   Problem Relation Age of Onset    Thyroid Mother      Diabetes Father     Hypertension Father     Diabetes Maternal Grandmother     Diabetes Maternal Grandfather     Heart Disease Paternal Grandmother     Cancer Paternal Grandfather        Social History     Socioeconomic History    Marital status:      Spouse name: Not on file    Number of children: Not on file    Years of education: Not on file    Highest education level: Not on file   Occupational History    Not on file   Tobacco Use    Smoking status: Never    Smokeless tobacco: Never   Vaping Use    Vaping Use: Never used   Substance and Sexual Activity    Alcohol use: No    Drug use: No    Sexual activity: Not on file   Other Topics Concern    Not on file   Social History Narrative    Not on file     Social Determinants of Health     Financial Resource Strain: Not on file   Food Insecurity: Not on file   Transportation Needs: Not on file   Physical Activity: Not on file   Stress: Not on file   Social Connections: Not on file   Intimate Partner Violence: Not on file   Housing Stability: Not on file       Allergies as of 02/22/2024 - Reviewed 02/22/2024   Allergen Reaction Noted    Latex Rash 04/20/2017    Tape Rash 05/25/2023          Current medications as of today   Current Outpatient Medications   Medication Sig Dispense Refill    Semaglutide, 1 MG/DOSE, (OZEMPIC, 1 MG/DOSE,) 4 MG/3ML Solution Pen-injector Inject 1 mg under the skin every 7 days. 3 mL 5    Semaglutide, 1 MG/DOSE, (OZEMPIC, 1 MG/DOSE,) 4 MG/3ML Solution Pen-injector Inject 1 mg under the skin every 7 days. 3 mL 5    chlorthalidone (HYGROTON) 25 MG Tab Take 1 Tablet by mouth every evening. 90 Tablet 3    levothyroxine (SYNTHROID) 112 MCG Tab Take 1 Tablet by mouth every morning on an empty stomach. 90 Tablet 4    Probiotic Product (PROBIOTIC PO) Take  by mouth every day.      Zinc 22.5 MG Tab Take  by mouth every day.      potassium chloride SA (KDUR) 20 MEQ Tab CR Take 2 Tablets by mouth 2 times a day. 360 Tablet 2    omega-3 acid ethyl  esters (LOVAZA) 1 GM capsule Take 2 Capsules by mouth 2 times a day. 360 Capsule 3    atorvastatin (LIPITOR) 40 MG Tab Take 1 Tablet by mouth every evening. 90 Tablet 3    valsartan (DIOVAN) 320 MG tablet take 1 tablet by mouth once daily (Patient taking differently: every evening.) 90 Tablet 3    Ascorbic Acid (VITAMIN C CR) 1500 MG Tab CR every day.      Colostrum 500 MG Cap every day.      traMADol (ULTRAM) 50 MG Tab Take 50 mg by mouth every 6 hours as needed.       No current facility-administered medications for this visit.         Physical Exam:   Gen:           Alert and oriented, No apparent distress. Mood and affect appropriate, normal interaction with examiner.   Hearing:     Grossly intact.  Nose:          Normal, no lesions or deformities.  Dentition:    Good dentition.   Oropharynx:   Tongue normal, posterior pharynx without erythema or exudate.  Mallampati Classification: II  Neck:        Supple, trachea midline, no masses.  Respiratory Effort: No intercostal retractions or use of accessory muscles.   Gait and Station: Normal.  Digits and Nails: No clubbing, cyanosis, petechiae, or nodes.   Skin:        No rashes, lesions or ulcers noted.               Ext:           No cyanosis or edema.      Immunizations:  Flu: Recommended  SARS CoV2 Vaccine: Recommended    Assessment / Plan:  1. MADDI (obstructive sleep apnea)  - DME Mask and Supplies  - DME Other    Reviewed compliance, meeting insurance requirements and yearly therapeutic goals.  She will continue to work on achieving 6-6-1/2 hours daily use.  Order placed for mask and supplies as well as ST card.  We did review equipment cleaning as well this equipment replacement schedule.  Patient reminded to use distilled water only in humidifier chamber.  Follow-up annually sooner if needed.    2. Obesity (BMI 30.0-34.9)    Elevated BMI: Discussion regarding impact central adiposity on pulmonary function.  Encouraged to increase activity as tolerated and  monitor nutritional intake.  Patient is on Ozempic and experiencing some headaches but has lost a reported 50 pounds.      Follow-up:   Return in about 1 year (around 2/22/2025) for Return with Xochitl Gonzales PA-C.    Please note that this dictation was created using voice recognition software. I have made every reasonable attempt to correct obvious errors, but it is possible there are errors of grammar and possibly content that I did not discover before finalizing the note.

## 2024-02-22 NOTE — PROCEDURE: BIOPSY BY SHAVE METHOD
Anesthesia Post Evaluation    Patient: Michelle Hodges    Procedure(s) Performed: Procedure(s) (LRB):  REPLACEMENT, AORTIC VALVE, WITH REPEAT STERNOTOMY (N/A)    Final Anesthesia Type: general    Patient location during evaluation: ICU  Patient participation: Yes- Able to Participate  Level of consciousness: awake  Post-procedure vital signs: reviewed and stable  Pain management: adequate  Airway patency: patent    PONV status at discharge: No PONV  Anesthetic complications: no      Cardiovascular status: hemodynamically stable (requiring low dose pressor support)  Respiratory status: nasal cannula  Follow-up not needed.          Vitals Value Taken Time   BP 98/53 3/11/2020 10:02 AM   Temp 37 °C (98.6 °F) 3/11/2020  8:00 AM   Pulse 93 3/11/2020 11:01 AM   Resp 29 3/11/2020 11:01 AM   SpO2 93 % 3/11/2020 11:01 AM   Vitals shown include unvalidated device data.      No case tracking events are documented in the log.      Pain/Kayla Score: Pain Rating Prior to Med Admin: 10 (3/11/2020 10:51 AM)  Pain Rating Post Med Admin: 10 (3/10/2020 10:23 PM)        
Detail Level: Detailed
Depth Of Biopsy: dermis
Was A Bandage Applied: Yes
Size Of Lesion In Cm: 1.8
X Size Of Lesion In Cm: 0
Biopsy Type: H and E
Biopsy Method: Personna blade
Anesthesia Type: 0.5% lidocaine without epinephrine
Anesthesia Volume In Cc: 0.5
Hemostasis: Drysol
Wound Care: Petrolatum
Dressing: bandage
Destruction After The Procedure: No
Type Of Destruction Used: Electrodesiccation and Curettage
Curettage Text: The wound bed was treated with curettage after the biopsy was performed.
Cryotherapy Text: The wound bed was treated with cryotherapy after the biopsy was performed.
Electrodesiccation Text: The wound bed was treated with electrodesiccation after the biopsy was performed.
Electrodesiccation And Curettage Text: The wound bed was treated with electrodesiccation and curettage after the biopsy was performed. Treated x 3
Silver Nitrate Text: The wound bed was treated with silver nitrate after the biopsy was performed.
Lab: [id number]
Lab Facility: 
Consent: Written consent was obtained and risks were reviewed including but not limited to scarring, infection, bleeding, scabbing, incomplete removal, nerve damage and allergy to anesthesia.
Post-Care Instructions: I reviewed with the patient in detail post-care instructions. Patient is to keep the biopsy site dry overnight, and then apply bacitracin twice daily until healed. Patient may apply hydrogen peroxide soaks to remove any crusting.
Notification Instructions: Patient will be notified of biopsy results. However, patient instructed to call the office if not contacted within 2 weeks.
Billing Type: Third-Party Bill
Information: Selecting Yes will display possible errors in your note based on the variables you have selected. This validation is only offered as a suggestion for you. PLEASE NOTE THAT THE VALIDATION TEXT WILL BE REMOVED WHEN YOU FINALIZE YOUR NOTE. IF YOU WANT TO FAX A PRELIMINARY NOTE YOU WILL NEED TO TOGGLE THIS TO 'NO' IF YOU DO NOT WANT IT IN YOUR FAXED NOTE.

## 2024-02-22 NOTE — PATIENT INSTRUCTIONS
1-reviewed compliance which is meeting insurance requirements and nearly therapeutic goals  2-will work on this   3-order for SD card as well as mask and supplies placed  4-Today we reviewed equipment cleaning  once weekly minimum  mask, tubing and water chamber  use dedicated container  use mild soap and water  SoClean or other ozone  are not recommended  white vinegar and water solution is no longer recommended  hang tubing to dry  mask sanitizing wipes are an option for use   5-As a reminder use distilled water only in humidifier chamber.    6-Equipment replacement schedule : Nasal pillows 2 times per month, Head gear every 6 months, Tubing every 3 months, Ultra-fine filters 2 times per month, Foam filter every 6 months, Humidifier chamber every 6 months   7-follow up in one year, sooner if needed

## 2024-02-22 NOTE — PROCEDURE: LIQUID NITROGEN
Spray Paint Technique: No
Medical Necessity Clause: This procedure was medically necessary because the lesions that were treated were:
Show Applicator Variable?: Yes
Post-Care Instructions: I reviewed with the patient in detail post-care instructions. Patient is to wear sunprotection, and avoid picking at any of the treated lesions. Pt may apply Vaseline to crusted or scabbing areas.
Detail Level: Detailed
Medical Necessity Information: It is in your best interest to select a reason for this procedure from the list below. All of these items fulfill various CMS LCD requirements except the new and changing color options.
Spray Paint Text: The liquid nitrogen was applied to the skin utilizing a spray paint frosting technique.
Consent: The patient's consent was obtained including but not limited to risks of crusting, scabbing, blistering, scarring, darker or lighter pigmentary change, recurrence, incomplete removal and infection.

## 2024-02-26 DIAGNOSIS — I10 ESSENTIAL HYPERTENSION: ICD-10-CM

## 2024-02-26 DIAGNOSIS — I16.0 HYPERTENSIVE URGENCY: ICD-10-CM

## 2024-02-27 RX ORDER — VALSARTAN 320 MG/1
TABLET ORAL
Qty: 30 TABLET | Refills: 0 | Status: SHIPPED | OUTPATIENT
Start: 2024-02-27 | End: 2024-03-25 | Stop reason: SDUPTHER

## 2024-02-27 NOTE — TELEPHONE ENCOUNTER
Is the patient due for a refill? Yes    Was the patient seen the past year? Yes    Date of last office visit: 8/10/23    Does the patient have an upcoming appointment?  Yes   If yes, When? 3/25/24    Provider to refill:BE (ADD)    Does the patients insurance require a 100 day supply?  No

## 2024-03-07 PROCEDURE — RXMED WILLOW AMBULATORY MEDICATION CHARGE: Performed by: INTERNAL MEDICINE

## 2024-03-11 ENCOUNTER — PHARMACY VISIT (OUTPATIENT)
Dept: PHARMACY | Facility: MEDICAL CENTER | Age: 64
End: 2024-03-11
Payer: COMMERCIAL

## 2024-03-12 ENCOUNTER — TELEPHONE (OUTPATIENT)
Dept: PHARMACY | Facility: MEDICAL CENTER | Age: 64
End: 2024-03-12
Payer: COMMERCIAL

## 2024-03-12 NOTE — TELEPHONE ENCOUNTER
Contact:  [phone]     Nazia Lyle     Phone number: [phone]    Name of person spoken with and relationship to patient: Nazia, self   Patient’s Adherence:            How patient is doing on medication:  well    How many missed doses and reason: 0    Any new medications: no    Any new conditions: no    Any new allergies: no    Any new side effects: no     Any new diagnoses: no     How many doses remainin     Did patient want to speak with pharmacist: no  Delivery:            Delivery date and method:  patient already picked up on 3/11. Adherence questions completed    Needs by Date:  na    Signature required:  no    Any additional details for :  na  Teach Appointment Date:  na  Shipping Address:  [address] [place] [id number]  Medication(name, strength and dose):  Ozempic (1 MG/DOSE) 4 MG/3ML Sopn SubQ Inject every 7 days  Copay: $391.04  Payment Method: ccof  Supplies:  na  Additional info:  HUSSAIN Mandujano. She stated doing well on the medication. She stated since living in [place] if she happens to be in [place] she will try to get right then so she was a little early on this refill, but has to time it . So far has not missed any doses and has been able to  on time or slightly early. She stated due to the weight loss, her BP medications have been reduced and one stopped altogether.  may be adjusting on next f/u as needed. No further questions/concerns at this time

## 2024-03-20 NOTE — PROGRESS NOTES
No chief complaint on file.         Subjective:   Nazia Lyle is a 64 y.o. female who presents today for follow-up.     Patient of Dr. Herman.  Pertinent medical problems include hypertension, dyslipidemia, TELLES, obesity. Their last clinic visit was August 20, 2023 with Dr Herman.    Weight loss on Wegovy      Past Medical History:   Diagnosis Date    Anesthesia     PONV    Arthritis     osteoarthritis, hands    Bowel habit changes     IBD,, occasional diarrhea    Chickenpox     Dyslipidemia 09/17/2012    Fatty liver     [place] measles     as a child    Heart murmur     High cholesterol     History of kidney stones     HTN (hypertension) 09/17/2012    Hypothyroid     IBD (inflammatory bowel disease)     Obesity 09/17/2012    PONV (postoperative nausea and vomiting)     Pre-diabetes     Sleep apnea     CPAP         Family History   Problem Relation Age of Onset    Thyroid Mother     Diabetes Father     Hypertension Father     Diabetes Maternal Grandmother     Diabetes Maternal Grandfather     Heart Disease Paternal Grandmother     Cancer Paternal Grandfather          Social History     Tobacco Use    Smoking status: Never    Smokeless tobacco: Never   Vaping Use    Vaping Use: Never used   Substance Use Topics    Alcohol use: No    Drug use: No         Allergies   Allergen Reactions    Latex Rash     rash    Tape Rash     .         Current Outpatient Medications   Medication Sig    valsartan (DIOVAN) 320 MG tablet Take 1 tablet by mouth once daily    Semaglutide, 1 MG/DOSE, (OZEMPIC, 1 MG/DOSE,) 4 MG/3ML Solution Pen-injector Inject 1 mg under the skin every 7 days.    Semaglutide, 1 MG/DOSE, (OZEMPIC, 1 MG/DOSE,) 4 MG/3ML Solution Pen-injector Inject 1 mg under the skin every 7 days.    chlorthalidone (HYGROTON) 25 MG Tab Take 1 Tablet by mouth every evening.    levothyroxine (SYNTHROID) 112 MCG Tab Take 1 Tablet by mouth every morning on an empty stomach.    Probiotic Product (PROBIOTIC PO) Take  by mouth  every day.    Zinc 22.5 MG Tab Take  by mouth every day.    potassium chloride SA (KDUR) 20 MEQ Tab CR Take 2 Tablets by mouth 2 times a day.    omega-3 acid ethyl esters (LOVAZA) 1 GM capsule Take 2 Capsules by mouth 2 times a day.    atorvastatin (LIPITOR) 40 MG Tab Take 1 Tablet by mouth every evening.    Ascorbic Acid (VITAMIN C CR) 1500 MG Tab CR every day.    Colostrum 500 MG Cap every day.         ROS       Objective:   There were no vitals taken for this visit. There is no height or weight on file to calculate BMI.         Physical Exam       Assessment:   No diagnosis found.     Medical Decision Making:  Today's Assessment / Plan:   Patient doing excellent from a cardiac perspective.  Blood pressure at goal.  We discussed monitoring signs and symptoms of hypotension.  Continue current antihypertensive therapy without any changes.  We discussed that with further weight loss we may lower medications for BP.  Would recommend discontinuing amlodipine if she is experiencing symptomatic hypotension.     For dyslipidemia, continue Lipitor 40 mg daily.       For obesity and prediabetes, continue Ozempic.     All of patient's excellent questions were answered to the best of my knowledge and to her satisfaction.  It was a pleasure seeing Ms. Nazia Lyle in my clinic today. Return in about 1 year (around 8/10/2024). Patient is aware to call the cardiology clinic with any questions or concerns.    No follow-ups on file.  Sooner if problems.

## 2024-03-25 ENCOUNTER — OFFICE VISIT (OUTPATIENT)
Dept: CARDIOLOGY | Facility: MEDICAL CENTER | Age: 64
End: 2024-03-25
Attending: PHYSICIAN ASSISTANT
Payer: COMMERCIAL

## 2024-03-25 VITALS
SYSTOLIC BLOOD PRESSURE: 127 MMHG | OXYGEN SATURATION: 95 % | RESPIRATION RATE: 16 BRPM | HEIGHT: 68 IN | WEIGHT: 232 LBS | HEART RATE: 84 BPM | BODY MASS INDEX: 35.16 KG/M2 | DIASTOLIC BLOOD PRESSURE: 65 MMHG

## 2024-03-25 DIAGNOSIS — Z82.49 FAMILY HISTORY OF CORONARY ARTERY DISEASE: ICD-10-CM

## 2024-03-25 DIAGNOSIS — E66.01 CLASS 2 SEVERE OBESITY WITH SERIOUS COMORBIDITY AND BODY MASS INDEX (BMI) OF 35.0 TO 35.9 IN ADULT, UNSPECIFIED OBESITY TYPE (HCC): ICD-10-CM

## 2024-03-25 DIAGNOSIS — I10 ESSENTIAL HYPERTENSION: ICD-10-CM

## 2024-03-25 DIAGNOSIS — E78.5 DYSLIPIDEMIA: ICD-10-CM

## 2024-03-25 DIAGNOSIS — R73.03 PREDIABETES: ICD-10-CM

## 2024-03-25 PROCEDURE — 3074F SYST BP LT 130 MM HG: CPT

## 2024-03-25 PROCEDURE — 99214 OFFICE O/P EST MOD 30 MIN: CPT

## 2024-03-25 PROCEDURE — 99213 OFFICE O/P EST LOW 20 MIN: CPT | Performed by: PHYSICIAN ASSISTANT

## 2024-03-25 PROCEDURE — 3078F DIAST BP <80 MM HG: CPT

## 2024-03-25 RX ORDER — ATORVASTATIN CALCIUM 40 MG/1
40 TABLET, FILM COATED ORAL EVERY EVENING
Qty: 90 TABLET | Refills: 3 | Status: SHIPPED | OUTPATIENT
Start: 2024-03-25

## 2024-03-25 RX ORDER — VALSARTAN 320 MG/1
320 TABLET ORAL DAILY
Qty: 90 TABLET | Refills: 3 | Status: SHIPPED | OUTPATIENT
Start: 2024-03-25

## 2024-03-25 ASSESSMENT — ENCOUNTER SYMPTOMS
DIZZINESS: 0
PALPITATIONS: 0
SHORTNESS OF BREATH: 0

## 2024-03-25 ASSESSMENT — FIBROSIS 4 INDEX: FIB4 SCORE: 0.87

## 2024-03-25 NOTE — PROGRESS NOTES
Chief Complaint   Patient presents with    Hypercholesterolemia Follow-up    Hypertension    Hyperlipidemia          Subjective:   Nazia Lyle is a 64 y.o. female who presents today for follow-up.     Patient of Dr. Herman.  Pertinent medical problems include hypertension, dyslipidemia, TELLES, obesity. Their last clinic visit was August 20, 2023 with Dr Herman  via telemedicine.    Since her last visit she reports doing well from cardiac standpoint. Blood pressure controlled. Patient reports taking blood pressure at home, 120s/60s but did not bring log to clinic. Patient reports episodes of lightheadedness occasionally in the morning but episodes have decreased since stopping amlodipine August 2023. Episodes worse when she doesn't drink enough water or forgets to take her potassium. Patient reports taking medications as prescribed. Denies chest pain, pressure, palpitations or shortness of breath.     Patient with 43 lb weight loss since starting Wegovy in April 2023. Patient denies nausea or side effects. Being followed by [facility]. Patient reports increased activity and exercise, walking outside and elliptical machine 1-2x per week. Working on increasing exercise.HbA1c 5.3 7/28/2023.    Past Medical History:   Diagnosis Date    Anesthesia     PONV    Arthritis     osteoarthritis, hands    Bowel habit changes     IBD,, occasional diarrhea    Chickenpox     Dyslipidemia 09/17/2012    Fatty liver     [place] measles     as a child    Heart murmur     High cholesterol     History of kidney stones     HTN (hypertension) 09/17/2012    Hypothyroid     IBD (inflammatory bowel disease)     Obesity 09/17/2012    PONV (postoperative nausea and vomiting)     Pre-diabetes     Sleep apnea     CPAP         Family History   Problem Relation Age of Onset    Thyroid Mother     Diabetes Father     Hypertension Father     Diabetes Maternal Grandmother     Diabetes Maternal Grandfather     Heart Disease Paternal Grandmother  "    Cancer Paternal Grandfather          Social History     Tobacco Use    Smoking status: Never    Smokeless tobacco: Never   Vaping Use    Vaping Use: Never used   Substance Use Topics    Alcohol use: No    Drug use: No         Allergies   Allergen Reactions    Latex Rash     rash    Tape Rash     .         Current Outpatient Medications   Medication Sig    atorvastatin (LIPITOR) 40 MG Tab Take 1 Tablet by mouth every evening.    valsartan (DIOVAN) 320 MG tablet Take 1 Tablet by mouth every day.    Semaglutide, 1 MG/DOSE, (OZEMPIC, 1 MG/DOSE,) 4 MG/3ML Solution Pen-injector Inject 1 mg under the skin every 7 days.    Semaglutide, 1 MG/DOSE, (OZEMPIC, 1 MG/DOSE,) 4 MG/3ML Solution Pen-injector Inject 1 mg under the skin every 7 days.    chlorthalidone (HYGROTON) 25 MG Tab Take 1 Tablet by mouth every evening.    levothyroxine (SYNTHROID) 112 MCG Tab Take 1 Tablet by mouth every morning on an empty stomach.    Probiotic Product (PROBIOTIC PO) Take  by mouth every day.    Zinc 22.5 MG Tab Take  by mouth every day.    potassium chloride SA (KDUR) 20 MEQ Tab CR Take 2 Tablets by mouth 2 times a day.    omega-3 acid ethyl esters (LOVAZA) 1 GM capsule Take 2 Capsules by mouth 2 times a day.    Ascorbic Acid (VITAMIN C CR) 1500 MG Tab CR every day.    Colostrum 500 MG Cap every day.         Review of Systems   Constitutional:  Negative for malaise/fatigue.   Respiratory:  Negative for shortness of breath.    Cardiovascular:  Negative for chest pain, palpitations and leg swelling.   Neurological:  Negative for dizziness.          Objective:   /65 (BP Location: Left arm, Patient Position: Sitting, BP Cuff Size: Adult)   Pulse 84   Resp 16   Ht 1.727 m (5' 8\")   Wt 105 kg (232 lb)   SpO2 95%  Body mass index is 35.28 kg/m².         Physical Exam  HENT:      Head: Normocephalic and atraumatic.   Cardiovascular:      Rate and Rhythm: Normal rate and regular rhythm.      Heart sounds: No murmur heard.  Pulmonary:     "  Effort: Pulmonary effort is normal. No respiratory distress.      Breath sounds: Normal breath sounds.   Musculoskeletal:      Right lower leg: No edema.      Left lower leg: No edema.   Skin:     General: Skin is warm and dry.   Neurological:      General: No focal deficit present.      Gait: Gait normal.            Assessment:     1. Essential hypertension  valsartan (DIOVAN) 320 MG tablet    Comp Metabolic Panel    MAGNESIUM      2. Dyslipidemia  atorvastatin (LIPITOR) 40 MG Tab    Lipid Profile    Comp Metabolic Panel      3. Prediabetes        4. Family history of coronary artery disease        5. Class 2 severe obesity with serious comorbidity and body mass index (BMI) of 35.0 to 35.9 in adult, unspecified obesity type (HCC)             Medical Decision Making:  Today's Assessment / Plan:        Dyslipidemia/ Family Hx of CAD  -continue Lipitor 40 mg daily.    -Calculated ASCVD risk 10% 3/2024  -Continue to increase activity for a healthy lifestyle and diet modifications  -LDL 91 (2023) controlled goal LDL < 100  --Lipid panel ordered    Hypertension, controlled  -Continue valsartan 320 mg  -Continue chlorthalidone 25 mg  -Continue Potassium 40 meq BID. Follow up on dosage pending CMP results  -CMP and Mag ordered  -Continue to monitor for signs of hypotension and take BP at home    Obesity with BMI 35.0-35.9/Prediabetes  -Continue to follow with pharmacotherapy for weight loss management and Ozempic    It was a pleasure seeing Ms. Nazia Lyle in clinic today. Return for follow up in 6 months or sooner if needed. Patient is aware to call the cardiology clinic with any questions or concerns.

## 2024-04-03 PROCEDURE — RXMED WILLOW AMBULATORY MEDICATION CHARGE: Performed by: NURSE PRACTITIONER

## 2024-04-04 ENCOUNTER — APPOINTMENT (RX ONLY)
Dept: URBAN - METROPOLITAN AREA CLINIC 36 | Facility: CLINIC | Age: 64
Setting detail: DERMATOLOGY
End: 2024-04-04

## 2024-04-04 DIAGNOSIS — Z41.9 ENCOUNTER FOR PROCEDURE FOR PURPOSES OTHER THAN REMEDYING HEALTH STATE, UNSPECIFIED: ICD-10-CM

## 2024-04-04 PROCEDURE — ? KYBELLA INJECTION

## 2024-04-04 PROCEDURE — ? BOTOX

## 2024-04-04 NOTE — PROCEDURE: KYBELLA INJECTION
Number Of Grid Dots (Won't Render If 0): 0
Procedure Text: The treatment areas were cleansed. Kybella was administered using the parameters mentioned above.
Expiration Date (Month Year): 09/2024
Detail Level: Detailed
Treatment Area: submental chin
Anesthesia Volume In Cc: 1
Post-Care Instructions: Patient instructed to apply ice to reduce swelling.
Lot #: [id number]
Price (Use Numbers Only, No Special Characters Or $): 0.00
Kybella Volume In Cc (Won't Render If 0): 3
Consent: Written consent obtained. Risks include but not limited to bruising, beading, irregular texture, ulceration, infection, allergic reaction, scar formation, incomplete augmentation, temporary nature, procedural pain.
Treatment Number (Won't Render If 0): 2
Anesthesia Type: 1% lidocaine with 1:200,000 epinephrine and a 1:10 solution of 8.4% sodium bicarbonate

## 2024-04-04 NOTE — PROCEDURE: BOTOX
Left Periorbital Skin Units: 0
Show Forehead Units: Yes
Expiration Date (Month Year): 09/2026
Show Ucl Units: No
Forehead Units: 10
Lot #: [id number]
Glabellar Complex Units: 14
Additional Area 2 Location: chin
Additional Area 1 Location: upper lip
Consent: Written consent obtained. Risks include but not limited to lid/brow ptosis, bruising, swelling, diplopia, temporary effect, incomplete chemical denervation.
Depressor Anguli Oris Units: 6
Incrementing Botox Units: By 0.5 Units
Post-Care Instructions: Patient instructed to not lie down for 4 hours and limit physical activity for 24 hours. Patient instructed not to travel by airplane for 48 hours.
Dilution (U/0.1 Cc): 0.1
Detail Level: Detailed
Periorbital Skin Units: 16
Additional Area 3 Location: masseter

## 2024-04-08 ENCOUNTER — NON-PROVIDER VISIT (OUTPATIENT)
Dept: CARDIOLOGY | Facility: MEDICAL CENTER | Age: 64
End: 2024-04-08
Attending: PHYSICIAN ASSISTANT
Payer: COMMERCIAL

## 2024-04-08 ENCOUNTER — PHARMACY VISIT (OUTPATIENT)
Dept: PHARMACY | Facility: MEDICAL CENTER | Age: 64
End: 2024-04-08
Payer: COMMERCIAL

## 2024-04-08 VITALS
WEIGHT: 232.4 LBS | HEART RATE: 82 BPM | SYSTOLIC BLOOD PRESSURE: 132 MMHG | BODY MASS INDEX: 35.34 KG/M2 | DIASTOLIC BLOOD PRESSURE: 80 MMHG

## 2024-04-08 PROCEDURE — 99212 OFFICE O/P EST SF 10 MIN: CPT

## 2024-04-08 ASSESSMENT — FIBROSIS 4 INDEX: FIB4 SCORE: 0.87

## 2024-04-08 NOTE — PROGRESS NOTES
Patient Consult Note    Primary care physician: Tee Cedeño M.D.    Reason for consult: Obesity/Weight Management    HPI:  Nazia Lyle is a 63 y.o. old patient who comes in today for evaluation of above stated problem.    Initial Weight: 279 lbs  Initial BMI: 42.42 kg/m2    Current Weight: 232.4 lbs  Current BMI: 35.34 kg/m2    Most Recent HbA1c:   Lab Results   Component Value Date/Time    HBA1C 5.6 06/07/2023 10:01 AM      Lab Results   Component Value Date/Time    CREATININE 0.55 06/07/2023 10:01 AM        Obesity Medication History and Current Regimen  GLP-1 Agent: Ozempic 1 mg subQ once weekly    Previous Obesity Medications:  Phentermine and metformin - Pt states that metformin was only Dc'd d/t being lost to f/u w/ other wt management clinic. Pt w/ hx of IBD - can only tolerate metformin 500 mg 1/2 tab BID.    Lifestyle:  -Current Exercise - Walking 2x/week for 30 min. Plans to increase activity level as the weather gets warmer.  -Exercise Goal - Increase as tolerated to goal of 150 min/week.     -Dietary: Continues to note appetite suppression. Focusing on DASH Diet & more protein. Eating smaller portions more often then.  Breakfast - Protein bar, egg whites, almonds, cottage cheese  Lunch - Salad, tuna, protein drinks  Dinner - Lean protein and vegetable  Snack - Doesn't typically snack  Dessert - Avoids as much as she can. Will indulge w/ fruit if she craves sweets. Occasional pastries.  Beverage - Water, coffee w/ half/half & Stevia but working on reducing this     Preventative Management  BP regimen (ACE/ARB) - Valsartan 320 mg once daily  ASA - None  Statin - Atorvastatin 40 mg once daily  Last A1c -   Lab Results   Component Value Date/Time    HBA1C 5.6 06/07/2023 10:01 AM     Past Medical History:  Patient Active Problem List    Diagnosis Date Noted    Prediabetes 10/04/2021    Hypercholesterolemia 03/31/2021    Metabolic syndrome 08/19/2020    Weight gain 04/20/2020    Inflammatory  bowel disease 04/20/2020    LVH (left ventricular hypertrophy) 04/20/2020    Family history of coronary artery disease 03/17/2020    Vitamin D deficiency 11/22/2019    Hypothyroidism 08/08/2014    Elevated fasting blood sugar 08/08/2014    Hot flash, menopausal 08/08/2014    Morbid obesity with BMI of 40.0-44.9, adult (HCC) 08/08/2014    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    Essential hypertension 09/17/2012    Mixed hyperlipidemia 09/17/2012     Past Surgical History:  Past Surgical History:   Procedure Laterality Date    FUSION AND ARTHROEREISIS, JOINT, FOOT AND ANKLE Left 6/22/2023    Procedure: LEFT ARTHRODESIS TARSOMETATARSAL MIDFOOT FUSION SINGLE JOINT WITH NEUROLYSIS OF DEEP PERONEAL NERVE;  Surgeon: Wm Jose Ro M.D.;  Location: SURGERY [facility];  Service: Orthopedics    HARDWARE REMOVAL UPPER EXTREMITY Left 08/11/2015    Procedure: HARDWARE REMOVAL UPPER EXTREMITY;  Surgeon: Chaka Mckeon M.D.;  Location: SURGERY [facility];  Service:     HAND SURGERY Right 2014    middle finger    APPENDECTOMY      CHOLECYSTECTOMY      PRIMARY C SECTION      TONSILLECTOMY       Allergies:  Latex and Tape    Social History:  Social History     Socioeconomic History    Marital status:      Spouse name: Not on file    Number of children: Not on file    Years of education: Not on file    Highest education level: Not on file   Occupational History    Not on file   Tobacco Use    Smoking status: Never    Smokeless tobacco: Never   Vaping Use    Vaping Use: Never used   Substance and Sexual Activity    Alcohol use: No    Drug use: No    Sexual activity: Not on file   Other Topics Concern    Not on file   Social History Narrative    Not on file     Social Determinants of Health     Financial Resource Strain: Not on file   Food Insecurity: Not on file   Transportation Needs: Not on file   Physical Activity: Not on file   Stress: Not on file   Social Connections: Not on file   Intimate Partner Violence:  Not on file   Housing Stability: Not on file     Family History:  Family History   Problem Relation Age of Onset    Thyroid Mother     Diabetes Father     Hypertension Father     Diabetes Maternal Grandmother     Diabetes Maternal Grandfather     Heart Disease Paternal Grandmother     Cancer Paternal Grandfather        Medications:    Current Outpatient Medications:     atorvastatin (LIPITOR) 40 MG Tab, Take 1 Tablet by mouth every evening., Disp: 90 Tablet, Rfl: 3    valsartan (DIOVAN) 320 MG tablet, Take 1 Tablet by mouth every day., Disp: 90 Tablet, Rfl: 3    Semaglutide, 1 MG/DOSE, (OZEMPIC, 1 MG/DOSE,) 4 MG/3ML Solution Pen-injector, Inject 1 mg under the skin every 7 days., Disp: 3 mL, Rfl: 5    Semaglutide, 1 MG/DOSE, (OZEMPIC, 1 MG/DOSE,) 4 MG/3ML Solution Pen-injector, Inject 1 mg under the skin every 7 days., Disp: 3 mL, Rfl: 5    chlorthalidone (HYGROTON) 25 MG Tab, Take 1 Tablet by mouth every evening., Disp: 90 Tablet, Rfl: 3    levothyroxine (SYNTHROID) 112 MCG Tab, Take 1 Tablet by mouth every morning on an empty stomach., Disp: 90 Tablet, Rfl: 4    Probiotic Product (PROBIOTIC PO), Take  by mouth every day., Disp: , Rfl:     Zinc 22.5 MG Tab, Take  by mouth every day., Disp: , Rfl:     potassium chloride SA (KDUR) 20 MEQ Tab CR, Take 2 Tablets by mouth 2 times a day., Disp: 360 Tablet, Rfl: 2    omega-3 acid ethyl esters (LOVAZA) 1 GM capsule, Take 2 Capsules by mouth 2 times a day., Disp: 360 Capsule, Rfl: 3    Ascorbic Acid (VITAMIN C CR) 1500 MG Tab CR, every day., Disp: , Rfl:     Colostrum 500 MG Cap, every day., Disp: , Rfl:     Labs: Reviewed    Physical Examination:  Vital signs: /80   Pulse 82   Wt 105 kg (232 lb 6.4 oz)   BMI 35.34 kg/m²  Body mass index is 35.34 kg/m².    Assessment and Plan:    1. Obesity/Weight Management  Pt presents to clinic doing well since last visit  Pt admits to doing less physical activity lately w/ the winter weather - encouraged to begin using her  indoor cardio equipment.  Ozempic is affordable to her at this time via [facility] program.  She is tolerating current dose of Ozempic and continues to note weight loss and appetite suppression. Will continue current dose at this time per shared decision making.    - Medication changes:  Continue Ozempic 1 mg subQ once weekly    - Lifestyle changes:  Diet: Maximize lean proteins and veggies. Cut out/down on carbs. Avoid simple sugars.   Exercise: Increase as tolerated    FU: 3 months    Karolyn Prado, PharmD, BCACP    CC:   Tee Cedeño M.D.

## 2024-04-09 LAB
CHOLEST SERPL-MCNC: 154 MG/DL
HDLC SERPL-MCNC: 25 MG/DL
LDLC SERPL CALC-MCNC: 94 MG/DL
TRIGL SERPL-MCNC: 264 MG/DL

## 2024-04-18 ENCOUNTER — OFFICE VISIT (OUTPATIENT)
Dept: ENDOCRINOLOGY | Facility: MEDICAL CENTER | Age: 64
End: 2024-04-18
Payer: COMMERCIAL

## 2024-04-18 VITALS
SYSTOLIC BLOOD PRESSURE: 108 MMHG | BODY MASS INDEX: 34.86 KG/M2 | DIASTOLIC BLOOD PRESSURE: 74 MMHG | RESPIRATION RATE: 18 BRPM | HEART RATE: 90 BPM | WEIGHT: 230 LBS | OXYGEN SATURATION: 97 % | HEIGHT: 68 IN

## 2024-04-18 DIAGNOSIS — E06.3 HASHIMOTO'S DISEASE: ICD-10-CM

## 2024-04-18 DIAGNOSIS — R73.03 PREDIABETES: ICD-10-CM

## 2024-04-18 DIAGNOSIS — E78.2 MIXED HYPERLIPIDEMIA: ICD-10-CM

## 2024-04-18 DIAGNOSIS — E03.9 HYPOTHYROIDISM, ACQUIRED: ICD-10-CM

## 2024-04-18 DIAGNOSIS — E55.9 VITAMIN D DEFICIENCY: ICD-10-CM

## 2024-04-18 PROCEDURE — 3074F SYST BP LT 130 MM HG: CPT

## 2024-04-18 PROCEDURE — 99214 OFFICE O/P EST MOD 30 MIN: CPT

## 2024-04-18 PROCEDURE — 3078F DIAST BP <80 MM HG: CPT

## 2024-04-18 PROCEDURE — 99211 OFF/OP EST MAY X REQ PHY/QHP: CPT

## 2024-04-18 RX ORDER — LEVOTHYROXINE SODIUM 112 UG/1
112 TABLET ORAL
Qty: 90 TABLET | Refills: 0 | Status: SHIPPED | OUTPATIENT
Start: 2024-04-18

## 2024-04-18 ASSESSMENT — FIBROSIS 4 INDEX: FIB4 SCORE: 0.87

## 2024-04-18 NOTE — PROGRESS NOTES
Chief Complaint: Consult requested by Tee Cedeño M.D. for evaluation of the following conditions  HPI:   Nazia Lyle is a 63 y.o. female     Hypothyroidism, acquired:  Diagnosed many years ago.     She is currently on Synthroid 112 mcg daily    She denies Good compliance and takes her thyroid hormone daily before breakfast.    She denies taking any iron, calcium supplements or antacids.  Denies multivitamins  Denies taking biotin       She denies weight gain, palpitations, feeling cold and cold intolerance, constipation, swelling, feeling slow, losing hair, anxiousness, feeling excessive energy, tremulousness, sweating, and weight loss.      Pt did blood work but they did not send results   Blood work done at [facility] 7/28/2023  -TSH at 0.10 (0.47-4.90)  -Free T4 1.45 (0.47-4.90)       2.  Hashimoto's disease:  Presumable etiology of her hypothyroidism        3.  Prediabetes:  FV by pcp   POC A1c on 4/18/2023 at 5.8%  Ozempic 0.5 mg weekly     4.  Vitamin D deficiency:  Currently not taking any supplementation -    Blood work done at [facility] 7/28/2023  Vit D3 total at 43    5. Mixed hyperlipidemia:  Patient requesting courtesy prescription for her omega-3 before she sees her PCP    Patient's medications, allergies, and social histories were reviewed and updated as appropriate.      ROS:     CONS:     No fever, no chills, no weight loss, no fatigue   EYES:      No diplopia, no blurry vision, no redness of eyes, no swelling of eyelids   ENT:    No hearing loss, No ear pain, No sore throat, no dysphagia, no neck swelling   CV:     No chest pain, no palpitations, no claudication, no orthopnea, no PND   PULM:    No SOB, no cough, no hemoptysis, no wheezing    GI:   No nausea, no vomiting, no diarrhea, no constipation, no bloody stools   :  Passing urine well, no dysuria, no hematuria   ENDO:   No polyuria, no polydipsia, no heat intolerance, no cold intolerance   NEURO: No headaches, no dizziness, no  convulsions, no tremors   MUSC:  No joint swellings, no arthralgias, no myalgias, no weakness   SKIN:   No rash, no ulcers, no dry skin   PSYCH:   No depression, no anxiety, no difficulty sleeping       Past Medical History:  Patient Active Problem List    Diagnosis Date Noted    Prediabetes 10/04/2021    Hypercholesterolemia 03/31/2021    Metabolic syndrome 08/19/2020    Weight gain 04/20/2020    Inflammatory bowel disease 04/20/2020    LVH (left ventricular hypertrophy) 04/20/2020    Family history of coronary artery disease 03/17/2020    Vitamin D deficiency 11/22/2019    Hypothyroidism 08/08/2014    Elevated fasting blood sugar 08/08/2014    Hot flash, menopausal 08/08/2014    Morbid obesity with BMI of 40.0-44.9, adult (HCC) 08/08/2014    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    Essential hypertension 09/17/2012    Mixed hyperlipidemia 09/17/2012       Past Surgical History:  Past Surgical History:   Procedure Laterality Date    FUSION AND ARTHROEREISIS, JOINT, FOOT AND ANKLE Left 6/22/2023    Procedure: LEFT ARTHRODESIS TARSOMETATARSAL MIDFOOT FUSION SINGLE JOINT WITH NEUROLYSIS OF DEEP PERONEAL NERVE;  Surgeon: Wm Jose Ro M.D.;  Location: SURGERY [facility];  Service: Orthopedics    HARDWARE REMOVAL UPPER EXTREMITY Left 08/11/2015    Procedure: HARDWARE REMOVAL UPPER EXTREMITY;  Surgeon: Chaka Mckeon M.D.;  Location: SURGERY [facility];  Service:     HAND SURGERY Right 2014    middle finger    APPENDECTOMY      CHOLECYSTECTOMY      PRIMARY C SECTION      TONSILLECTOMY          Allergies:  Latex and Nkda [no known drug allergy]     Current Medications:    Current Outpatient Medications:     atorvastatin (LIPITOR) 40 MG Tab, Take 1 Tablet by mouth every evening., Disp: 90 Tablet, Rfl: 3    valsartan (DIOVAN) 320 MG tablet, Take 1 Tablet by mouth every day., Disp: 90 Tablet, Rfl: 3    Semaglutide, 1 MG/DOSE, (OZEMPIC, 1 MG/DOSE,) 4 MG/3ML Solution Pen-injector, Inject 1 mg under the skin  every 7 days., Disp: 3 mL, Rfl: 5    Semaglutide, 1 MG/DOSE, (OZEMPIC, 1 MG/DOSE,) 4 MG/3ML Solution Pen-injector, Inject 1 mg under the skin every 7 days., Disp: 3 mL, Rfl: 5    chlorthalidone (HYGROTON) 25 MG Tab, Take 1 Tablet by mouth every evening., Disp: 90 Tablet, Rfl: 3    levothyroxine (SYNTHROID) 112 MCG Tab, Take 1 Tablet by mouth every morning on an empty stomach., Disp: 90 Tablet, Rfl: 4    Probiotic Product (PROBIOTIC PO), Take  by mouth every day., Disp: , Rfl:     Zinc 22.5 MG Tab, Take  by mouth every day., Disp: , Rfl:     potassium chloride SA (KDUR) 20 MEQ Tab CR, Take 2 Tablets by mouth 2 times a day., Disp: 360 Tablet, Rfl: 2    omega-3 acid ethyl esters (LOVAZA) 1 GM capsule, Take 2 Capsules by mouth 2 times a day., Disp: 360 Capsule, Rfl: 3    Ascorbic Acid (VITAMIN C CR) 1500 MG Tab CR, every day., Disp: , Rfl:     Colostrum 500 MG Cap, every day., Disp: , Rfl:     Social History:  Social History     Socioeconomic History    Marital status:      Spouse name: Not on file    Number of children: Not on file    Years of education: Not on file    Highest education level: Not on file   Occupational History    Not on file   Tobacco Use    Smoking status: Never    Smokeless tobacco: Never   Vaping Use    Vaping Use: Never used   Substance and Sexual Activity    Alcohol use: No    Drug use: No    Sexual activity: Not on file   Other Topics Concern    Not on file   Social History Narrative    Not on file     Social Determinants of Health     Financial Resource Strain: Not on file   Food Insecurity: Not on file   Transportation Needs: Not on file   Physical Activity: Not on file   Stress: Not on file   Social Connections: Not on file   Intimate Partner Violence: Not on file   Housing Stability: Not on file        Family History:   Family History   Problem Relation Age of Onset    Thyroid Mother     Diabetes Father     Hypertension Father     Diabetes Maternal Grandmother     Diabetes Maternal  "Grandfather     Heart Disease Paternal Grandmother     Cancer Paternal Grandfather          PHYSICAL EXAM:   Vital signs: /74 (BP Location: Right arm, Patient Position: Sitting, BP Cuff Size: Adult)   Pulse 90   Resp 18   Ht 1.727 m (5' 8\")   Wt 104 kg (230 lb)   SpO2 97%   BMI 34.97 kg/m²   GENERAL: Well-developed, well-nourished  in no apparent distress.    SKIN: No rashes, lesions. Turgor is normal.    Labs:      Imaging:      ASSESSMENT/PLAN:   1. Hypothyroidism, acquired  Clinically stable   Biochemecally I did not receive patient's blood work results  Cont regimen see HPI   - TSH; Future  - FREE THYROXINE; Future  - Comp Metabolic Panel; Future  - levothyroxine (SYNTHROID) 112 MCG Tab; Take 1 Tablet by mouth every morning on an empty stomach.  Dispense: 90 Tablet; Refill: 0    2. Hashimoto's disease  This is etiology of her hypothyroidism    3. Prediabetes  Stable  Follow up PCP    4. Vitamin D deficiency  Stable  Continue regimen      5. Mixed hyperlipidemia  Stable  Follow up PCP    Disposition: Make an appointment to follow-up in 3 months  Do your blood work 2 weeks prior to next appointment    Thank you kindly for allowing me to participate in the thyroid care plan for this patient.    CHIKA JacksonRCaydenN.  04/18/24      CC:   Tee Cedeño M.D.  "

## 2024-05-09 PROCEDURE — RXMED WILLOW AMBULATORY MEDICATION CHARGE: Performed by: NURSE PRACTITIONER

## 2024-05-10 ENCOUNTER — TELEPHONE (OUTPATIENT)
Dept: SLEEP MEDICINE | Facility: MEDICAL CENTER | Age: 64
End: 2024-05-10
Payer: COMMERCIAL

## 2024-05-10 NOTE — TELEPHONE ENCOUNTER
Patient called and left a message stating that [facility] needs an updated order. Patient did not leave any other details.

## 2024-05-13 ENCOUNTER — PATIENT MESSAGE (OUTPATIENT)
Dept: CARDIOLOGY | Facility: MEDICAL CENTER | Age: 64
End: 2024-05-13
Payer: COMMERCIAL

## 2024-05-13 DIAGNOSIS — E87.6 HYPOKALEMIA: ICD-10-CM

## 2024-05-13 DIAGNOSIS — I16.0 HYPERTENSIVE URGENCY: ICD-10-CM

## 2024-05-13 NOTE — PATIENT COMMUNICATION
Alannahy: Can you please request labs for patient from [facility] in [place]? Thank you.

## 2024-05-15 ENCOUNTER — PHARMACY VISIT (OUTPATIENT)
Dept: PHARMACY | Facility: MEDICAL CENTER | Age: 64
End: 2024-05-15
Payer: COMMERCIAL

## 2024-05-16 NOTE — PATIENT COMMUNICATION
JA: Please see labs from 4/9/24 located in [facility]. Potassium is 3.4. Please advise on refill request/ dosages

## 2024-05-17 RX ORDER — CHLORTHALIDONE 25 MG/1
25 TABLET ORAL EVERY EVENING
Qty: 90 TABLET | Refills: 3 | Status: SHIPPED | OUTPATIENT
Start: 2024-05-17

## 2024-05-17 RX ORDER — POTASSIUM CHLORIDE 20 MEQ/1
40 TABLET, EXTENDED RELEASE ORAL 2 TIMES DAILY
Qty: 360 TABLET | Refills: 2 | Status: SHIPPED | OUTPATIENT
Start: 2024-05-17

## 2024-05-27 ENCOUNTER — PATIENT MESSAGE (OUTPATIENT)
Dept: CARDIOLOGY | Facility: MEDICAL CENTER | Age: 64
End: 2024-05-27
Payer: COMMERCIAL

## 2024-06-06 PROCEDURE — RXMED WILLOW AMBULATORY MEDICATION CHARGE: Performed by: NURSE PRACTITIONER

## 2024-06-11 ENCOUNTER — PHARMACY VISIT (OUTPATIENT)
Dept: PHARMACY | Facility: MEDICAL CENTER | Age: 64
End: 2024-06-11
Payer: COMMERCIAL

## 2024-06-24 PROCEDURE — RXMED WILLOW AMBULATORY MEDICATION CHARGE: Performed by: NURSE PRACTITIONER

## 2024-06-28 ENCOUNTER — APPOINTMENT (RX ONLY)
Dept: URBAN - METROPOLITAN AREA CLINIC 36 | Facility: CLINIC | Age: 64
Setting detail: DERMATOLOGY
End: 2024-06-28

## 2024-06-28 ENCOUNTER — PHARMACY VISIT (OUTPATIENT)
Dept: PHARMACY | Facility: MEDICAL CENTER | Age: 64
End: 2024-06-28
Payer: COMMERCIAL

## 2024-06-28 DIAGNOSIS — Z41.9 ENCOUNTER FOR PROCEDURE FOR PURPOSES OTHER THAN REMEDYING HEALTH STATE, UNSPECIFIED: ICD-10-CM

## 2024-06-28 PROCEDURE — ? KYBELLA INJECTION

## 2024-06-28 PROCEDURE — ? BOTOX

## 2024-06-28 NOTE — PROCEDURE: BOTOX
Lateral Platysmal Bands Units: 0
Show Right And Left Brow Units: No
Show Additional Area 4: Yes
Periorbital Skin Units: 20
Additional Area 1 Location: upper lip
Expiration Date (Month Year): 09/2026
Incrementing Botox Units: By 0.5 Units
Lot #: [id number]
Detail Level: Detailed
Consent: Written consent obtained. Risks include but not limited to lid/brow ptosis, bruising, swelling, diplopia, temporary effect, incomplete chemical denervation.
Forehead Units: 10
Additional Area 3 Location: masseter
Dilution (U/0.1 Cc): 0.1
Glabellar Complex Units: 14
Post-Care Instructions: Patient instructed to not lie down for 4 hours and limit physical activity for 24 hours. Patient instructed not to travel by airplane for 48 hours.
Depressor Anguli Oris Units: 6
Additional Area 2 Location: chin

## 2024-06-28 NOTE — PROCEDURE: KYBELLA INJECTION
Lot #: [id number]
Detail Level: Detailed
Anesthesia Volume In Cc: 3
Number Of Grid Dots (Won't Render If 0): 0
Procedure Text: The treatment areas were cleansed. Kybella was administered using the parameters mentioned above.
Kybella Volume In Cc (Won't Render If 0): 2
Anesthesia Type: 1% lidocaine with 1:200,000 epinephrine and a 1:10 solution of 8.4% sodium bicarbonate
Price (Use Numbers Only, No Special Characters Or $): 0.00
Consent: Written consent obtained. Risks include but not limited to bruising, beading, irregular texture, ulceration, infection, allergic reaction, scar formation, incomplete augmentation, temporary nature, procedural pain.
Packages Used (Won't Render If 0 - Required If Using Inventory): 1
Treatment Area: submental chin
Post-Care Instructions: Patient instructed to apply ice to reduce swelling.

## 2024-07-08 ENCOUNTER — NON-PROVIDER VISIT (OUTPATIENT)
Dept: CARDIOLOGY | Facility: MEDICAL CENTER | Age: 64
End: 2024-07-08
Attending: PHYSICIAN ASSISTANT
Payer: COMMERCIAL

## 2024-07-08 VITALS — BODY MASS INDEX: 33.6 KG/M2 | WEIGHT: 221 LBS

## 2024-07-08 DIAGNOSIS — R73.03 PREDIABETES: ICD-10-CM

## 2024-07-08 DIAGNOSIS — E66.01 MORBID OBESITY WITH BMI OF 40.0-44.9, ADULT (HCC): ICD-10-CM

## 2024-07-08 DIAGNOSIS — E78.00 HYPERCHOLESTEROLEMIA: ICD-10-CM

## 2024-07-08 DIAGNOSIS — R73.01 ELEVATED FASTING BLOOD SUGAR: ICD-10-CM

## 2024-07-08 PROCEDURE — 99212 OFFICE O/P EST SF 10 MIN: CPT | Performed by: INTERNAL MEDICINE

## 2024-07-08 RX ORDER — SEMAGLUTIDE 1.34 MG/ML
1 INJECTION, SOLUTION SUBCUTANEOUS
Qty: 6 ML | Refills: 5 | Status: SHIPPED | OUTPATIENT
Start: 2024-07-08

## 2024-07-08 ASSESSMENT — FIBROSIS 4 INDEX: FIB4 SCORE: 0.87

## 2024-07-09 DIAGNOSIS — I10 ESSENTIAL HYPERTENSION: ICD-10-CM

## 2024-07-10 ENCOUNTER — PATIENT MESSAGE (OUTPATIENT)
Dept: CARDIOLOGY | Facility: MEDICAL CENTER | Age: 64
End: 2024-07-10
Payer: COMMERCIAL

## 2024-07-10 RX ORDER — VALSARTAN 320 MG/1
320 TABLET ORAL DAILY
Qty: 90 TABLET | Refills: 3 | Status: SHIPPED | OUTPATIENT
Start: 2024-07-10

## 2024-07-31 PROCEDURE — RXMED WILLOW AMBULATORY MEDICATION CHARGE: Performed by: NURSE PRACTITIONER

## 2024-08-05 ENCOUNTER — PHARMACY VISIT (OUTPATIENT)
Dept: PHARMACY | Facility: MEDICAL CENTER | Age: 64
End: 2024-08-05
Payer: COMMERCIAL

## 2024-09-18 PROCEDURE — RXMED WILLOW AMBULATORY MEDICATION CHARGE: Performed by: NURSE PRACTITIONER

## 2024-09-24 ENCOUNTER — PHARMACY VISIT (OUTPATIENT)
Dept: PHARMACY | Facility: MEDICAL CENTER | Age: 64
End: 2024-09-24
Payer: COMMERCIAL

## 2024-10-07 ENCOUNTER — NON-PROVIDER VISIT (OUTPATIENT)
Dept: CARDIOLOGY | Facility: MEDICAL CENTER | Age: 64
End: 2024-10-07
Attending: PHYSICIAN ASSISTANT
Payer: COMMERCIAL

## 2024-10-07 VITALS — WEIGHT: 215 LBS | BODY MASS INDEX: 32.69 KG/M2

## 2024-10-07 DIAGNOSIS — E78.00 HYPERCHOLESTEROLEMIA: ICD-10-CM

## 2024-10-07 DIAGNOSIS — R73.03 PREDIABETES: ICD-10-CM

## 2024-10-07 DIAGNOSIS — E66.01 MORBID OBESITY WITH BMI OF 40.0-44.9, ADULT (HCC): ICD-10-CM

## 2024-10-07 DIAGNOSIS — R73.01 ELEVATED FASTING BLOOD SUGAR: ICD-10-CM

## 2024-10-07 PROCEDURE — 99212 OFFICE O/P EST SF 10 MIN: CPT | Performed by: INTERNAL MEDICINE

## 2024-10-07 RX ORDER — SEMAGLUTIDE 1.34 MG/ML
1 INJECTION, SOLUTION SUBCUTANEOUS
Qty: 9 ML | Refills: 3 | Status: SHIPPED | OUTPATIENT
Start: 2024-10-07

## 2024-10-07 ASSESSMENT — FIBROSIS 4 INDEX: FIB4 SCORE: 0.87

## 2024-11-12 ENCOUNTER — DOCUMENTATION (OUTPATIENT)
Dept: VASCULAR LAB | Facility: MEDICAL CENTER | Age: 64
End: 2024-11-12
Payer: COMMERCIAL

## 2024-11-13 NOTE — PROGRESS NOTES
[facility] for [facility]      Called the patient to see if we are able to reschedule the upcoming follow up appt in clinic from 1/13/25 to 1/27/25 instead.   Requested the patient call the clinic back to set a time for that day or another day that works best for the patient.     Will reach out to the patient again at a later time as well.     Jennifer MaoD

## 2024-11-15 PROCEDURE — RXMED WILLOW AMBULATORY MEDICATION CHARGE: Performed by: NURSE PRACTITIONER

## 2024-11-19 ENCOUNTER — PHARMACY VISIT (OUTPATIENT)
Dept: PHARMACY | Facility: MEDICAL CENTER | Age: 64
End: 2024-11-19
Payer: COMMERCIAL

## 2024-12-04 DIAGNOSIS — E03.9 HYPOTHYROIDISM, ACQUIRED: ICD-10-CM

## 2024-12-04 RX ORDER — LEVOTHYROXINE SODIUM 112 UG/1
112 TABLET ORAL
Qty: 90 TABLET | Refills: 0 | Status: SHIPPED | OUTPATIENT
Start: 2024-12-04 | End: 2024-12-31

## 2024-12-04 NOTE — TELEPHONE ENCOUNTER
Received request via: Patient    Was the patient seen in the last year in this department? Yes    Does the patient have an active prescription (recently filled or refills available) for medication(s) requested? No    Pharmacy Name: [facility] in [facility]: only fill until follow up apt    Does the patient have care home Plus and need 100-day supply? (This applies to ALL medications) Patient does not have SCP

## 2024-12-26 ENCOUNTER — TELEPHONE (OUTPATIENT)
Dept: ENDOCRINOLOGY | Facility: MEDICAL CENTER | Age: 64
End: 2024-12-26
Payer: COMMERCIAL

## 2024-12-26 NOTE — TELEPHONE ENCOUNTER
"Received Refill PA request via  [employer] for Synthroid 112mcg Tablet. (Quantity:30, Day Supply:30)  Insurance: [employer] [place]  Member ID: [id number]  BIN: [id number]  PCN: [id number]  Group: [id number]   Ran Test claim via [employer] & medication rejects \"Plan/Benefit Exclusion.\"    Prior Authorization has been submitted via Cover My Meds: Key ([id number])  Will follow up in 24-48 business hours.     Thank you,  Lea Nicole, [facility]  Endocrinology Pharmacy Coordinator        "

## 2024-12-27 NOTE — TELEPHONE ENCOUNTER
Prior Authorization for Synthroid 112mcg Tablet has been DENIED for a quantity of 30 , day supply 30  Prior authorization was denied per the following: Product/Service is Not Covered: Drug is not on Formulary. Further detail of the criteria has been scanned under .  Prior Authorization denial reference number: [id number]  Insurance: [employer]  Next Steps: Advised provider of denial and awaiting her response.     Thank you,  Lea Nicole, [facility]  Endocrinology Pharmacy Coordinator

## 2024-12-30 NOTE — TELEPHONE ENCOUNTER
Dionte,  Patient called back and agreed to go through [employer].    Thank you,  Lea Nicole, [facility]  Endocrinology Pharmacy Coordinator

## 2024-12-31 DIAGNOSIS — E03.9 HYPOTHYROIDISM, ACQUIRED: ICD-10-CM

## 2024-12-31 RX ORDER — LEVOTHYROXINE SODIUM 112 MCG
112 TABLET ORAL
Qty: 90 TABLET | Refills: 3 | Status: SHIPPED | OUTPATIENT
Start: 2024-12-31

## 2025-01-16 ENCOUNTER — NON-PROVIDER VISIT (OUTPATIENT)
Dept: CARDIOLOGY | Facility: MEDICAL CENTER | Age: 65
End: 2025-01-16
Attending: PHYSICIAN ASSISTANT
Payer: COMMERCIAL

## 2025-01-16 ENCOUNTER — PHARMACY VISIT (OUTPATIENT)
Dept: PHARMACY | Facility: MEDICAL CENTER | Age: 65
End: 2025-01-16
Payer: COMMERCIAL

## 2025-01-16 VITALS — BODY MASS INDEX: 32.99 KG/M2 | WEIGHT: 217 LBS

## 2025-01-16 PROCEDURE — RXMED WILLOW AMBULATORY MEDICATION CHARGE: Performed by: NURSE PRACTITIONER

## 2025-01-16 PROCEDURE — 99212 OFFICE O/P EST SF 10 MIN: CPT | Performed by: PHARMACIST

## 2025-01-16 ASSESSMENT — FIBROSIS 4 INDEX: FIB4 SCORE: 0.87

## 2025-01-16 NOTE — PROGRESS NOTES
Patient Consult Note    Primary care physician: Pcp Pt States None    Reason for consult: Obesity/Weight Management    HPI:  Nazia Lyle is a 64 y.o. old patient who comes in today for evaluation of above stated problem.    Initial Weight: 279 lbs  Initial BMI: 42.42 kg/m2    Current Weight: 217 lbs   Current BMI: 32.99 kg/m2      Most Recent HbA1c:   Lab Results   Component Value Date/Time    HBA1C 5.6 06/07/2023 10:01 AM        Most Recent TSH:       Most Recent SrCr and GFR:      Current Obesity Medication Regimen  GIP and/or GLP-1 Analog: semaglutide (Ozempic) 1 mg subQ once weekly  For use as an adjunct to diet and exercise in patients with a BMI >=30 kg/m2, or in patients with a BMI >=27 kg/m2 and >=1 weight-associated comorbidity (eg, HTN, HLD, MADDI, T2DM, etc).  Pt denies personal/family hx of medullary thyroid carcinoma or multiple endocrine neoplasia syndrome type 2 (MEN 2).    Previous Obesity Medication(s) and Reason for Discontinuation  Phentermine and metformin - Pt states that metformin was Dc'd d/t being lost to f/u w/ other wt management clinic. Pt w/ hx of IBD - can only tolerate metformin 500 mg 1/2 tab BID.     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 Plans to increase walking to 5-7 days per week (1/4-1/2 mile to 1/2 to 1 mile) as tolerated.   Exercise Goal - At least 150 min/week of anything aerobic.    Dietary: 2 meals w/ snacks. Measures portion sizes stringently. Monitors caloric intake, but has been less stringent w/ the holidays. Tries to moderate sugars.  Breakfast/lunch - Cottage cheese, eggs  Dinner - Lean protein, vegetables  Snack - Fruit, salad  Dessert - Sugar free popsicles, raw honey, PB  Beverage - Water mostly, 1 cup (w/ half and half), tea (w/ Stevia and/or half and half)      Past Medical History:  Patient Active Problem List    Diagnosis Date Noted    Prediabetes 10/04/2021     Hypercholesterolemia 03/31/2021    Metabolic syndrome 08/19/2020    Weight gain 04/20/2020    Inflammatory bowel disease 04/20/2020    LVH (left ventricular hypertrophy) 04/20/2020    Family history of coronary artery disease 03/17/2020    Vitamin D deficiency 11/22/2019    Hypothyroidism 08/08/2014    Elevated fasting blood sugar 08/08/2014    Hot flash, menopausal 08/08/2014    Morbid obesity with BMI of 40.0-44.9, adult (HCC) 08/08/2014    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    Essential hypertension 09/17/2012    Mixed hyperlipidemia 09/17/2012       Past Surgical History:  Past Surgical History:   Procedure Laterality Date    FUSION AND ARTHROEREISIS, JOINT, FOOT AND ANKLE Left 6/22/2023    Procedure: LEFT ARTHRODESIS TARSOMETATARSAL MIDFOOT FUSION SINGLE JOINT WITH NEUROLYSIS OF DEEP PERONEAL NERVE;  Surgeon: Wm Jose Ro M.D.;  Location: SURGERY [facility];  Service: Orthopedics    HARDWARE REMOVAL UPPER EXTREMITY Left 08/11/2015    Procedure: HARDWARE REMOVAL UPPER EXTREMITY;  Surgeon: Chaka Mckeon M.D.;  Location: SURGERY [facility];  Service:     HAND SURGERY Right 2014    middle finger    APPENDECTOMY      CHOLECYSTECTOMY      PRIMARY C SECTION      TONSILLECTOMY         Allergies:  Latex and Tape    Social History:  Social History     Socioeconomic History    Marital status:      Spouse name: Not on file    Number of children: Not on file    Years of education: Not on file    Highest education level: Not on file   Occupational History    Not on file   Tobacco Use    Smoking status: Never    Smokeless tobacco: Never   Vaping Use    Vaping status: Never Used   Substance and Sexual Activity    Alcohol use: No    Drug use: No    Sexual activity: Not on file   Other Topics Concern    Not on file   Social History Narrative    Not on file     Social Drivers of Health     Financial Resource Strain: Not on file   Food Insecurity: Not on file   Transportation Needs: Not on file    Physical Activity: Not on file   Stress: Not on file   Social Connections: Not on file   Intimate Partner Violence: Not on file   Housing Stability: Not on file       Family History:  Family History   Problem Relation Age of Onset    Thyroid Mother     Diabetes Father     Hypertension Father     Diabetes Maternal Grandmother     Diabetes Maternal Grandfather     Heart Disease Paternal Grandmother     Cancer Paternal Grandfather        Medications:    Current Outpatient Medications:     SYNTHROID 112 MCG Tab, Take 1 Tablet by mouth every morning on an empty stomach., Disp: 90 Tablet, Rfl: 3    Semaglutide, 1 MG/DOSE, (OZEMPIC, 1 MG/DOSE,) 4 MG/3ML Solution Pen-injector, Inject 1 mg under the skin every 7 days., Disp: 9 mL, Rfl: 3    valsartan (DIOVAN) 320 MG tablet, Take 1 Tablet by mouth every day., Disp: 90 Tablet, Rfl: 3    chlorthalidone (HYGROTON) 25 MG Tab, Take 1 Tablet by mouth every evening., Disp: 90 Tablet, Rfl: 3    potassium chloride SA (KDUR) 20 MEQ Tab CR, Take 2 Tablets by mouth 2 times a day., Disp: 360 Tablet, Rfl: 2    atorvastatin (LIPITOR) 40 MG Tab, Take 1 Tablet by mouth every evening., Disp: 90 Tablet, Rfl: 3    Probiotic Product (PROBIOTIC PO), Take  by mouth every day., Disp: , Rfl:     Zinc 22.5 MG Tab, Take  by mouth every day., Disp: , Rfl:     omega-3 acid ethyl esters (LOVAZA) 1 GM capsule, Take 2 Capsules by mouth 2 times a day., Disp: 360 Capsule, Rfl: 3    Ascorbic Acid (VITAMIN C CR) 1500 MG Tab CR, every day., Disp: , Rfl:     Colostrum 500 MG Cap, every day., Disp: , Rfl:     Physical Examination:  Vital signs: Wt 98.4 kg (217 lb)   BMI 32.99 kg/m²      Assessment and Plan:    1. Obesity/Weight Management  Pt presents to clinic stating that she's been doing well since last visit.   No issues w/ her Ozempic Rx. Continues to note appetite suppression. No affordability concerns.  Since establishing w/ pt she's lost ~ 64 lbs. Since last visit she's gained ~ 2 lbs. Her goal weight  is ~ 180 lbs.  Pt very stringent w/ her diet and portion size moderation. She will work to increase physical activity further.  Pt notes that she did indulge in more sugar over the holidays. She has also been busier with work so she's been less active d/t this.   D/w pt that given room for improvement in regard to lifestyle, we will continue current GLP1 dose. Will consider dose increase if pt hit's a plateau or continues to gain weight at follow up.     - Medication changes:  None   - Continue:  Semaglutide (Ozempic) 1 mg subQ once weekly     - Lifestyle changes:  Diet: Monitor caloric intake - aim for deficit. Maximize lean proteins and veggies. Cut out/down on carbs. Avoid simple sugars.   Referral to Nutrition Services placed: No   Exercise: Increase as tolerated. Goal of at least 150 min/week of anything aerobic.    Follow Up:  7 weeks - closer f/u given slight weight gain    Michael Strong, DaylinD

## 2025-01-23 ENCOUNTER — TELEPHONE (OUTPATIENT)
Dept: ENDOCRINOLOGY | Facility: MEDICAL CENTER | Age: 65
End: 2025-01-23
Payer: MEDICARE

## 2025-01-23 ENCOUNTER — TELEMEDICINE (OUTPATIENT)
Dept: ENDOCRINOLOGY | Facility: MEDICAL CENTER | Age: 65
End: 2025-01-23
Payer: MEDICARE

## 2025-01-23 ENCOUNTER — APPOINTMENT (OUTPATIENT)
Dept: URBAN - METROPOLITAN AREA CLINIC 20 | Facility: CLINIC | Age: 65
Setting detail: DERMATOLOGY
End: 2025-01-23

## 2025-01-23 VITALS — WEIGHT: 215 LBS | BODY MASS INDEX: 32.58 KG/M2 | HEIGHT: 68 IN

## 2025-01-23 DIAGNOSIS — E55.9 VITAMIN D DEFICIENCY: ICD-10-CM

## 2025-01-23 DIAGNOSIS — E06.3 HASHIMOTO'S DISEASE: ICD-10-CM

## 2025-01-23 DIAGNOSIS — E03.9 HYPOTHYROIDISM, ACQUIRED: ICD-10-CM

## 2025-01-23 DIAGNOSIS — R73.03 PREDIABETES: ICD-10-CM

## 2025-01-23 DIAGNOSIS — Z41.9 ENCOUNTER FOR PROCEDURE FOR PURPOSES OTHER THAN REMEDYING HEALTH STATE, UNSPECIFIED: ICD-10-CM

## 2025-01-23 PROCEDURE — ? FILLERS

## 2025-01-23 PROCEDURE — 99214 OFFICE O/P EST MOD 30 MIN: CPT | Mod: 95

## 2025-01-23 PROCEDURE — ? BOTOX

## 2025-01-23 ASSESSMENT — FIBROSIS 4 INDEX: FIB4 SCORE: 0.87

## 2025-01-23 NOTE — PROCEDURE: BOTOX
Additional Area 2 Units: 0
Show Additional Area 3: Yes
Additional Area 1 Location: upper lip
Show Right And Left Pupillary Line Units: No
Lot #: [id number]
Expiration Date (Month Year): 02/2027
Detail Level: Detailed
Glabellar Complex Units: 14
Dilution (U/0.1 Cc): 0.1
Consent: Written consent obtained. Risks include but not limited to lid/brow ptosis, bruising, swelling, diplopia, temporary effect, incomplete chemical denervation.
Incrementing Botox Units: By 0.5 Units
Depressor Anguli Oris Units: 6
Additional Area 3 Location: masseter
Additional Area 2 Location: chin
Forehead Units: 10
Post-Care Instructions: Patient instructed to not lie down for 4 hours and limit physical activity for 24 hours. Patient instructed not to travel by airplane for 48 hours.
Periorbital Skin Units: 20

## 2025-01-23 NOTE — PROCEDURE: FILLERS
Nasolabial Folds Filler Volume In Cc: 0
Consent: Written consent obtained. Risks include but not limited to bruising, beading, irregular texture, ulceration, infection, allergic reaction, scar formation, incomplete augmentation, temporary nature, procedural pain.
Post-Care Instructions: Patient instructed to apply ice to reduce swelling.
Include Cannula Information In Note?: No
Additional Anesthesia Volume In Cc: 6
Additional Area 1 Location: glabella with cannula
Map Statment: See Attach Map for Complete Details
Include Cannula Size?: 25G
Additional Area 2 Location: facial scars right cheek
Filler: Juvederm Ultra
Lot #: [id number]
Additional Area 3 Location: upper cutaneous lip
Include Cannula Length?: 1.5 inch
Expiration Date (Month Year): 6/4/2020
Nasolabial Folds Filler Volume In Cc: 0.5
Aspiration Statement: Aspiration was performed prior to injecting site with filler.
Anesthesia Type: 1% lidocaine with epinephrine
Detail Level: Detailed
Filler Comments: Sample product

## 2025-01-23 NOTE — PROGRESS NOTES
Chief Complaint: Consult requested by Tee Cedeño M.D. for evaluation of the following conditions  This evaluation was conducted via Teams using secure and encrypted videoconferencing technology. The patient was in their home in the [facility].    The patient's identity was confirmed and verbal consent was obtained for this virtual visit.   HPI:   Nazia Lyle is a 63 y.o. female   She was previously followed by Gus DEWITT  History of IBS   Her last visit was over a year ago.    Hypothyroidism, acquired:  Diagnosed many years ago.     She is currently on Synthroid 112 mcg daily    She reports Good compliance and takes her thyroid hormone daily before breakfast.    She denies taking any iron, calcium supplements or antacids.  Denies multivitamins  Denies taking biotin       She denies weight gain, palpitations, feeling cold and cold intolerance, constipation, swelling, feeling slow, losing hair, anxiousness, feeling excessive energy, tremulousness, sweating, and weight loss.      We do not have updated blood work for this visit.     Blood work done at [place] 7/28/2023  -TSH at 0.10 (0.47-4.90)  -Free T4 1.45 (0.47-4.90)       2.  Hashimoto's disease:  Presumable etiology of her hypothyroidism        3.  Prediabetes:  FV by pcp   POC A1c on 4/18/2023 at 5.8%  Ozempic 1 mg weekly     4.  Vitamin D deficiency:  Currently taking vitamin D 3 1000 IU daily    Blood work done at [place] 7/28/2023  Vit D3 total at 43      Patient's medications, allergies, and social histories were reviewed and updated as appropriate.      ROS:     CONS:     No fever, no chills, no weight loss, no fatigue   EYES:      No diplopia, no blurry vision, no redness of eyes, no swelling of eyelids   ENT:    No hearing loss, No ear pain, No sore throat, no dysphagia, no neck swelling   CV:     No chest pain, no palpitations, no claudication, no orthopnea, no PND   PULM:    No SOB, no cough, no hemoptysis, no wheezing    GI:    No nausea, no vomiting, no diarrhea, no constipation, no bloody stools   :  Passing urine well, no dysuria, no hematuria   ENDO:   No polyuria, no polydipsia, no heat intolerance, no cold intolerance   NEURO: No headaches, no dizziness, no convulsions, no tremors   MUSC:  No joint swellings, no arthralgias, no myalgias, no weakness   SKIN:   No rash, no ulcers, no dry skin   PSYCH:   No depression, no anxiety, no difficulty sleeping       Past Medical History:  Patient Active Problem List    Diagnosis Date Noted    Prediabetes 10/04/2021    Hypercholesterolemia 03/31/2021    Metabolic syndrome 08/19/2020    Weight gain 04/20/2020    Inflammatory bowel disease 04/20/2020    LVH (left ventricular hypertrophy) 04/20/2020    Family history of coronary artery disease 03/17/2020    Vitamin D deficiency 11/22/2019    Hypothyroidism 08/08/2014    Elevated fasting blood sugar 08/08/2014    Hot flash, menopausal 08/08/2014    Morbid obesity with BMI of 40.0-44.9, adult (HCC) 08/08/2014    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    Essential hypertension 09/17/2012    Mixed hyperlipidemia 09/17/2012       Past Surgical History:  Past Surgical History:   Procedure Laterality Date    FUSION AND ARTHROEREISIS, JOINT, FOOT AND ANKLE Left 6/22/2023    Procedure: LEFT ARTHRODESIS TARSOMETATARSAL MIDFOOT FUSION SINGLE JOINT WITH NEUROLYSIS OF DEEP PERONEAL NERVE;  Surgeon: Wm Jose Ro M.D.;  Location: SURGERY [facility];  Service: Orthopedics    HARDWARE REMOVAL UPPER EXTREMITY Left 08/11/2015    Procedure: HARDWARE REMOVAL UPPER EXTREMITY;  Surgeon: Chaka Mckeon M.D.;  Location: SURGERY [facility];  Service:     HAND SURGERY Right 2014    middle finger    APPENDECTOMY      CHOLECYSTECTOMY      PRIMARY C SECTION      TONSILLECTOMY          Allergies:  Latex and Nkda [no known drug allergy]     Current Medications:    Current Outpatient Medications:     SYNTHROID 112 MCG Tab, Take 1 Tablet by mouth every morning on  an empty stomach., Disp: 90 Tablet, Rfl: 3    Semaglutide, 1 MG/DOSE, (OZEMPIC, 1 MG/DOSE,) 4 MG/3ML Solution Pen-injector, Inject 1 mg under the skin every 7 days., Disp: 9 mL, Rfl: 3    valsartan (DIOVAN) 320 MG tablet, Take 1 Tablet by mouth every day., Disp: 90 Tablet, Rfl: 3    chlorthalidone (HYGROTON) 25 MG Tab, Take 1 Tablet by mouth every evening., Disp: 90 Tablet, Rfl: 3    potassium chloride SA (KDUR) 20 MEQ Tab CR, Take 2 Tablets by mouth 2 times a day., Disp: 360 Tablet, Rfl: 2    atorvastatin (LIPITOR) 40 MG Tab, Take 1 Tablet by mouth every evening., Disp: 90 Tablet, Rfl: 3    Probiotic Product (PROBIOTIC PO), Take  by mouth every day., Disp: , Rfl:     Zinc 22.5 MG Tab, Take  by mouth every day., Disp: , Rfl:     omega-3 acid ethyl esters (LOVAZA) 1 GM capsule, Take 2 Capsules by mouth 2 times a day., Disp: 360 Capsule, Rfl: 3    Ascorbic Acid (VITAMIN C CR) 1500 MG Tab CR, every day., Disp: , Rfl:     Colostrum 500 MG Cap, every day., Disp: , Rfl:     Social History:  Social History     Socioeconomic History    Marital status:      Spouse name: Not on file    Number of children: Not on file    Years of education: Not on file    Highest education level: Not on file   Occupational History    Not on file   Tobacco Use    Smoking status: Never    Smokeless tobacco: Never   Vaping Use    Vaping status: Never Used   Substance and Sexual Activity    Alcohol use: No    Drug use: No    Sexual activity: Not on file   Other Topics Concern    Not on file   Social History Narrative    Not on file     Social Drivers of Health     Financial Resource Strain: Not on file   Food Insecurity: Not on file   Transportation Needs: Not on file   Physical Activity: Not on file   Stress: Not on file   Social Connections: Not on file   Intimate Partner Violence: Not on file   Housing Stability: Not on file        Family History:   Family History   Problem Relation Age of Onset    Thyroid Mother     Diabetes Father      "Hypertension Father     Diabetes Maternal Grandmother     Diabetes Maternal Grandfather     Heart Disease Paternal Grandmother     Cancer Paternal Grandfather          PHYSICAL EXAM:   Vital signs: Ht 1.727 m (5' 8\")   Wt 97.5 kg (215 lb)   BMI 32.69 kg/m²   GENERAL: Well-developed, well-nourished  in no apparent distress.    SKIN: No rashes, lesions. Turgor is normal.    Labs:      Imaging:      ASSESSMENT/PLAN:     1. Hypothyroidism, acquired  Unstable  Medication:  Synthroid 112 mcg daily - continue  I have ordered blood work to be done this week for review and dose adjustment.   Patient currently is asymptomatic.     2. Hashimoto's disease  This is the etiology of her hypothyroidism  - TSH; Future  - FREE THYROXINE; Future    3. Prediabetes  Stable  Patient would like to have her A1c. I will order this today.   She follows pcp for her prediabetes.   Medication:  Ozempic 1 mg weekly - continue  - HEMOGLOBIN A1C; Future    4. Vitamin D deficiency  Stable  Continue regimen  - VITAMIN D,25 HYDROXY (DEFICIENCY); Future    Disposition: Return in about 6 months (around 7/23/2025).   Patient will have blood work done this week for review and dose adjustment.     Thank you kindly for allowing me to participate in the thyroid care plan for this patient.    Dionte Kong A.P.R.N.  1/23/25      CC:   Tee Cedeño M.D.  "

## 2025-02-21 ENCOUNTER — PATIENT MESSAGE (OUTPATIENT)
Dept: CARDIOLOGY | Facility: MEDICAL CENTER | Age: 65
End: 2025-02-21
Payer: COMMERCIAL

## 2025-03-05 NOTE — PROGRESS NOTES
Patient Consult Note    Primary care physician: Pcp Pt States None    Date of Referral: 3/5/25 (Renewed today)    Reason for consult: Obesity/Weight Management    HPI:  Nazia Lyle is a 64 y.o. old patient who comes in today for evaluation of above stated problem.    Initial Weight: 279 lbs  Initial BMI: 42.42 kg/m2    Current Weight: 214 lbs   Current BMI: 32.54 kg/m2      Most Recent HbA1c:   Lab Results   Component Value Date/Time    HBA1C 5.6 06/07/2023 10:01 AM        Most Recent TSH:       Most Recent SrCr and GFR:      Current Obesity Medication Regimen  GIP and/or GLP-1 Analog: semaglutide (Ozempic) 1 mg subQ once weekly  For use as an adjunct to diet and exercise in patients with a BMI >=30 kg/m2, or in patients with a BMI >=27 kg/m2 and >=1 weight-associated comorbidity (eg, HTN, HLD, MADDI, T2DM, etc).  Pt denies personal/family hx of medullary thyroid carcinoma or multiple endocrine neoplasia syndrome type 2 (MEN 2).    Previous Obesity Medication(s) and Reason for Discontinuation  Phentermine and metformin - Pt states that metformin was Dc'd d/t being lost to f/u w/ other wt management clinic. Pt w/ hx of IBD - can only tolerate metformin 500 mg 1/2 tab BID.     Lifestyle  Physical Activity:  Current Exercise - Does pilates twice per week for 15 to 20 min per workout. Plans to walk more as the weather warms up.  Exercise Goal - At least 150 min/week of anything aerobic.    Dietary: 2 meals w/ snacks. Measures portion sizes stringently. Monitors caloric intake (~ 500 calories/day). Tries to moderate sugars - craving more lately.  Breakfast/lunch - Cottage cheese, eggs  Dinner - Lean protein, vegetables  Snack - Fruit, salad  Dessert - Sugar free popsicles, raw honey, PB  Beverage - Water mostly, 1 cup of coffee (w/ half and half), tea (w/ Stevia and/or half and half)      Past Medical History:  Patient Active Problem List    Diagnosis Date Noted    Prediabetes 10/04/2021    Hypercholesterolemia  03/31/2021    Metabolic syndrome 08/19/2020    Weight gain 04/20/2020    Inflammatory bowel disease 04/20/2020    LVH (left ventricular hypertrophy) 04/20/2020    Family history of coronary artery disease 03/17/2020    Vitamin D deficiency 11/22/2019    Hypothyroidism 08/08/2014    Elevated fasting blood sugar 08/08/2014    Hot flash, menopausal 08/08/2014    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    Essential hypertension 09/17/2012    Mixed hyperlipidemia 09/17/2012       Past Surgical History:  Past Surgical History:   Procedure Laterality Date    FUSION AND ARTHROEREISIS, JOINT, FOOT AND ANKLE Left 6/22/2023    Procedure: LEFT ARTHRODESIS TARSOMETATARSAL MIDFOOT FUSION SINGLE JOINT WITH NEUROLYSIS OF DEEP PERONEAL NERVE;  Surgeon: Wm Jose Ro M.D.;  Location: SURGERY [facility];  Service: Orthopedics    HARDWARE REMOVAL UPPER EXTREMITY Left 08/11/2015    Procedure: HARDWARE REMOVAL UPPER EXTREMITY;  Surgeon: Chaka Mckeon M.D.;  Location: SURGERY [facility];  Service:     HAND SURGERY Right 2014    middle finger    APPENDECTOMY      CHOLECYSTECTOMY      PRIMARY C SECTION      TONSILLECTOMY         Allergies:  Latex and Tape    Social History:  Social History     Socioeconomic History    Marital status:      Spouse name: Not on file    Number of children: Not on file    Years of education: Not on file    Highest education level: Not on file   Occupational History    Not on file   Tobacco Use    Smoking status: Never    Smokeless tobacco: Never   Vaping Use    Vaping status: Never Used   Substance and Sexual Activity    Alcohol use: No    Drug use: No    Sexual activity: Not on file   Other Topics Concern    Not on file   Social History Narrative    Not on file     Social Drivers of Health     Financial Resource Strain: Not on file   Food Insecurity: Not on file   Transportation Needs: Not on file   Physical Activity: Not on file   Stress: Not on file   Social Connections: Not on file    Intimate Partner Violence: Not on file   Housing Stability: Not on file       Family History:  Family History   Problem Relation Age of Onset    Thyroid Mother     Diabetes Father     Hypertension Father     Diabetes Maternal Grandmother     Diabetes Maternal Grandfather     Heart Disease Paternal Grandmother     Cancer Paternal Grandfather        Medications:    Current Outpatient Medications:     Semaglutide, 2 MG/DOSE, (OZEMPIC, 2 MG/DOSE,) 8 MG/3ML Solution Pen-injector, Inject 2 mg under the skin every 7 days., Disp: 6 mL, Rfl: 6    Biotin 10 MG/ML Liquid, Take  by mouth., Disp: , Rfl:     potassium chloride SA (KDUR) 20 MEQ Tab CR, Take 2 Tablets by mouth 2 times a day., Disp: 360 Tablet, Rfl: 2    chlorthalidone (HYGROTON) 25 MG Tab, Take 1 Tablet by mouth every evening., Disp: 90 Tablet, Rfl: 3    valsartan (DIOVAN) 320 MG tablet, Take 1 Tablet by mouth every day., Disp: 90 Tablet, Rfl: 3    omega-3 acid ethyl esters (LOVAZA) 1 GM capsule, Take 2 Capsules by mouth 2 times a day., Disp: 360 Capsule, Rfl: 3    SYNTHROID 112 MCG Tab, Take 1 Tablet by mouth every morning on an empty stomach., Disp: 90 Tablet, Rfl: 3    atorvastatin (LIPITOR) 40 MG Tab, Take 1 Tablet by mouth every evening., Disp: 90 Tablet, Rfl: 3    Probiotic Product (PROBIOTIC PO), Take  by mouth every day., Disp: , Rfl:     Zinc 22.5 MG Tab, Take  by mouth every day., Disp: , Rfl:     Ascorbic Acid (VITAMIN C CR) 1500 MG Tab CR, every day., Disp: , Rfl:     Colostrum 500 MG Cap, every day., Disp: , Rfl:     Physical Examination:  Vital signs: There were no vitals taken for this visit.     Assessment and Plan:    1. Obesity/Weight Management  Pt presents to clinic stating that she's been doing well since last visit. No issues w/ her Ozempic Rx in regard to SE, missed doses, or affordability.  Pt notes increased appetite since last visit. Was able to start monitoring caloric intake - eating ~ 500 calories daily.  Since establishing w/ pt  she's lost ~ 65 lbs. Since last visit she's lost ~ 3 lbs. Her goal weight is ~ 180 lbs.  Pt interested in increasing Ozempic dose up given her weight loss plateau.  Pt very stringent w/ her diet and portion size moderation. She will work to increase physical activity further - emphasized importance of this today in clinic as we are at the max dose of Ozempic w/ the next dose increase.   May consider Wegovy 2.4 mg in the future if needed.    - Medication changes:  INCREASE Semaglutide (Ozempic) to 2 mg subQ once weekly    - Continue:  N/a    - Lifestyle changes:  Diet: Monitor caloric intake - aim for deficit. Maximize lean proteins and veggies. Cut out/down on carbs. Avoid simple sugars.   Referral to Nutrition Services placed: No   Exercise: Increase as tolerated. Goal of at least 150 min/week of anything aerobic.    Follow Up:  3 months    Michael Strong, DaylinD

## 2025-03-06 ENCOUNTER — OFFICE VISIT (OUTPATIENT)
Dept: CARDIOLOGY | Facility: MEDICAL CENTER | Age: 65
End: 2025-03-06
Payer: COMMERCIAL

## 2025-03-06 ENCOUNTER — NON-PROVIDER VISIT (OUTPATIENT)
Dept: CARDIOLOGY | Facility: MEDICAL CENTER | Age: 65
End: 2025-03-06
Attending: PHYSICIAN ASSISTANT
Payer: COMMERCIAL

## 2025-03-06 VITALS
SYSTOLIC BLOOD PRESSURE: 130 MMHG | WEIGHT: 214 LBS | HEART RATE: 80 BPM | BODY MASS INDEX: 32.43 KG/M2 | OXYGEN SATURATION: 99 % | DIASTOLIC BLOOD PRESSURE: 68 MMHG | RESPIRATION RATE: 16 BRPM | HEIGHT: 68 IN

## 2025-03-06 DIAGNOSIS — I16.0 HYPERTENSIVE URGENCY: ICD-10-CM

## 2025-03-06 DIAGNOSIS — E78.2 MIXED HYPERLIPIDEMIA: ICD-10-CM

## 2025-03-06 DIAGNOSIS — Z82.49 FAMILY HISTORY OF CORONARY ARTERY DISEASE: ICD-10-CM

## 2025-03-06 DIAGNOSIS — I10 ESSENTIAL HYPERTENSION: ICD-10-CM

## 2025-03-06 DIAGNOSIS — R73.01 ELEVATED FASTING BLOOD SUGAR: ICD-10-CM

## 2025-03-06 DIAGNOSIS — R73.03 PREDIABETES: ICD-10-CM

## 2025-03-06 DIAGNOSIS — I34.0 NONRHEUMATIC MITRAL VALVE REGURGITATION: ICD-10-CM

## 2025-03-06 DIAGNOSIS — I51.7 LVH (LEFT VENTRICULAR HYPERTROPHY): ICD-10-CM

## 2025-03-06 DIAGNOSIS — E88.810 METABOLIC SYNDROME: ICD-10-CM

## 2025-03-06 DIAGNOSIS — E66.9 OBESITY (BMI 30-39.9): ICD-10-CM

## 2025-03-06 DIAGNOSIS — E87.6 HYPOKALEMIA: ICD-10-CM

## 2025-03-06 DIAGNOSIS — E66.01 MORBID OBESITY WITH BMI OF 40.0-44.9, ADULT (HCC): ICD-10-CM

## 2025-03-06 PROCEDURE — RXMED WILLOW AMBULATORY MEDICATION CHARGE

## 2025-03-06 PROCEDURE — 3078F DIAST BP <80 MM HG: CPT

## 2025-03-06 PROCEDURE — 99212 OFFICE O/P EST SF 10 MIN: CPT | Performed by: PHARMACIST

## 2025-03-06 PROCEDURE — 3075F SYST BP GE 130 - 139MM HG: CPT

## 2025-03-06 PROCEDURE — 99213 OFFICE O/P EST LOW 20 MIN: CPT

## 2025-03-06 PROCEDURE — 99214 OFFICE O/P EST MOD 30 MIN: CPT

## 2025-03-06 RX ORDER — OMEGA-3-ACID ETHYL ESTERS 1 G/1
2 CAPSULE, LIQUID FILLED ORAL 2 TIMES DAILY
Qty: 360 CAPSULE | Refills: 3 | Status: SHIPPED | OUTPATIENT
Start: 2025-03-06

## 2025-03-06 RX ORDER — VALSARTAN 320 MG/1
320 TABLET ORAL DAILY
Qty: 90 TABLET | Refills: 3 | Status: SHIPPED | OUTPATIENT
Start: 2025-03-06

## 2025-03-06 RX ORDER — SEMAGLUTIDE 2.68 MG/ML
2 INJECTION, SOLUTION SUBCUTANEOUS
Qty: 6 ML | Refills: 6 | Status: SHIPPED | OUTPATIENT
Start: 2025-03-06

## 2025-03-06 RX ORDER — VALSARTAN 320 MG/1
1 TABLET ORAL DAILY
COMMUNITY
End: 2025-03-06

## 2025-03-06 RX ORDER — CHLORTHALIDONE 25 MG/1
25 TABLET ORAL EVERY EVENING
Qty: 90 TABLET | Refills: 3 | Status: SHIPPED | OUTPATIENT
Start: 2025-03-06

## 2025-03-06 RX ORDER — CHLORTHALIDONE 25 MG/1
1 TABLET ORAL EVERY EVENING
COMMUNITY
End: 2025-03-06

## 2025-03-06 RX ORDER — POTASSIUM CHLORIDE 1500 MG/1
40 TABLET, EXTENDED RELEASE ORAL 2 TIMES DAILY
Qty: 360 TABLET | Refills: 2 | Status: SHIPPED | OUTPATIENT
Start: 2025-03-06

## 2025-03-06 ASSESSMENT — FIBROSIS 4 INDEX: FIB4 SCORE: 0.88

## 2025-03-06 ASSESSMENT — ENCOUNTER SYMPTOMS
PALPITATIONS: 0
DIZZINESS: 0
SHORTNESS OF BREATH: 1
ORTHOPNEA: 0
PND: 0

## 2025-03-06 NOTE — PROGRESS NOTES
Chief Complaint   Patient presents with    Hypertension     Follow up           Subjective:   Nazia Lyle is a 64 y.o. female who presents today for follow-up.     Previous patient of Dr. Herman.  Pertinent medical problems include hypertension, dyslipidemia, TELLES, obesity. Their last clinic visit was 3/25/2024 with myself    Since our last visit she reports doing well from cardiac standpoint. Blood pressure controlled. Patient reports taking blood pressure at home, sometimes she does get low and can tell but knows she needs to drink more water, episodes worse when she doesn't drink enough water or forgets to take her potassium. She has no chest pain, edema, orthopnea, PND, dizziness/lightheadedness, or palpitations. She has been having increase in shortenss of breath over the last 3-4 weeks which seems to be noticed more at rest. She does exercise and is able to do so without any exertional shortness of breath or chest pain.  Patient reports taking medications as prescribed.     Patient with significant weight loss since starting Wegovy in April 2023 but does feel like she has plateaued recently with her weight loss. Patient denies nausea or side effects. Being followed by [facility] and is hoping to get dose increased. Patient reports increased activity and exercise, walking outside and elliptical machine 1-2x per week. Working on increasing exercise. HbA1c 5.4     She recently got her labs done at [place] and brought her results today to the office.     Past Medical History:   Diagnosis Date    Anesthesia     PONV    Arthritis     osteoarthritis, hands    Bowel habit changes     IBD,, occasional diarrhea    Chickenpox     Dyslipidemia 09/17/2012    Fatty liver     Chinese measles     as a child    Heart murmur     High cholesterol     History of kidney stones     HTN (hypertension) 09/17/2012    Hypothyroid     IBD (inflammatory bowel disease)     Obesity 09/17/2012    PONV (postoperative nausea and  "vomiting)     Pre-diabetes     Sleep apnea     CPAP         Family History   Problem Relation Age of Onset    Thyroid Mother     Diabetes Father     Hypertension Father     Diabetes Maternal Grandmother     Diabetes Maternal Grandfather     Heart Disease Paternal Grandmother     Cancer Paternal Grandfather          Social History     Tobacco Use    Smoking status: Never    Smokeless tobacco: Never   Vaping Use    Vaping status: Never Used   Substance Use Topics    Alcohol use: No    Drug use: No         Allergies   Allergen Reactions    Latex Rash     rash    Tape Rash     .         Current Outpatient Medications   Medication Sig    Biotin 10 MG/ML Liquid Take  by mouth.    potassium chloride SA (KDUR) 20 MEQ Tab CR Take 2 Tablets by mouth 2 times a day.    chlorthalidone (HYGROTON) 25 MG Tab Take 1 Tablet by mouth every evening.    valsartan (DIOVAN) 320 MG tablet Take 1 Tablet by mouth every day.    omega-3 acid ethyl esters (LOVAZA) 1 GM capsule Take 2 Capsules by mouth 2 times a day.    SYNTHROID 112 MCG Tab Take 1 Tablet by mouth every morning on an empty stomach.    Semaglutide, 1 MG/DOSE, (OZEMPIC, 1 MG/DOSE,) 4 MG/3ML Solution Pen-injector Inject 1 mg under the skin every 7 days.    atorvastatin (LIPITOR) 40 MG Tab Take 1 Tablet by mouth every evening.    Probiotic Product (PROBIOTIC PO) Take  by mouth every day.    Zinc 22.5 MG Tab Take  by mouth every day.    Ascorbic Acid (VITAMIN C CR) 1500 MG Tab CR every day.    Colostrum 500 MG Cap every day.         Review of Systems   Constitutional:  Negative for malaise/fatigue.   Respiratory:  Positive for shortness of breath.    Cardiovascular:  Negative for chest pain, palpitations, orthopnea, leg swelling and PND.   Neurological:  Negative for dizziness.          Objective:   /68 (BP Location: Left arm, Patient Position: Sitting)   Pulse 80   Resp 16   Ht 1.727 m (5' 8\")   Wt 97.1 kg (214 lb)   SpO2 99%  Body mass index is 32.54 kg/m².       "   Physical Exam  HENT:      Head: Normocephalic and atraumatic.   Cardiovascular:      Rate and Rhythm: Normal rate and regular rhythm.      Heart sounds: No murmur heard.  Pulmonary:      Effort: Pulmonary effort is normal. No respiratory distress.      Breath sounds: Normal breath sounds.   Musculoskeletal:      Right lower leg: No edema.      Left lower leg: No edema.   Skin:     General: Skin is warm and dry.   Neurological:      General: No focal deficit present.      Gait: Gait normal.            Assessment:     1. Family history of coronary artery disease        2. Mixed hyperlipidemia  omega-3 acid ethyl esters (LOVAZA) 1 GM capsule    Lipid Profile    Lipoprotein (a)      3. Hypertensive urgency  chlorthalidone (HYGROTON) 25 MG Tab      4. Essential hypertension  valsartan (DIOVAN) 320 MG tablet      5. Hypokalemia  potassium chloride SA (KDUR) 20 MEQ Tab CR      6. Nonrheumatic mitral valve regurgitation  EC-ECHOCARDIOGRAM COMPLETE W/O CONT      7. LVH (left ventricular hypertrophy)        8. Obesity (BMI 30-39.9)               Medical Decision Making:  Today's Assessment / Plan:   Dyslipidemia/ Family Hx of CAD  -continue Lipitor 40 mg daily.    -Restart Lovaza 2 g twice a day  -Calculated ASCVD risk 10% 3/2024  -Continue to increase activity for a healthy lifestyle and diet modifications  -LDL 86 (2/2025) controlled goal LDL < 100  -Triglyceride 203  -Lipid panel ordered and LP (a) in 3 months    Hypertension, controlled  -Continue valsartan 320 mg  -Continue chlorthalidone 25 mg  -Continue Potassium 40 meq BID.   -Recent CMP reviewed  -Continue to monitor for signs of hypotension and take BP at home    Mitral valve regurgitation  LVH  Shortness of breath  -Repeat echocardiogram ordered for surveillance to monitor for any worsening valvular or structural abnormalities    Obesity with BMI 35.0-35.9/Prediabetes  -Continue to follow with pharmacotherapy for weight loss management and Ozempic  tirtration    It was a pleasure seeing Ms. Nazia Lyle in clinic today. Return for follow up in 6 months or sooner if needed. Patient is aware to call the cardiology clinic with any questions or concerns.

## 2025-03-06 NOTE — PROGRESS NOTES
No chief complaint on file.         Subjective:   Nazia Lyle is a 65 y.o. female who presents today for follow-up.     Previous patient of Dr. Herman.  Current medical problems include hypertension, dyslipidemia, TELLES, obesity. Their last clinic visit was 3/25/2024 with Nisha Roberto.      Cardiovascular Risk Factors:  1. Smoking status: {smoking status:59279}  2. Type II Diabetes Mellitus: {DM:63114}   Lab Results   Component Value Date/Time    HBA1C 5.6 06/07/2023 10:01 AM     3. Hypertension: {Yes/no:28091}  4. Dyslipidemia: {Yes/no:97760}   Cholesterol,Tot   Date Value Ref Range Status   04/09/2024 154  Final     LDL   Date Value Ref Range Status   04/09/2024 94  Final     HDL   Date Value Ref Range Status   04/09/2024 25  Final     Triglycerides   Date Value Ref Range Status   04/09/2024 264  Final     5. Family history of early Coronary Artery Disease in a first degree relative (Male less than 55 years of age; Female less than 65 years of age): {Yes/no:38615}  6.  Obesity and/or Metabolic Syndrome: {Yes/no:69737}  7. Sedentary lifestyle: {Yes/no:72509}    Past Medical History:   Diagnosis Date    Anesthesia     PONV    Arthritis     osteoarthritis, hands    Bowel habit changes     IBD,, occasional diarrhea    Chickenpox     Dyslipidemia 09/17/2012    Fatty liver     Welsh measles     as a child    Heart murmur     High cholesterol     History of kidney stones     HTN (hypertension) 09/17/2012    Hypothyroid     IBD (inflammatory bowel disease)     Obesity 09/17/2012    PONV (postoperative nausea and vomiting)     Pre-diabetes     Sleep apnea     CPAP         Family History   Problem Relation Age of Onset    Thyroid Mother     Diabetes Father     Hypertension Father     Diabetes Maternal Grandmother     Diabetes Maternal Grandfather     Heart Disease Paternal Grandmother     Cancer Paternal Grandfather          Social History     Tobacco Use    Smoking status: Never    Smokeless tobacco: Never    Vaping Use    Vaping status: Never Used   Substance Use Topics    Alcohol use: No    Drug use: No         Allergies   Allergen Reactions    Latex Rash     rash    Tape Rash     .         Current Outpatient Medications   Medication Sig    SYNTHROID 112 MCG Tab Take 1 Tablet by mouth every morning on an empty stomach.    Semaglutide, 1 MG/DOSE, (OZEMPIC, 1 MG/DOSE,) 4 MG/3ML Solution Pen-injector Inject 1 mg under the skin every 7 days.    valsartan (DIOVAN) 320 MG tablet Take 1 Tablet by mouth every day.    chlorthalidone (HYGROTON) 25 MG Tab Take 1 Tablet by mouth every evening.    potassium chloride SA (KDUR) 20 MEQ Tab CR Take 2 Tablets by mouth 2 times a day.    atorvastatin (LIPITOR) 40 MG Tab Take 1 Tablet by mouth every evening.    Probiotic Product (PROBIOTIC PO) Take  by mouth every day.    Zinc 22.5 MG Tab Take  by mouth every day.    omega-3 acid ethyl esters (LOVAZA) 1 GM capsule Take 2 Capsules by mouth 2 times a day.    Ascorbic Acid (VITAMIN C CR) 1500 MG Tab CR every day.    Colostrum 500 MG Cap every day.         ROS        Objective:   There were no vitals taken for this visit. There is no height or weight on file to calculate BMI.         Physical Exam        Lab Results   Component Value Date/Time    SODIUM 140 06/07/2023 10:01 AM    POTASSIUM 4.3 06/07/2023 10:01 AM    CHLORIDE 101 06/07/2023 10:01 AM    CO2 26 06/07/2023 10:01 AM    GLUCOSE 92 06/07/2023 10:01 AM    BUN 15 06/07/2023 10:01 AM    CREATININE 0.55 06/07/2023 10:01 AM      Lab Results   Component Value Date/Time    WBC 6.6 06/07/2023 10:01 AM    RBC 4.96 06/07/2023 10:01 AM    HEMOGLOBIN 14.5 06/07/2023 10:01 AM    HEMATOCRIT 44.2 06/07/2023 10:01 AM    MCV 89.1 06/07/2023 10:01 AM    MCH 29.2 06/07/2023 10:01 AM    MCHC 32.8 06/07/2023 10:01 AM    MPV 10.1 06/07/2023 10:01 AM      Lab Results   Component Value Date/Time    CHOLSTRLTOT 154 04/09/2024 11:56 AM    LDL 94 04/09/2024 11:56 AM    HDL 25 04/09/2024 11:56 AM     "TRIGLYCERIDE 264 04/09/2024 11:56 AM       No results found for: \"TROPONINT\"  No results found for: \"NTPROBNP\"  Assessment:   There are no diagnoses linked to this encounter.      Medical Decision Making:  Today's Assessment / Plan:       No follow-ups on file.  Sooner if problems.    CARISSA Sullivan.   "

## 2025-03-12 DIAGNOSIS — E78.2 MIXED HYPERLIPIDEMIA: ICD-10-CM

## 2025-03-13 ENCOUNTER — APPOINTMENT (OUTPATIENT)
Dept: SLEEP MEDICINE | Facility: MEDICAL CENTER | Age: 65
End: 2025-03-13
Attending: PHYSICIAN ASSISTANT
Payer: MEDICARE

## 2025-03-13 RX ORDER — OMEGA-3-ACID ETHYL ESTERS 1 G/1
2 CAPSULE, LIQUID FILLED ORAL 2 TIMES DAILY
Qty: 360 CAPSULE | Refills: 3 | OUTPATIENT
Start: 2025-03-13

## 2025-03-14 ENCOUNTER — PHARMACY VISIT (OUTPATIENT)
Dept: PHARMACY | Facility: MEDICAL CENTER | Age: 65
End: 2025-03-14
Payer: COMMERCIAL

## 2025-03-27 ENCOUNTER — APPOINTMENT (OUTPATIENT)
Dept: SLEEP MEDICINE | Facility: MEDICAL CENTER | Age: 65
End: 2025-03-27
Attending: PHYSICIAN ASSISTANT
Payer: MEDICARE

## 2025-04-11 DIAGNOSIS — E78.5 DYSLIPIDEMIA: ICD-10-CM

## 2025-04-11 RX ORDER — ATORVASTATIN CALCIUM 40 MG/1
40 TABLET, FILM COATED ORAL EVERY EVENING
Qty: 90 TABLET | Refills: 3 | Status: SHIPPED | OUTPATIENT
Start: 2025-04-11

## 2025-04-11 NOTE — TELEPHONE ENCOUNTER
Is the patient due for a refill? Yes    Was the patient seen the last 15 months? Yes    Date of last office visit: 3/6/2025    Does the patient have an upcoming appointment?  No       Provider to refill:[facility]    Does the patient have prison Plus and need 100-day supply? (This applies to ALL medications) Patient does not have SCP

## 2025-05-07 PROCEDURE — RXMED WILLOW AMBULATORY MEDICATION CHARGE

## 2025-05-16 ENCOUNTER — PHARMACY VISIT (OUTPATIENT)
Dept: PHARMACY | Facility: MEDICAL CENTER | Age: 65
End: 2025-05-16
Payer: COMMERCIAL

## 2025-05-16 ENCOUNTER — OFFICE VISIT (OUTPATIENT)
Dept: SLEEP MEDICINE | Facility: MEDICAL CENTER | Age: 65
End: 2025-05-16
Attending: PHYSICIAN ASSISTANT
Payer: COMMERCIAL

## 2025-05-16 VITALS
BODY MASS INDEX: 31.52 KG/M2 | HEIGHT: 68 IN | HEART RATE: 75 BPM | OXYGEN SATURATION: 94 % | DIASTOLIC BLOOD PRESSURE: 70 MMHG | RESPIRATION RATE: 16 BRPM | SYSTOLIC BLOOD PRESSURE: 124 MMHG | WEIGHT: 208 LBS

## 2025-05-16 DIAGNOSIS — G47.33 OSA (OBSTRUCTIVE SLEEP APNEA): Primary | ICD-10-CM

## 2025-05-16 PROCEDURE — 3074F SYST BP LT 130 MM HG: CPT | Performed by: PHYSICIAN ASSISTANT

## 2025-05-16 PROCEDURE — 99213 OFFICE O/P EST LOW 20 MIN: CPT | Performed by: PHYSICIAN ASSISTANT

## 2025-05-16 PROCEDURE — 99214 OFFICE O/P EST MOD 30 MIN: CPT | Performed by: PHYSICIAN ASSISTANT

## 2025-05-16 PROCEDURE — 3078F DIAST BP <80 MM HG: CPT | Performed by: PHYSICIAN ASSISTANT

## 2025-05-16 ASSESSMENT — ENCOUNTER SYMPTOMS
SHORTNESS OF BREATH: 0
SORE THROAT: 0
SPUTUM PRODUCTION: 0
TREMORS: 0
ORTHOPNEA: 0
HEADACHES: 1
DIZZINESS: 1
CHILLS: 0
HEARTBURN: 0
PALPITATIONS: 1
ROS GI COMMENTS: NO DENTURES, NO MISSING TEETH OR SWALLOWING ISSUES
WEIGHT LOSS: 0
INSOMNIA: 0
WHEEZING: 0
COUGH: 0
FEVER: 0
SINUS PAIN: 0

## 2025-05-16 ASSESSMENT — FIBROSIS 4 INDEX: FIB4 SCORE: 0.88

## 2025-05-16 NOTE — PROGRESS NOTES
"Chief Complaint   Patient presents with    Apnea     Last Office Visit 2/22/24 with Xochitl Gonzales P.A.-C.    PAP/O2/OAT: auto CPAP @5-15 cm H20       HPI:  Nazia Lyle is a 65 y.o. year old female here today for follow-up on obstructive sleep apnea.  Last seen in clinic 2/22/2024 by DEUCE Nolasco.  Previously evaluated by Dr. Edvin Pruitt on 1/24/2023.    Past Medical History: Essential hypertension, mixed hyperlipidemia, obesity, IBS, metabolic syndrome, hypertension, fatty liver, heart murmur, left ventricular hypertrophy.     Vitals:  /70 (BP Location: Left arm, Patient Position: Sitting, BP Cuff Size: Adult)   Pulse 75   Resp 16   Ht 1.727 m (5' 8\")   Wt 94.3 kg (208 lb)   SpO2 94% BMI of 31.63 kg/m²    Recent Imaging: None    Echocardiogram obtained 12/3/2021 demonstrated normal left ventricular chamber size, systolic and diastolic function, LVEF estimated 60%. Normal right ventricular size and systolic function. Mild mitral annular calcification with thickened leaflets, mild mitral regurgitation, mild tricuspid regurgitation with estimated RVSP of 25 mmHg.     Currently using  [employer]ics  auto CPAP @5-15 cm H20 pressure; compliance reviewed for 4/15/2025 through 5/15/2025, days used 25/30, average daily usage 5 hours 21 minutes, 77% of days greater than or equal to 4 hours, no significant mask leak, AHI 3 per hour.  See media for full report.    Device obtained prior to 2021  DME provider [facility] in [place]  Mask interface nasal pillows    Home sleep study obtained 6/17/2021 demonstrated findings consistent with severe obstructive sleep apnea with overall DANILO of 60.6 events per hour increased to 71.7 events per hour in supine position.  Low O2 sat of 63% and sats less than 89% for 424 minutes of flow evaluated time.    Sleep schedule goes to bed 11 PM-12 AM, wakens 7-7 30 a.m. , and gets up during the night bathroom x 1   Symptoms denies day time somnolence and denies " "morning headache      Stop Bang Score reported as 5 on 2/23/2024        Review of Systems   Constitutional:  Negative for chills, fever, malaise/fatigue and weight loss.   HENT:  Negative for congestion, hearing loss, nosebleeds, sinus pain, sore throat and tinnitus.    Eyes:         Presc \"prism\" glasses   Respiratory:  Negative for cough, sputum production, shortness of breath and wheezing.    Cardiovascular:  Positive for palpitations (improved off vit D). Negative for chest pain, orthopnea and leg swelling.   Gastrointestinal:  Negative for heartburn.        No dentures, no missing teeth or swallowing issues    Neurological:  Positive for dizziness (positional) and headaches (sinus). Negative for tremors.   Psychiatric/Behavioral:  The patient does not have insomnia.        Past Medical History[1]    Past Surgical History[2]    Family History   Problem Relation Age of Onset    Thyroid Mother     Diabetes Father     Hypertension Father     Diabetes Maternal Grandmother     Diabetes Maternal Grandfather     Heart Disease Paternal Grandmother     Cancer Paternal Grandfather        Social History     Socioeconomic History    Marital status:      Spouse name: Not on file    Number of children: Not on file    Years of education: Not on file    Highest education level: Not on file   Occupational History    Not on file   Tobacco Use    Smoking status: Never    Smokeless tobacco: Never   Vaping Use    Vaping status: Never Used   Substance and Sexual Activity    Alcohol use: No    Drug use: No    Sexual activity: Not on file   Other Topics Concern    Not on file   Social History Narrative    Not on file     Social Drivers of Health     Financial Resource Strain: Not on file   Food Insecurity: Not on file   Transportation Needs: Not on file   Physical Activity: Not on file   Stress: Not on file   Social Connections: Not on file   Intimate Partner Violence: Not on file   Housing Stability: Not on file       Allergies " as of 05/16/2025 - Reviewed 05/16/2025   Allergen Reaction Noted    Latex Rash 04/20/2017    Tape Rash 05/25/2023          Current medications as of today Current Medications[3]      Physical Exam:   Gen:           Alert and oriented, No apparent distress. Mood and affect appropriate, normal interaction with examiner.   Hearing:     Grossly intact.  Nose:          Normal, no lesions or deformities.  Dentition:    Good dentition.   Oropharynx:   Tongue normal, posterior pharynx without erythema or exudate.  Mallampati Classification: II  Neck:        Supple, trachea midline, no masses.  Respiratory Effort: No intercostal retractions or use of accessory muscles.   Gait and Station: Normal.  Digits and Nails: No clubbing, cyanosis, petechiae, or nodes.   Skin:        No rashes, lesions or ulcers noted.               Ext:           No cyanosis or edema.      Immunizations:  Flu: Annual recommended  PCV 20: 2/25/2020  SARS CoV2 Vaccine: Recommended    Assessment / Plan:  1. MADDI (obstructive sleep apnea)  - DME Mask and Supplies    Patient experiencing some sleep related issues including sleep maintenance.  No morning headaches, no daytime somnolence.  Consider extended release melatonin at 3-5 mg.  Reviewed patient compliance, work on increasing daily usage times with therapeutic goal being minimum of 6-6.5 hours/day.  Patient was reminded to use distilled water only in humidifier chamber with fresh fill daily.  Reviewed equipment cleaning and equipment replacement schedule.  Patient to follow-up in 1 year, sooner if needed.      Follow-up:   Return in about 1 year (around 5/16/2026) for Return with Xochitl Gonzales PA-C.    Please note that this dictation was created using voice recognition software. I have made every reasonable attempt to correct obvious errors, but it is possible there are errors of grammar and possibly content that I did not discover before finalizing the note.         [1]   Past Medical  History:  Diagnosis Date    Anesthesia     PONV    Arthritis     osteoarthritis, hands    Bowel habit changes     IBD,, occasional diarrhea    Chickenpox     Dyslipidemia 09/17/2012    Fatty liver     Upper sorbian measles     as a child    Heart murmur     High cholesterol     History of kidney stones     HTN (hypertension) 09/17/2012    Hypothyroid     IBD (inflammatory bowel disease)     Obesity 09/17/2012    PONV (postoperative nausea and vomiting)     Pre-diabetes     Sleep apnea     CPAP   [2]   Past Surgical History:  Procedure Laterality Date    FUSION AND ARTHROEREISIS, JOINT, FOOT AND ANKLE Left 6/22/2023    Procedure: LEFT ARTHRODESIS TARSOMETATARSAL MIDFOOT FUSION SINGLE JOINT WITH NEUROLYSIS OF DEEP PERONEAL NERVE;  Surgeon: Wm Jose Ro M.D.;  Location: SURGERY [facility];  Service: Orthopedics    HARDWARE REMOVAL UPPER EXTREMITY Left 08/11/2015    Procedure: HARDWARE REMOVAL UPPER EXTREMITY;  Surgeon: Chaka Mckeon M.D.;  Location: SURGERY [facility];  Service:     HAND SURGERY Right 2014    middle finger    APPENDECTOMY      CHOLECYSTECTOMY      PRIMARY C SECTION      TONSILLECTOMY     [3]   Current Outpatient Medications   Medication Sig Dispense Refill    atorvastatin (LIPITOR) 40 MG Tab TAKE ONE TABLET BY MOUTH EVERY EVENING 90 Tablet 3    Biotin 10 MG/ML Liquid Take  by mouth.      potassium chloride SA (KDUR) 20 MEQ Tab CR Take 2 Tablets by mouth 2 times a day. 360 Tablet 2    chlorthalidone (HYGROTON) 25 MG Tab Take 1 Tablet by mouth every evening. 90 Tablet 3    valsartan (DIOVAN) 320 MG tablet Take 1 Tablet by mouth every day. 90 Tablet 3    omega-3 acid ethyl esters (LOVAZA) 1 GM capsule Take 2 Capsules by mouth 2 times a day. 360 Capsule 3    Semaglutide, 2 MG/DOSE, (OZEMPIC, 2 MG/DOSE,) 8 MG/3ML Solution Pen-injector Inject 2 mg under the skin every 7 days. 6 mL 6    SYNTHROID 112 MCG Tab Take 1 Tablet by mouth every morning on an empty stomach. 90 Tablet 3    Probiotic Product  (PROBIOTIC PO) Take  by mouth every day.      Zinc 22.5 MG Tab Take  by mouth every day.      Ascorbic Acid (VITAMIN C CR) 1500 MG Tab CR every day.      Colostrum 500 MG Cap every day.       No current facility-administered medications for this visit.

## 2025-05-16 NOTE — PATIENT INSTRUCTIONS
1-some sleep related issues  2-consider extended release melatonin at 3-5 mg (Remfresh and Pure Encapsulation)   3-reviewed compliance  4-work on increasing daily usage times with therapeutic goal being minimum 6-6.5 hours   5-As a reminder use distilled water only in humidifier chamber.  Fresh fill daily  6-Today we reviewed equipment cleaning  once weekly minimum  mask, tubing and water chamber  use dedicated container  use mild soap and water  SoClean or other ozone  are not recommended  white vinegar and water solution is no longer recommended  hang tubing to dry  mask sanitizing wipes are an option for use   7-Equipment replacement schedule : Nasal pillows 2 times per month, Head gear every 6 months, Tubing every 3 months, Ultra-fine filters 2 times per month, Humidifier chamber every 6 months  8-follow up in one year, sooner if needed

## 2025-05-31 DIAGNOSIS — I16.0 HYPERTENSIVE URGENCY: ICD-10-CM

## 2025-06-03 RX ORDER — CHLORTHALIDONE 25 MG/1
25 TABLET ORAL EVERY EVENING
Qty: 90 TABLET | Refills: 0 | Status: SHIPPED | OUTPATIENT
Start: 2025-06-03

## 2025-06-05 ENCOUNTER — APPOINTMENT (OUTPATIENT)
Dept: CARDIOLOGY | Facility: MEDICAL CENTER | Age: 65
End: 2025-06-05
Payer: COMMERCIAL

## 2025-06-06 ENCOUNTER — APPOINTMENT (OUTPATIENT)
Dept: URBAN - METROPOLITAN AREA CLINIC 20 | Facility: CLINIC | Age: 65
Setting detail: DERMATOLOGY
End: 2025-06-06

## 2025-06-06 DIAGNOSIS — Z41.9 ENCOUNTER FOR PROCEDURE FOR PURPOSES OTHER THAN REMEDYING HEALTH STATE, UNSPECIFIED: ICD-10-CM

## 2025-06-06 PROCEDURE — ? BOTOX

## 2025-06-08 ENCOUNTER — HOSPITAL ENCOUNTER (OUTPATIENT)
Facility: MEDICAL CENTER | Age: 65
End: 2025-06-08
Payer: COMMERCIAL

## 2025-06-08 ENCOUNTER — HOSPITAL ENCOUNTER (OUTPATIENT)
Dept: RADIOLOGY | Facility: MEDICAL CENTER | Age: 65
End: 2025-06-08
Payer: COMMERCIAL

## 2025-06-08 ENCOUNTER — OFFICE VISIT (OUTPATIENT)
Dept: URGENT CARE | Facility: PHYSICIAN GROUP | Age: 65
End: 2025-06-08
Payer: COMMERCIAL

## 2025-06-08 VITALS
DIASTOLIC BLOOD PRESSURE: 70 MMHG | HEIGHT: 68 IN | TEMPERATURE: 98.1 F | RESPIRATION RATE: 20 BRPM | WEIGHT: 207 LBS | BODY MASS INDEX: 31.37 KG/M2 | HEART RATE: 74 BPM | SYSTOLIC BLOOD PRESSURE: 124 MMHG | OXYGEN SATURATION: 94 %

## 2025-06-08 DIAGNOSIS — N30.00 ACUTE CYSTITIS WITHOUT HEMATURIA: ICD-10-CM

## 2025-06-08 DIAGNOSIS — Z87.442 HISTORY OF KIDNEY STONES: ICD-10-CM

## 2025-06-08 DIAGNOSIS — M54.50 BILATERAL LOW BACK PAIN, UNSPECIFIED CHRONICITY, UNSPECIFIED WHETHER SCIATICA PRESENT: Primary | ICD-10-CM

## 2025-06-08 DIAGNOSIS — R39.9 UTI SYMPTOMS: ICD-10-CM

## 2025-06-08 DIAGNOSIS — M54.50 BILATERAL LOW BACK PAIN, UNSPECIFIED CHRONICITY, UNSPECIFIED WHETHER SCIATICA PRESENT: ICD-10-CM

## 2025-06-08 DIAGNOSIS — M54.9 BACK PAIN, UNSPECIFIED BACK LOCATION, UNSPECIFIED BACK PAIN LATERALITY, UNSPECIFIED CHRONICITY: ICD-10-CM

## 2025-06-08 LAB
APPEARANCE UR: CLEAR
BILIRUB UR STRIP-MCNC: NEGATIVE MG/DL
COLOR UR AUTO: NORMAL
GLUCOSE UR STRIP.AUTO-MCNC: 100 MG/DL
KETONES UR STRIP.AUTO-MCNC: NEGATIVE MG/DL
LEUKOCYTE ESTERASE UR QL STRIP.AUTO: NORMAL
NITRITE UR QL STRIP.AUTO: POSITIVE
PH UR STRIP.AUTO: 5 [PH] (ref 5–8)
PROT UR QL STRIP: NEGATIVE MG/DL
RBC UR QL AUTO: NEGATIVE
SP GR UR STRIP.AUTO: 1
UROBILINOGEN UR STRIP-MCNC: 0.2 MG/DL

## 2025-06-08 PROCEDURE — 3078F DIAST BP <80 MM HG: CPT

## 2025-06-08 PROCEDURE — 74176 CT ABD & PELVIS W/O CONTRAST: CPT

## 2025-06-08 PROCEDURE — 81002 URINALYSIS NONAUTO W/O SCOPE: CPT

## 2025-06-08 PROCEDURE — 87086 URINE CULTURE/COLONY COUNT: CPT

## 2025-06-08 PROCEDURE — 3074F SYST BP LT 130 MM HG: CPT

## 2025-06-08 PROCEDURE — 87077 CULTURE AEROBIC IDENTIFY: CPT

## 2025-06-08 PROCEDURE — 99204 OFFICE O/P NEW MOD 45 MIN: CPT

## 2025-06-08 PROCEDURE — 87186 SC STD MICRODIL/AGAR DIL: CPT

## 2025-06-08 RX ORDER — CEFDINIR 300 MG/1
300 CAPSULE ORAL EVERY 12 HOURS
Qty: 10 CAPSULE | Refills: 0 | Status: SHIPPED | OUTPATIENT
Start: 2025-06-08 | End: 2025-06-13

## 2025-06-08 ASSESSMENT — ENCOUNTER SYMPTOMS
FLANK PAIN: 1
VOMITING: 0
FEVER: 0
NAUSEA: 0

## 2025-06-08 NOTE — PROGRESS NOTES
Subjective:     CHIEF COMPLAINT  Chief Complaint   Patient presents with    Other     Possible kidney stone,back pain,x1 month       HPI  Nazia Lyle is a very pleasant 65 y.o. female who presents accompanied by a family member with bilateral low back pain that has been present for approximately 1 month.  She additionally has been experiencing painful urination and increased urinary frequency.  She did a telemedicine appointment on May 4th where she was prescribed Macrobid for a 5-day course.  She completed the course as prescribed and had a slight improvement in her symptoms, but they never fully resolved.  She has been taking Azo for the past 3 to 4 days and does report that it was helpful, but did not fully resolve her symptoms.  She is concerned that she may have a kidney stone and reports that she has had stones in the past and has seen Urology for this issue.  She did see her gynecologist on 5/16/2025 and had a urinalysis performed with normal findings.  She is currently having pain at the end of her urinary stream, increased urinary frequency, and has had intermittent chills.  She has not had any known fevers.  She has not been able to visualize any blood in her urine.      REVIEW OF SYSTEMS  Review of Systems   Constitutional:  Negative for fever.   Gastrointestinal:  Negative for nausea and vomiting.   Genitourinary:  Positive for dysuria, flank pain and frequency. Negative for hematuria.       PAST MEDICAL HISTORY  Patient Active Problem List    Diagnosis Date Noted    Prediabetes 10/04/2021    Hypercholesterolemia 03/31/2021    Metabolic syndrome 08/19/2020    Weight gain 04/20/2020    Inflammatory bowel disease 04/20/2020    LVH (left ventricular hypertrophy) 04/20/2020    Family history of coronary artery disease 03/17/2020    Vitamin D deficiency 11/22/2019    Hypothyroidism 08/08/2014    Elevated fasting blood sugar 08/08/2014    Hot flash, menopausal 08/08/2014    Degenerative arthritis of  "finger 04/28/2014    Essential hypertension 09/17/2012    Mixed hyperlipidemia 09/17/2012       SURGICAL HISTORY   has a past surgical history that includes primary c section; appendectomy; cholecystectomy; tonsillectomy; hardware removal upper extremity (Left, 08/11/2015); hand surgery (Right, 2014); and fusion and arthroereisis, joint, foot and ankle (Left, 6/22/2023).    ALLERGIES  Allergies[1]    CURRENT MEDICATIONS  Home Medications       Reviewed by Dulce Maria Mooney P.A.-C. (Physician Assistant) on 06/08/25 at 1158  Med List Status: <None>     Medication Last Dose Status   Ascorbic Acid (VITAMIN C CR) 1500 MG Tab CR Taking Active   atorvastatin (LIPITOR) 40 MG Tab Taking Active   Biotin 10 MG/ML Liquid Taking Active   chlorthalidone (HYGROTON) 25 MG Tab Taking Active   Colostrum 500 MG Cap Taking Active   omega-3 acid ethyl esters (LOVAZA) 1 GM capsule Taking Active   potassium chloride SA (KDUR) 20 MEQ Tab CR Taking Active   Probiotic Product (PROBIOTIC PO) Taking Active   Semaglutide, 2 MG/DOSE, (OZEMPIC, 2 MG/DOSE,) 8 MG/3ML Solution Pen-injector Taking Active   SYNTHROID 112 MCG Tab Taking Active   valsartan (DIOVAN) 320 MG tablet Taking Active   Zinc 22.5 MG Tab Taking Active                    SOCIAL HISTORY  Social History     Tobacco Use    Smoking status: Never    Smokeless tobacco: Never   Vaping Use    Vaping status: Never Used   Substance and Sexual Activity    Alcohol use: No    Drug use: No    Sexual activity: Not on file       FAMILY HISTORY  Family History   Problem Relation Age of Onset    Thyroid Mother     Diabetes Father     Hypertension Father     Diabetes Maternal Grandmother     Diabetes Maternal Grandfather     Heart Disease Paternal Grandmother     Cancer Paternal Grandfather           Objective:     VITAL SIGNS: /70   Pulse 74   Temp 36.7 °C (98.1 °F) (Temporal)   Resp 20   Ht 1.727 m (5' 8\")   Wt 93.9 kg (207 lb)   SpO2 94%   BMI 31.47 kg/m²     PHYSICAL " EXAM  Physical Exam  Vitals reviewed.   Constitutional:       General: She is not in acute distress.     Appearance: Normal appearance. She is not ill-appearing or toxic-appearing.   HENT:      Head: Normocephalic and atraumatic.      Nose: Nose normal.      Mouth/Throat:      Mouth: Mucous membranes are moist.   Eyes:      Conjunctiva/sclera: Conjunctivae normal.   Cardiovascular:      Rate and Rhythm: Normal rate.   Pulmonary:      Effort: Pulmonary effort is normal.   Abdominal:      Tenderness: There is no right CVA tenderness or left CVA tenderness.   Musculoskeletal:        Back:       Comments: Pain localized to lumbar region. No tenderness to palpation.    Skin:     General: Skin is warm and dry.      Coloration: Skin is not pale.   Neurological:      General: No focal deficit present.      Mental Status: She is alert.   Psychiatric:         Mood and Affect: Mood normal.         Assessment/Plan:     1. Bilateral low back pain, unspecified chronicity, unspecified whether sciatica present  - POCT Urinalysis  - URINE CULTURE(NEW); Future  - CT-RENAL COLIC EVALUATION(A/P W/O); Future    2. History of kidney stones  - CT-RENAL COLIC EVALUATION(A/P W/O); Future    3. Acute cystitis without hematuria  - URINE CULTURE(NEW); Future  - CT-RENAL COLIC EVALUATION(A/P W/O); Future  - cefdinir (OMNICEF) 300 MG Cap; Take 1 Capsule by mouth every 12 hours for 5 days.  Dispense: 10 Capsule; Refill: 0  - Be seen in ED if any worsening of symptoms  - Increase hydration  - Avoid excess caffeine and avoid alcohol until symptoms completely resolved  - Follow-up with PCP  - Return to clinic as needed    Results:  CT-RENAL COLIC EVALUATION(A/P W/O)  Narrative: 6/8/2025 2:56 PM    HISTORY/REASON FOR EXAM:  Hx of kidney stones. UA obtained inaccurate due to Azo. Back pain. UTI symptoms.    TECHNIQUE/EXAM DESCRIPTION AND NUMBER OF VIEWS: CT scan renal/colic without contrast.    5 mm helical images of the abdomen and pelvis were  obtained from the diaphragmatic domes through the pubic symphysis.    Low dose optimization technique was utilized for this CT exam including automated exposure control and adjustment of the mA and/or kV according to patient size.    Comparison:  06/04/2012    FINDINGS:    Abdomen:  There is no evidence of renal or ureteral stone or evidence of hydronephrosis.  There is no evidence of focal consolidation or pleural effusion within the lung bases.  No large masses are seen within the upper abdomen. There are ventral abdominal wall hernias which contains abdominal fat and no bowel loops. Post cholecystectomy.    Pelvis:  No urinary tract calcifications or evidence of obstruction are identified in the pelvis.  There is no evidence of inflammatory change seen in the region of the bowel.  Impression: 1.  No evidence of urinary tract calculus or hydronephrosis. No evidence of peritoneal inflammation.    2.  Post cholecystectomy.    3.  Ventral abdominal wall hernias containing abdominal fat and no bowel loops.        MDM/Comments:  Patient has stable vital signs and is non-toxic appearing.  Urinalysis obtained positive for nitrates and leukocytes, negative for hematuria.  Unfortunately, the urinalysis is not reliable secondary to having taken Azo.  Given history of kidney stones with back pain, a CT renal colic has been ordered without evidence of nephrolithiasis, pyelonephritis, or hydronephrosis.  Contrast dye has not been used for this study due to inability to obtain labs in a timely manner.  Patient has been called and informed of results.  Discussed with patient that low back pain does not seem to be secondary to a renal issue.  Patient's urine has been sent for culture and she has been initiated on a 5-day course of cefdinir for suspected acute cystitis.  Discussed supportive care with hydration, rest, Tylenol as needed.  Discussed if any worsening of symptoms to be seen in the ED.  Patient demonstrated  understanding of treatment plan at this time and will RTC/see her PCP if symptoms fail to resolve.     Differential diagnosis, natural history, supportive care, and indications for immediate follow-up discussed. All questions answered. Patient agrees with the plan of care.    Follow-up as needed if symptoms worsen or fail to improve to PCP, Urgent care or Emergency Room.    I have personally reviewed prior external notes and test results pertinent to today's visit.  I have independently reviewed and interpreted all diagnostics ordered during this urgent care acute visit.   Discussed management options (risks,benefits, and alternatives to treatment). Pt expresses understanding and the treatment plan was agreed upon. Questions were encouraged and answered to pt's satisfaction.    Please note that this dictation was created using voice recognition software. I have made a reasonable attempt to correct obvious errors, but I expect that there are errors of grammar and possibly content that I did not discover before finalizing the note.       [1]   Allergies  Allergen Reactions    Latex Rash     rash    Tape Rash     .

## 2025-06-10 ENCOUNTER — RESULTS FOLLOW-UP (OUTPATIENT)
Dept: URGENT CARE | Facility: PHYSICIAN GROUP | Age: 65
End: 2025-06-10

## 2025-06-10 LAB
BACTERIA UR CULT: ABNORMAL
BACTERIA UR CULT: ABNORMAL
SIGNIFICANT IND 70042: ABNORMAL
SITE SITE: ABNORMAL
SOURCE SOURCE: ABNORMAL

## 2025-06-16 ENCOUNTER — HOSPITAL ENCOUNTER (OUTPATIENT)
Dept: CARDIOLOGY | Facility: MEDICAL CENTER | Age: 65
End: 2025-06-16
Payer: COMMERCIAL

## 2025-06-16 ENCOUNTER — NON-PROVIDER VISIT (OUTPATIENT)
Dept: CARDIOLOGY | Facility: MEDICAL CENTER | Age: 65
End: 2025-06-16
Payer: COMMERCIAL

## 2025-06-16 VITALS — HEIGHT: 68 IN | BODY MASS INDEX: 30.95 KG/M2 | WEIGHT: 204.2 LBS

## 2025-06-16 DIAGNOSIS — I34.0 NONRHEUMATIC MITRAL VALVE REGURGITATION: ICD-10-CM

## 2025-06-16 LAB
LV EJECT FRACT  99904: 60
LV EJECT FRACT MOD 2C 99903: 63.58
LV EJECT FRACT MOD 4C 99902: 59.86

## 2025-06-16 PROCEDURE — 99212 OFFICE O/P EST SF 10 MIN: CPT

## 2025-06-16 PROCEDURE — 93306 TTE W/DOPPLER COMPLETE: CPT

## 2025-06-16 PROCEDURE — 93306 TTE W/DOPPLER COMPLETE: CPT | Mod: 26 | Performed by: INTERNAL MEDICINE

## 2025-06-16 NOTE — PROGRESS NOTES
Patient Consult Note    Primary care physician: Pcp Pt States None    Date of Referral: 3/5/25 (Renewed today)    Reason for consult: Obesity/Weight Management    HPI:  Nazia Lyle is a 64 y.o. old patient who comes in today for evaluation of above stated problem.    Initial Weight: 279 lbs  Initial BMI: 42.42 kg/m2    Current Weight: 204 lbs   Current BMI: 31.05 kg/m2      Most Recent HbA1c:   Lab Results   Component Value Date/Time    HBA1C 5.6 06/07/2023 10:01 AM        Most Recent TSH:       Most Recent SrCr and GFR:      Current Obesity Medication Regimen  GIP and/or GLP-1 Analog: semaglutide (Ozempic) 2 mg subQ once weekly  For use as an adjunct to diet and exercise in patients with a BMI >=30 kg/m2, or in patients with a BMI >=27 kg/m2 and >=1 weight-associated comorbidity (eg, HTN, HLD, MADDI, T2DM, etc).  Pt denies personal/family hx of medullary thyroid carcinoma or multiple endocrine neoplasia syndrome type 2 (MEN 2).    Previous Obesity Medication(s) and Reason for Discontinuation  Phentermine and metformin - Pt states that metformin was Dc'd d/t being lost to f/u w/ other wt management clinic. Pt w/ hx of IBD - can only tolerate metformin 500 mg 1/2 tab BID.     Lifestyle  Physical Activity:  Current Exercise - Does pilates (historically twice per week for 15 to 20 min per workout) less lately. Plans to walk more as the weather warms up.  Exercise Goal - At least 150 min/week of anything aerobic.    Dietary: 2 meals w/ snacks. Measures portion sizes stringently. Monitors caloric intake (~ 500 or less calories/day - does 1/2 c pyrex's for meals). Tries to moderate sugars.  Breakfast/lunch - Protein shake   Dinner - Lean protein, vegetables  Snack - Fruit, protein bar (1/4)  Patient is interest in adding nuts to diet for more protein benefit.  Dessert - Sugar free popsicles, raw honey, PB  Beverage - Water mostly, 1 cup of coffee (w/ half and half), tea (w/ Stevia and/or half and half)      Past  Medical History:  Patient Active Problem List    Diagnosis Date Noted    Prediabetes 10/04/2021    Hypercholesterolemia 03/31/2021    Metabolic syndrome 08/19/2020    Weight gain 04/20/2020    Inflammatory bowel disease 04/20/2020    LVH (left ventricular hypertrophy) 04/20/2020    Family history of coronary artery disease 03/17/2020    Vitamin D deficiency 11/22/2019    Hypothyroidism 08/08/2014    Elevated fasting blood sugar 08/08/2014    Hot flash, menopausal 08/08/2014    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    Essential hypertension 09/17/2012    Mixed hyperlipidemia 09/17/2012       Past Surgical History:  Past Surgical History:   Procedure Laterality Date    FUSION AND ARTHROEREISIS, JOINT, FOOT AND ANKLE Left 6/22/2023    Procedure: LEFT ARTHRODESIS TARSOMETATARSAL MIDFOOT FUSION SINGLE JOINT WITH NEUROLYSIS OF DEEP PERONEAL NERVE;  Surgeon: Wm Jose Ro M.D.;  Location: SURGERY [facility];  Service: Orthopedics    HARDWARE REMOVAL UPPER EXTREMITY Left 08/11/2015    Procedure: HARDWARE REMOVAL UPPER EXTREMITY;  Surgeon: Chaka Mckeon M.D.;  Location: SURGERY [facility];  Service:     HAND SURGERY Right 2014    middle finger    APPENDECTOMY      CHOLECYSTECTOMY      PRIMARY C SECTION      TONSILLECTOMY         Allergies:  Latex and Tape    Social History:  Social History     Socioeconomic History    Marital status:      Spouse name: Not on file    Number of children: Not on file    Years of education: Not on file    Highest education level: Not on file   Occupational History    Not on file   Tobacco Use    Smoking status: Never    Smokeless tobacco: Never   Vaping Use    Vaping status: Never Used   Substance and Sexual Activity    Alcohol use: No    Drug use: No    Sexual activity: Not on file   Other Topics Concern    Not on file   Social History Narrative    Not on file     Social Drivers of Health     Financial Resource Strain: Not on file   Food Insecurity: Not on file    Transportation Needs: Not on file   Physical Activity: Not on file   Stress: Not on file   Social Connections: Not on file   Intimate Partner Violence: Not on file   Housing Stability: Not on file       Family History:  Family History   Problem Relation Age of Onset    Thyroid Mother     Diabetes Father     Hypertension Father     Diabetes Maternal Grandmother     Diabetes Maternal Grandfather     Heart Disease Paternal Grandmother     Cancer Paternal Grandfather        Medications:    Current Outpatient Medications:     chlorthalidone (HYGROTON) 25 MG Tab, Take 1 Tablet by mouth every evening., Disp: 90 Tablet, Rfl: 0    atorvastatin (LIPITOR) 40 MG Tab, TAKE ONE TABLET BY MOUTH EVERY EVENING, Disp: 90 Tablet, Rfl: 3    Biotin 10 MG/ML Liquid, Take  by mouth., Disp: , Rfl:     potassium chloride SA (KDUR) 20 MEQ Tab CR, Take 2 Tablets by mouth 2 times a day., Disp: 360 Tablet, Rfl: 2    valsartan (DIOVAN) 320 MG tablet, Take 1 Tablet by mouth every day., Disp: 90 Tablet, Rfl: 3    omega-3 acid ethyl esters (LOVAZA) 1 GM capsule, Take 2 Capsules by mouth 2 times a day., Disp: 360 Capsule, Rfl: 3    Semaglutide, 2 MG/DOSE, (OZEMPIC, 2 MG/DOSE,) 8 MG/3ML Solution Pen-injector, Inject 2 mg under the skin every 7 days., Disp: 6 mL, Rfl: 6    SYNTHROID 112 MCG Tab, Take 1 Tablet by mouth every morning on an empty stomach., Disp: 90 Tablet, Rfl: 3    Probiotic Product (PROBIOTIC PO), Take  by mouth every day., Disp: , Rfl:     Zinc 22.5 MG Tab, Take  by mouth every day., Disp: , Rfl:     Ascorbic Acid (VITAMIN C CR) 1500 MG Tab CR, every day., Disp: , Rfl:     Colostrum 500 MG Cap, every day., Disp: , Rfl:     Physical Examination:  Vital signs: There were no vitals taken for this visit.     Assessment and Plan:    1. Obesity/Weight Management  Pt presents to clinic stating that she's been doing well since last visit. No issues w/ her Ozempic Rx in regard to SE, missed doses, or affordability.  Pt notes decreased  appetite since last visit. Was able to start monitoring caloric intake - eating ~ 500 calories daily.  Since establishing w/ pt she's lost ~ 75 lbs. Since last visit she's lost ~ 10 lbs. Her goal weight is ~ 180 lbs.  Patient would like to continue Ozempic for further weight loss benefits.Patient is currently on max dose of Ozempic   Pt very stringent w/ her diet and portion size moderation. She will work to increase physical activity further - emphasized importance of this today in clinic as we are at the max dose of Ozempic.   May consider Wegovy 2.4 mg in the future if needed. However patient is utilizing [employer] pricing for Ozempic    - Medication changes:  None     - Continue:  Semaglutide (Ozempic) to 2 mg subQ once weekly    - Lifestyle changes:  Diet: Monitor caloric intake - aim for deficit. Maximize lean proteins and veggies. Cut out/down on carbs. Avoid simple sugars.   Referral to Nutrition Services placed: No   Exercise: Increase as tolerated. Goal of at least 150 min/week of anything aerobic.    Follow Up:  3 months    Emilia Maravilla, Pharmacy Intern  Bony Brown, PharmD

## 2025-06-17 ENCOUNTER — RESULTS FOLLOW-UP (OUTPATIENT)
Dept: CARDIOLOGY | Facility: MEDICAL CENTER | Age: 65
End: 2025-06-17
Payer: MEDICARE

## 2025-06-23 DIAGNOSIS — E87.6 HYPOKALEMIA: ICD-10-CM

## 2025-06-24 PROCEDURE — RXMED WILLOW AMBULATORY MEDICATION CHARGE

## 2025-06-25 RX ORDER — POTASSIUM CHLORIDE 1500 MG/1
40 TABLET, EXTENDED RELEASE ORAL 2 TIMES DAILY
Qty: 360 TABLET | Refills: 2 | OUTPATIENT
Start: 2025-06-25

## 2025-06-26 ENCOUNTER — TELEPHONE (OUTPATIENT)
Dept: SLEEP MEDICINE | Facility: MEDICAL CENTER | Age: 65
End: 2025-06-26
Payer: MEDICARE

## 2025-07-07 ENCOUNTER — HOSPITAL ENCOUNTER (OUTPATIENT)
Facility: MEDICAL CENTER | Age: 65
End: 2025-07-07
Attending: OBSTETRICS & GYNECOLOGY
Payer: MEDICARE

## 2025-07-07 ENCOUNTER — PHARMACY VISIT (OUTPATIENT)
Dept: PHARMACY | Facility: MEDICAL CENTER | Age: 65
End: 2025-07-07
Payer: COMMERCIAL

## 2025-07-07 DIAGNOSIS — Z12.31 ENCOUNTER FOR MAMMOGRAM TO ESTABLISH BASELINE MAMMOGRAM: ICD-10-CM

## 2025-07-07 PROCEDURE — 77063 BREAST TOMOSYNTHESIS BI: CPT

## 2025-07-21 DIAGNOSIS — E78.5 DYSLIPIDEMIA: ICD-10-CM

## 2025-07-21 RX ORDER — ATORVASTATIN CALCIUM 40 MG/1
40 TABLET, FILM COATED ORAL EVERY EVENING
Qty: 90 TABLET | Refills: 2 | Status: SHIPPED | OUTPATIENT
Start: 2025-07-21

## 2025-07-21 NOTE — TELEPHONE ENCOUNTER
Is the patient due for a refill? Yes, requested for different pharmacy    Was the patient seen the last 15 months? Yes    Date of last office visit: 3/6/2025    Does the patient have an upcoming appointment?  No    Provider to refill:[facility]    Does the patient have [facility] and need 100-day supply? (This applies to ALL medications) Patient does not have [facility]

## 2025-07-21 NOTE — TELEPHONE ENCOUNTER
[facility]    Received request via: Patient    Was the patient seen in the last year in this department? Yes    Does the patient have an active prescription (recently filled or refills available) for medication(s) requested? Yes.   *Changing Pharmacies*    Pharmacy Name:   [facility] [address] [place] [address]  [address] [place] [phone]       Does the patient have [facility] and need 100-day supply? (This applies to ALL medications) Patient does not have SCP     atorvastatin (LIPITOR) 40 MG Tab     Thank you,  Blanca PÉREZ

## 2025-07-23 ENCOUNTER — TELEPHONE (OUTPATIENT)
Dept: SLEEP MEDICINE | Facility: MEDICAL CENTER | Age: 65
End: 2025-07-23
Payer: COMMERCIAL

## 2025-07-24 ENCOUNTER — APPOINTMENT (OUTPATIENT)
Dept: URBAN - METROPOLITAN AREA CLINIC 20 | Facility: CLINIC | Age: 65
Setting detail: DERMATOLOGY
End: 2025-07-24

## 2025-07-24 DIAGNOSIS — Z41.9 ENCOUNTER FOR PROCEDURE FOR PURPOSES OTHER THAN REMEDYING HEALTH STATE, UNSPECIFIED: ICD-10-CM

## 2025-07-24 PROCEDURE — ? FILLERS

## 2025-08-20 ENCOUNTER — TELEPHONE (OUTPATIENT)
Dept: ENDOCRINOLOGY | Facility: MEDICAL CENTER | Age: 65
End: 2025-08-20
Payer: COMMERCIAL

## 2025-08-25 ENCOUNTER — APPOINTMENT (OUTPATIENT)
Dept: ENDOCRINOLOGY | Facility: MEDICAL CENTER | Age: 65
End: 2025-08-25
Payer: MEDICARE

## 2025-09-11 ENCOUNTER — APPOINTMENT (OUTPATIENT)
Dept: RADIOLOGY | Facility: MEDICAL CENTER | Age: 65
End: 2025-09-11
Attending: OBSTETRICS & GYNECOLOGY
Payer: COMMERCIAL

## (undated) DEVICE — GLOVE BIOGEL SZ 8 SURGICAL PF LTX - (50PR/BX 4BX/CA)

## (undated) DEVICE — CHLORAPREP 26 ML APPLICATOR - ORANGE TINT(25/CA)

## (undated) DEVICE — STAPLER 35MM SKIN WIDE (6EA/BX)

## (undated) DEVICE — LACTATED RINGERS INJ 1000 ML - (14EA/CA 60CA/PF)

## (undated) DEVICE — PACK LOWER EXTREMITY - (2/CA)

## (undated) DEVICE — COVER LIGHT HANDLE FLEXIBLE - SOFT (2EA/PK 80PK/CA)

## (undated) DEVICE — BANDAGE ELASTIC 6 IN X 5 YDS - LATEX FREE (10/BX)

## (undated) DEVICE — ELECTRODE DUAL RETURN W/ CORD - (50/PK)

## (undated) DEVICE — DRAPE LARGE 3 QUARTER - (20/CA)

## (undated) DEVICE — BANDAGEESMARK  4X9' BLUE LF - 20/CS"

## (undated) DEVICE — TOWEL STOP TIMEOUT SAFETY FLAG (40EA/CA)

## (undated) DEVICE — PADDING CAST 6 IN STERILE - 6 X 4 YDS (24/CA)

## (undated) DEVICE — SUTURE 2-0 VICRYL PLUS SH - 8 X 18 INCH (12/BX)

## (undated) DEVICE — SUTURE 4-0 MONOCRYL PLUS PS-2 - 27 INCH (36/BX)

## (undated) DEVICE — STOCKINET BIAS 4 IN STERILE - (20/CA)

## (undated) DEVICE — SPONGE GAUZESTER 4 X 4 4PLY - (128PK/CA)

## (undated) DEVICE — SODIUM CHL IRRIGATION 0.9% 1000ML (12EA/CA)

## (undated) DEVICE — SUTURE 3-0 VICRYL PLUS SH - 8X 18 INCH (12/BX)

## (undated) DEVICE — SUTURE 3-0 ETHILON FS-1 - (36/BX) 30 INCH

## (undated) DEVICE — PAD PREP 24 X 48 CUFFED - (100/CA)

## (undated) DEVICE — Device

## (undated) DEVICE — BANDAGE ELASTIC 2 X 5 YDS - STERILE VELCRO (25/CA) LATEX

## (undated) DEVICE — BLADE SURGICAL #10 - (50/BX)